# Patient Record
Sex: FEMALE | Race: WHITE | Employment: FULL TIME | URBAN - METROPOLITAN AREA
[De-identification: names, ages, dates, MRNs, and addresses within clinical notes are randomized per-mention and may not be internally consistent; named-entity substitution may affect disease eponyms.]

---

## 2017-01-31 ENCOUNTER — TELEPHONE (OUTPATIENT)
Dept: INTERNAL MEDICINE | Facility: CLINIC | Age: 62
End: 2017-01-31

## 2017-01-31 NOTE — TELEPHONE ENCOUNTER
"FYI only:  Patient calling to report a severe case of vertigo [facility] about [facility].m[facility]  States she was tilted back having some grinding done on her teeth when the room started spinning and it looked like the windows were swimming/moving. Staff sat her up, gave her some juice and candy as she hadn't eaten anything.  Symptoms lasted 1-2 minutes and were accompanied by a \"tension headache\" around her eyes, better now.  This afternoon she still feels a little off balance, slight HA.  Denies recent illness/URI, fever or experiencing any numbness, tingling, weakness, nausea, vomiting.  BP at [facility] office was 128/75.  Patient would like to see Dr. Hawthorne, no openings this afternoon, offered appt tomorrow with Dr. Hawthorne (spouse to drive her) with the understanding that she will be seen in ER if she develops new or worsening symptoms.  Patient is in agreement with this plan. DEO Arndt R.N.    "

## 2017-02-01 ENCOUNTER — OFFICE VISIT (OUTPATIENT)
Dept: INTERNAL MEDICINE | Facility: CLINIC | Age: 62
End: 2017-02-01
Payer: COMMERCIAL

## 2017-02-01 VITALS
HEART RATE: 86 BPM | WEIGHT: 160.6 LBS | DIASTOLIC BLOOD PRESSURE: 60 MMHG | HEIGHT: 69 IN | OXYGEN SATURATION: 98 % | BODY MASS INDEX: 23.79 KG/M2 | SYSTOLIC BLOOD PRESSURE: 100 MMHG | TEMPERATURE: 98.5 F

## 2017-02-01 DIAGNOSIS — R42 VERTIGO: Primary | ICD-10-CM

## 2017-02-01 PROCEDURE — 99213 OFFICE O/P EST LOW 20 MIN: CPT | Performed by: INTERNAL MEDICINE

## 2017-02-01 NOTE — PROGRESS NOTES
"  SUBJECTIVE:                                                      HPI: Melva Saleh is a pleasant 62 year old female who presents with vertigo:    - developed vertigo yesterday while getting dental work performed  - lasted 1-2 minutes   - felt a bit unstable, shaky, and uneasy afterwards    - this improved throughout the day    - was washing hair this morning when she redeveloped vertigo  - lasted <1 minute  - felt a bit uneasy afterwards - slowly improving    - in retrospect she has been having very mild episodes of vertigo all along when she is...   - turning in bed   - looking up into her cabinets   - being laid down in the dentist's chair    - no associated nausea or vomiting  - no associated headache or vision changes  - no hearing loss or tinnitus  - no focal numbness, tingling, or weakness  - no slurred speech or facial droop    - no recent URIs or active allergies  - no fevers or chills    The medication, allergy, and problem lists have been reviewed and updated as appropriate.     OBJECTIVE:                                                      /60 mmHg  Pulse 86  Temp(Src) 98.5  F (36.9  C) (Oral)  Ht 5' 9\" (1.753 m)  Wt 160 lb 9.6 oz (72.848 kg)  BMI 23.71 kg/m2  SpO2 98%  LMP  (LMP Unknown)  Constitutional: well-appearing  Respiratory: normal respiratory effort; clear to auscultation bilaterally  Cardiovascular: regular rate and rhythm; no edema  Musculoskeletal: normal gait, station, and transfers  Neuro: cranial nerves intact; speech fluent; moving all extremities normallys  Psych: normal judgment and insight; normal mood and affect; recent and remote memory intact    Angela-Hallpike maneuver: mildly symptomatic on right side, but no nystagmus; normal on left    ASSESSMENT/PLAN:                                                      (R42) Vertigo  (primary encounter diagnosis)  Comment: consistent with BPPV - discussed cause of this with patient via diagrams and pictures.   Plan:    - discussed " repositioning maneuver with patient - she will try with .   - referral to PT balance/vestibular deferred for now.    - if symptoms worsen, change, or do not improve, patient to contact MD.    The instructions on the AVS were discussed and explained to the patient. Patient expressed understanding of instructions.    Makayla Hawthorne MD   [address] [address]  T: [phone], F: [phone]

## 2017-02-01 NOTE — PATIENT INSTRUCTIONS
Suspect BPPV.    Recommend Epley maneuver with  - watch some youtube videos if it helps.    ---    If more comfortable with a therapist, we can always place a referral.

## 2017-02-01 NOTE — MR AVS SNAPSHOT
After Visit Summary   2/1/2017    Melva Saleh    MRN: [id number]           Patient Information     Date Of Birth          1955        Visit Information        Provider Department      2/1/2017 1:30 PM Makayla Hawthorne MD [facility]        Care Instructions    Suspect BPPV.    Recommend Epley maneuver with  - watch some youtube videos if it helps.    ---    If more comfortable with a therapist, we can always place a referral.         Follow-ups after your visit        Who to contact     If you have questions or need follow up information about today's clinic visit or your schedule please contact [facility] directly at [phone].  Normal or non-critical lab and imaging results will be communicated to you by MyChart, letter or phone within 4 business days after the clinic has received the results. If you do not hear from us within 7 days, please contact the clinic through Kalidohart or phone. If you have a critical or abnormal lab result, we will notify you by phone as soon as possible.  Submit refill requests through InvitedHome or call your pharmacy and they will forward the refill request to us. Please allow 3 business days for your refill to be completed.          Additional Information About Your Visit        MyChart Information     InvitedHome gives you secure access to your electronic health record. If you see a primary care provider, you can also send messages to your care team and make appointments. If you have questions, please call your primary care clinic.  If you do not have a primary care provider, please call [phone] and they will assist you.        Care EveryWhere ID     This is your Care EveryWhere ID. This could be used by other organizations to access your [place] medical records  [id number]        Your Vitals Were     Pulse Temperature Height BMI (Body Mass Index) Pulse Oximetry Last Period    86 98.5  F (36.9  C) (Oral)  "5' 9\" (1.753 m) 23.71 kg/m2 98% (LMP Unknown)       Blood Pressure from Last 3 Encounters:   02/01/17 100/60   10/27/16 110/66   04/08/16 120/70    Weight from Last 3 Encounters:   02/01/17 160 lb 9.6 oz (72.848 kg)   10/27/16 161 lb 14.4 oz (73.437 kg)   04/08/16 161 lb (73.029 kg)              Today, you had the following     No orders found for display       Primary Care Provider Office Phone # Fax #    Luis Angel Espinoza [phone]       [facility] [facility] [address]  [facility] [address]        Thank you!     Thank you for choosing [facility]  for your care. Our goal is always to provide you with excellent care. Hearing back from our patients is one way we can continue to improve our services. Please take a few minutes to complete the written survey that you may receive in the mail after your visit with us. Thank you!             Your Updated Medication List - Protect others around you: Learn how to safely use, store and throw away your medicines at www.disposemymeds.org.          This list is accurate as of: 2/1/17  2:22 PM.  Always use your most recent med list.                   Brand Name Dispense Instructions for use    pravastatin 20 MG tablet    PRAVACHOL    90 tablet    Take 1 tablet (20 mg) by mouth daily         "

## 2017-02-01 NOTE — NURSING NOTE
"Chief Complaint   Patient presents with     Dizziness     x 2 days.       Initial /60 mmHg  Pulse 86  Temp(Src) 98.5  F (36.9  C) (Oral)  Ht 5' 9\" (1.753 m)  Wt 160 lb 9.6 oz (72.848 kg)  BMI 23.71 kg/m2  SpO2 98%  LMP  (LMP Unknown) Estimated body mass index is 23.71 kg/(m^2) as calculated from the following:    Height as of this encounter: 5' 9\" (1.753 m).    Weight as of this encounter: 160 lb 9.6 oz (72.848 kg).  BP completed using cuff size: regular    Kaminibose MA      "

## 2017-04-04 ENCOUNTER — HOSPITAL ENCOUNTER (OUTPATIENT)
Dept: BONE DENSITY | Facility: CLINIC | Age: 62
End: 2017-04-04
Attending: INTERNAL MEDICINE
Payer: COMMERCIAL

## 2017-04-04 ENCOUNTER — HOSPITAL ENCOUNTER (OUTPATIENT)
Dept: MAMMOGRAPHY | Facility: CLINIC | Age: 62
Discharge: HOME | End: 2017-04-04
Attending: INTERNAL MEDICINE | Admitting: INTERNAL MEDICINE
Payer: COMMERCIAL

## 2017-04-04 DIAGNOSIS — Z12.31 VISIT FOR SCREENING MAMMOGRAM: ICD-10-CM

## 2017-04-04 DIAGNOSIS — Z78.0 ASYMPTOMATIC MENOPAUSE: ICD-10-CM

## 2017-04-04 PROCEDURE — G0202 SCR MAMMO BI INCL CAD: HCPCS

## 2017-04-04 PROCEDURE — 77080 DXA BONE DENSITY AXIAL: CPT

## 2017-09-28 ENCOUNTER — TELEPHONE (OUTPATIENT)
Dept: FAMILY MEDICINE | Facility: CLINIC | Age: 62
End: 2017-09-28

## 2017-09-28 NOTE — TELEPHONE ENCOUNTER
Reason for call:  Patient reporting a symptom    Symptom or request: body aches, cough, feeling unwell    Duration (how long have symptoms been present): 3 days    Have you been treated for this before? No    Additional comments:  called.  Declined to make an appt at this time.      Phone Number patient can be reached at:  Other phone number:  [phone]    Best Time:  any    Can we leave a detailed message on this number:  YES    Call taken on 9/28/2017 at 11:10 AM by MURALI CAMPO

## 2017-10-02 NOTE — TELEPHONE ENCOUNTER
"Pt is calling with questions about her symptoms. She says that her \"cold\" seems to be getting better. But she continues to have an achy feeling in her back. She says her back feels \"tender, tight, and almost spasm-like.\" But she is just getting over flu-like symptoms. Muscle aches and pains. Advised that she use a heating pad for her back when she sits. Light exercises or stretches. Drink lots of water. Can take IB, or tyl for discomfort. When she starts to feel better, if her back muscles still feel tight or tense, going for a massage may help. Or chiropractic work. Otherwise if symptoms worsen, schedule appt in clinic.  "

## 2017-10-06 ENCOUNTER — OFFICE VISIT (OUTPATIENT)
Dept: INTERNAL MEDICINE | Facility: CLINIC | Age: 62
End: 2017-10-06
Payer: COMMERCIAL

## 2017-10-06 ENCOUNTER — TELEPHONE (OUTPATIENT)
Dept: INTERNAL MEDICINE | Facility: CLINIC | Age: 62
End: 2017-10-06

## 2017-10-06 VITALS
BODY MASS INDEX: 24.72 KG/M2 | WEIGHT: 166.9 LBS | HEIGHT: 69 IN | OXYGEN SATURATION: 98 % | TEMPERATURE: 98.2 F | DIASTOLIC BLOOD PRESSURE: 80 MMHG | HEART RATE: 72 BPM | SYSTOLIC BLOOD PRESSURE: 112 MMHG

## 2017-10-06 DIAGNOSIS — L24.9 IRRITANT CONTACT DERMATITIS, UNSPECIFIED TRIGGER: Primary | ICD-10-CM

## 2017-10-06 PROCEDURE — 99213 OFFICE O/P EST LOW 20 MIN: CPT | Performed by: PHYSICIAN ASSISTANT

## 2017-10-06 RX ORDER — TRIAMCINOLONE ACETONIDE 1 MG/G
CREAM TOPICAL
Qty: 15 G | Refills: 1 | Status: SHIPPED | OUTPATIENT
Start: 2017-10-06 | End: 2018-10-08

## 2017-10-06 NOTE — PROGRESS NOTES
"  SUBJECTIVE:   Melva Saleh is a 62 year old female who presents to clinic today for the following health issues:      Rash  Onset: x1 wk     Description:   Location: on face   Character: round, burning, red  Itching (Pruritis): YES    Progression of Symptoms:  worsening    Accompanying Signs & Symptoms:  Fever: no   Body aches or joint pain: YES  Sore throat symptoms: no   Recent cold symptoms: YES    History:   Previous similar rash: no   Notes skin is getting drier with age.    Precipitating factors:   Exposure to similar rash: no   New exposures: None   Recent travel: no     Alleviating factors:  Na     Therapies Tried and outcome:vanocream cleanser, hydrocortisone cream -a little relief       -------------------------------------    Problem list and histories reviewed & adjusted, as indicated.  Additional history: as documented    Labs reviewed in EPIC    Reviewed and updated as needed this visit by clinical staffTobacco       Reviewed and updated as needed this visit by Provider  Allergies  Meds         ROS:  Constitutional, HEENT, cardiovascular, pulmonary, gi and gu systems are negative, except as otherwise noted.      OBJECTIVE:   /80 (BP Location: Left arm, Patient Position: Chair, Cuff Size: Adult Regular)  Pulse 72  Temp 98.2  F (36.8  C) (Oral)  Ht 5' 9\" (1.753 m)  Wt 166 lb 14.4 oz (75.7 kg)  LMP  (LMP Unknown)  SpO2 98%  BMI 24.65 kg/m2  Body mass index is 24.65 kg/(m^2).  GENERAL: healthy, alert and no distress  NECK: no adenopathy, no asymmetry, masses, or scars and thyroid normal to palpation  RESP: lungs clear to auscultation - no rales, rhonchi or wheezes  CV: regular rates and rhythm and normal S1 S2, no S3 or S4  SKIN: red macular rash on face, chin cheeks around eyes and eyebrows. Dry flakey.  No crusting or lesions noted .     Diagnostic Test Results:  none     ASSESSMENT/PLAN:             1. Irritant contact dermatitis, unspecified trigger  Appears irritant   Reviewed skin " cares   Use skin moisturizers such as Cetaphil, Eucerin and Lubriderm or bath additives such as Aveeno or AlphaKeri. Avoid excessive soap and water which may dry the skin. Reviewed sensitive skin cares, avoiding perfumed skin products, detergents, and soaps.     - triamcinolone (KENALOG) 0.1 % cream; Apply sparingly to affected area three times daily for 14 days.  Dispense: 15 g; Refill: 1        Ana Palua Love PA-C  [facility]

## 2017-10-06 NOTE — MR AVS SNAPSHOT
"              After Visit Summary   10/6/2017    Melva Saleh    MRN: [id number]           Patient Information     Date Of Birth          1955        Visit Information        Provider Department      10/6/2017 2:20 PM Ana Paula Love PA-C [facility]        Today's Diagnoses     Irritant contact dermatitis, unspecified trigger    -  1       Follow-ups after your visit        Who to contact     If you have questions or need follow up information about today's clinic visit or your schedule please contact [facility] directly at [phone].  Normal or non-critical lab and imaging results will be communicated to you by Celframehart, letter or phone within 4 business days after the clinic has received the results. If you do not hear from us within 7 days, please contact the clinic through Celframehart or phone. If you have a critical or abnormal lab result, we will notify you by phone as soon as possible.  Submit refill requests through [employer] or call your pharmacy and they will forward the refill request to us. Please allow 3 business days for your refill to be completed.          Additional Information About Your Visit        MyChart Information     [employer] gives you secure access to your electronic health record. If you see a primary care provider, you can also send messages to your care team and make appointments. If you have questions, please call your primary care clinic.  If you do not have a primary care provider, please call [phone] and they will assist you.        Care EveryWhere ID     This is your Care EveryWhere ID. This could be used by other organizations to access your [place] medical records  [id number]        Your Vitals Were     Pulse Temperature Height Last Period Pulse Oximetry BMI (Body Mass Index)    72 98.2  F (36.8  C) (Oral) 5' 9\" (1.753 m) (LMP Unknown) 98% 24.65 kg/m2       Blood Pressure from Last 3 Encounters:   10/06/17 112/80 "   02/01/17 100/60   10/27/16 110/66    Weight from Last 3 Encounters:   10/06/17 166 lb 14.4 oz (75.7 kg)   02/01/17 160 lb 9.6 oz (72.8 kg)   10/27/16 161 lb 14.4 oz (73.4 kg)              Today, you had the following     No orders found for display         Today's Medication Changes          These changes are accurate as of: 10/6/17  2:53 PM.  If you have any questions, ask your nurse or doctor.               Start taking these medicines.        Dose/Directions    triamcinolone 0.1 % cream   Commonly known as:  KENALOG   Used for:  Irritant contact dermatitis, unspecified trigger   Started by:  Ana Paula Love PA-C        Apply sparingly to affected area three times daily for 14 days.   Quantity:  15 g   Refills:  1            Where to get your medicines      These medications were sent to [facility] [address] [place] [facility] [address]  [address] [facility] [address]     Phone:  [phone]     triamcinolone 0.1 % cream                Primary Care Provider Office Phone # Fax #    Makayla Hawthorne [phone]       [address]  [facility] [address]        Equal Access to Services     ALVIN MERAZ AH: Hadii aad ku hadasho Soomaali, waaxda luqadaha, qaybta kaalmada adeegyada, waxay idiin hayannalisen alfreda benites. So [facility] [phone].    ATENCIÓN: Si habla español, tiene a roach disposición servicios gratuitos de asistencia lingüística. Llame al [phone].    We comply with applicable federal civil rights laws and [place] laws. We do not discriminate on the basis of race, color, national origin, age, disability, sex, sexual orientation, or gender identity.            Thank you!     Thank you for choosing [facility]  for your care. Our goal is always to provide you with excellent care. Hearing back from our patients is one way we can continue to improve our services. Please take a few minutes to complete the written survey that you may receive in  the mail after your visit with us. Thank you!             Your Updated Medication List - Protect others around you: Learn how to safely use, store and throw away your medicines at www.disposemymeds.org.          This list is accurate as of: 10/6/17  2:53 PM.  Always use your most recent med list.                   Brand Name Dispense Instructions for use Diagnosis    pravastatin 20 MG tablet    PRAVACHOL    90 tablet    Take 1 tablet (20 mg) by mouth daily    Hyperlipidemia with target LDL less than 130       triamcinolone 0.1 % cream    KENALOG    15 g    Apply sparingly to affected area three times daily for 14 days.    Irritant contact dermatitis, unspecified trigger

## 2017-10-06 NOTE — NURSING NOTE
"Chief Complaint   Patient presents with     Derm Problem     on face-x1 wk -R hand       Initial /80 (BP Location: Left arm, Patient Position: Chair, Cuff Size: Adult Regular)  Pulse 72  Temp 98.2  F (36.8  C) (Oral)  Ht 5' 9\" (1.753 m)  Wt 166 lb 14.4 oz (75.7 kg)  LMP  (LMP Unknown)  SpO2 98%  BMI 24.65 kg/m2 Estimated body mass index is 24.65 kg/(m^2) as calculated from the following:    Height as of this encounter: 5' 9\" (1.753 m).    Weight as of this encounter: 166 lb 14.4 oz (75.7 kg).  Medication Reconciliation: complete    "

## 2017-10-06 NOTE — TELEPHONE ENCOUNTER
Reason for call:  Patient reporting a symptom    Symptom or request: Dry skin patches    Duration (how long have symptoms been present):     Have you been treated for this before? No    Additional comments: Pt would like to talk to the nurse to discuss random dry skin patches that are not normal for her.     Phone Number patient can be reached at:  Cell number on file:    Telephone Information:   Mobile [phone]       Best Time:  asap    Can we leave a detailed message on this number:  YES    Call taken on 10/6/2017 at 11:08 AM by Susanne Mason

## 2017-10-06 NOTE — TELEPHONE ENCOUNTER
"Melva Saleh is a 62 year old female who calls with skin issues.    NURSING ASSESSMENT:  Description:  Dry, itchy face. Blotchy. Dry patches below eyes. Eyelids are dry. Patches are a little pink. On side of nose and chin as well. Has been dry, burning, and itchy.  Onset/duration:  Since the weekend  Precip. factors:  Is getting over a \"real bad cold\" that lasted about 10 days  Associated symptoms:  See above. No trouble breathing although sometimes needs inhaler with colds. Denies fever, headache, purple spots. Denies swelling.  Last exam/Treatment:  2/1/17  Allergies:   Allergies   Allergen Reactions     Augmentin Difficulty breathing     Erythromycin Nausea     Lactose      Penicillins Other (See Comments) and Hives     angioedema       MEDICATIONS:   Has tried cetaphil and cream. Put hydrocortisone on face. Used moisturizer. Did advise usually do not use hydrocortisone on face.    NURSING PLAN: Nursing advice to patient see for appointment    RECOMMENDED DISPOSITION:  See in 2-4 hours - appointment made  Will comply with recommendation: Yes  If further questions/concerns or if symptoms do not improve, worsen or new symptoms develop, call your PCP or [facility] as soon as possible.      Guideline used:  Telephone Triage Protocols for Nurses, Fifth Edition, [place]ireles RN    "

## 2017-10-18 ENCOUNTER — OFFICE VISIT (OUTPATIENT)
Dept: INTERNAL MEDICINE | Facility: CLINIC | Age: 62
End: 2017-10-18
Payer: COMMERCIAL

## 2017-10-18 VITALS
TEMPERATURE: 98.3 F | DIASTOLIC BLOOD PRESSURE: 80 MMHG | HEIGHT: 69 IN | SYSTOLIC BLOOD PRESSURE: 112 MMHG | BODY MASS INDEX: 24.65 KG/M2 | OXYGEN SATURATION: 98 % | WEIGHT: 166.4 LBS | HEART RATE: 65 BPM

## 2017-10-18 DIAGNOSIS — J01.90 ACUTE SINUSITIS WITH SYMPTOMS > 10 DAYS: Primary | ICD-10-CM

## 2017-10-18 PROCEDURE — 99213 OFFICE O/P EST LOW 20 MIN: CPT | Performed by: PHYSICIAN ASSISTANT

## 2017-10-18 RX ORDER — DOXYCYCLINE 100 MG/1
100 CAPSULE ORAL 2 TIMES DAILY
Qty: 20 CAPSULE | Refills: 0 | Status: SHIPPED | OUTPATIENT
Start: 2017-10-18 | End: 2017-11-16

## 2017-10-18 NOTE — NURSING NOTE
"Chief Complaint   Patient presents with     Sinus Problem     x2 wk      Fatigue     x2 days        Initial /80 (BP Location: Left arm, Patient Position: Chair, Cuff Size: Adult Regular)  Pulse 65  Temp 98.3  F (36.8  C) (Oral)  Ht 5' 9\" (1.753 m)  Wt 166 lb 6.4 oz (75.5 kg)  LMP  (LMP Unknown)  SpO2 98%  BMI 24.57 kg/m2 Estimated body mass index is 24.57 kg/(m^2) as calculated from the following:    Height as of this encounter: 5' 9\" (1.753 m).    Weight as of this encounter: 166 lb 6.4 oz (75.5 kg).  Medication Reconciliation: complete    "

## 2017-10-18 NOTE — MR AVS SNAPSHOT
"              After Visit Summary   10/18/2017    Melva Saleh    MRN: [id number]           Patient Information     Date Of Birth          1955        Visit Information        Provider Department      10/18/2017 10:00 AM Ana Paula Love PA-C [facility]        Today's Diagnoses     Acute sinusitis with symptoms > 10 days    -  1       Follow-ups after your visit        Who to contact     If you have questions or need follow up information about today's clinic visit or your schedule please contact [facility] directly at [phone].  Normal or non-critical lab and imaging results will be communicated to you by MyChart, letter or phone within 4 business days after the clinic has received the results. If you do not hear from us within 7 days, please contact the clinic through Dipityhart or phone. If you have a critical or abnormal lab result, we will notify you by phone as soon as possible.  Submit refill requests through [employer] or call your pharmacy and they will forward the refill request to us. Please allow 3 business days for your refill to be completed.          Additional Information About Your Visit        MyChart Information     [employer] Mobile gives you secure access to your electronic health record. If you see a primary care provider, you can also send messages to your care team and make appointments. If you have questions, please call your primary care clinic.  If you do not have a primary care provider, please call [phone] and they will assist you.        Care EveryWhere ID     This is your Care EveryWhere ID. This could be used by other organizations to access your [place] medical records  [id number]        Your Vitals Were     Pulse Temperature Height Last Period Pulse Oximetry BMI (Body Mass Index)    65 98.3  F (36.8  C) (Oral) 5' 9\" (1.753 m) (LMP Unknown) 98% 24.57 kg/m2       Blood Pressure from Last 3 Encounters:   10/18/17 112/80 "   10/06/17 112/80   02/01/17 100/60    Weight from Last 3 Encounters:   10/18/17 166 lb 6.4 oz (75.5 kg)   10/06/17 166 lb 14.4 oz (75.7 kg)   02/01/17 160 lb 9.6 oz (72.8 kg)              Today, you had the following     No orders found for display         Today's Medication Changes          These changes are accurate as of: 10/18/17 10:28 AM.  If you have any questions, ask your nurse or doctor.               Start taking these medicines.        Dose/Directions    doxycycline 100 MG capsule   Commonly known as:  VIBRAMYCIN   Used for:  Acute sinusitis with symptoms > 10 days   Started by:  Ana Paula Love PA-C        Dose:  100 mg   Take 1 capsule (100 mg) by mouth 2 times daily   Quantity:  20 capsule   Refills:  0            Where to get your medicines      These medications were sent to [facility] [address] [place] [facility] [address]  [address] [facility] [address]     Phone:  [phone]     doxycycline 100 MG capsule                Primary Care Provider Office Phone # Fax #    Makayla Hawthorne [phone]       [address]  [facility] [address]        Equal Access to Services     ALVIN MERAZ AH: Hadii david tariq hadasho Soomaali, waaxda luqadaha, qaybta kaalmada adeegyada, keysha small hayannalisen alfreda benites. So [facility] [phone].    ATENCIÓN: Si habla español, tiene a roach disposición servicios gratuitos de asistencia lingüística. Llame al [phone].    We comply with applicable federal civil rights laws and [place] laws. We do not discriminate on the basis of race, color, national origin, age, disability, sex, sexual orientation, or gender identity.            Thank you!     Thank you for choosing [facility]  for your care. Our goal is always to provide you with excellent care. Hearing back from our patients is one way we can continue to improve our services. Please take a few minutes to complete the written survey that you may receive  in the mail after your visit with us. Thank you!             Your Updated Medication List - Protect others around you: Learn how to safely use, store and throw away your medicines at www.disposemymeds.org.          This list is accurate as of: 10/18/17 10:28 AM.  Always use your most recent med list.                   Brand Name Dispense Instructions for use Diagnosis    doxycycline 100 MG capsule    VIBRAMYCIN    20 capsule    Take 1 capsule (100 mg) by mouth 2 times daily    Acute sinusitis with symptoms > 10 days       pravastatin 20 MG tablet    PRAVACHOL    90 tablet    Take 1 tablet (20 mg) by mouth daily    Hyperlipidemia with target LDL less than 130       triamcinolone 0.1 % cream    KENALOG    15 g    Apply sparingly to affected area three times daily for 14 days.    Irritant contact dermatitis, unspecified trigger

## 2017-11-16 ENCOUNTER — OFFICE VISIT (OUTPATIENT)
Dept: INTERNAL MEDICINE | Facility: CLINIC | Age: 62
End: 2017-11-16
Payer: COMMERCIAL

## 2017-11-16 VITALS
TEMPERATURE: 97.8 F | DIASTOLIC BLOOD PRESSURE: 74 MMHG | SYSTOLIC BLOOD PRESSURE: 122 MMHG | WEIGHT: 165.5 LBS | HEIGHT: 69 IN | HEART RATE: 76 BPM | BODY MASS INDEX: 24.51 KG/M2

## 2017-11-16 DIAGNOSIS — L30.1 DYSHIDROTIC HAND DERMATITIS: ICD-10-CM

## 2017-11-16 DIAGNOSIS — Z00.00 ENCOUNTER FOR ROUTINE ADULT HEALTH EXAMINATION WITHOUT ABNORMAL FINDINGS: ICD-10-CM

## 2017-11-16 DIAGNOSIS — Z23 NEED FOR PROPHYLACTIC VACCINATION AND INOCULATION AGAINST INFLUENZA: Primary | ICD-10-CM

## 2017-11-16 LAB — GLUCOSE SERPL-MCNC: 95 MG/DL (ref 70–99)

## 2017-11-16 PROCEDURE — 36415 COLL VENOUS BLD VENIPUNCTURE: CPT | Performed by: PHYSICIAN ASSISTANT

## 2017-11-16 PROCEDURE — 80061 LIPID PANEL: CPT | Performed by: PHYSICIAN ASSISTANT

## 2017-11-16 PROCEDURE — 90471 IMMUNIZATION ADMIN: CPT | Performed by: PHYSICIAN ASSISTANT

## 2017-11-16 PROCEDURE — 90686 IIV4 VACC NO PRSV 0.5 ML IM: CPT | Performed by: PHYSICIAN ASSISTANT

## 2017-11-16 PROCEDURE — 99213 OFFICE O/P EST LOW 20 MIN: CPT | Mod: 25 | Performed by: PHYSICIAN ASSISTANT

## 2017-11-16 PROCEDURE — 82947 ASSAY GLUCOSE BLOOD QUANT: CPT | Performed by: PHYSICIAN ASSISTANT

## 2017-11-16 RX ORDER — CLOBETASOL PROPIONATE 0.5 MG/G
OINTMENT TOPICAL
Qty: 45 G | Refills: 1 | Status: SHIPPED | OUTPATIENT
Start: 2017-11-16 | End: 2019-07-22

## 2017-11-16 NOTE — NURSING NOTE
"Chief Complaint   Patient presents with     Physical     fasting       Initial /74  Pulse 76  Temp 97.8  F (36.6  C) (Oral)  Ht 5' 9\" (1.753 m)  Wt 165 lb 8 oz (75.1 kg)  LMP  (LMP Unknown)  BMI 24.44 kg/m2 Estimated body mass index is 24.44 kg/(m^2) as calculated from the following:    Height as of this encounter: 5' 9\" (1.753 m).    Weight as of this encounter: 165 lb 8 oz (75.1 kg).  Medication Reconciliation: complete   Reva Tran MA   "

## 2017-11-16 NOTE — MR AVS SNAPSHOT
After Visit Summary   11/16/2017    Melva Saleh    MRN: [id number]           Patient Information     Date Of Birth          1955        Visit Information        Provider Department      11/16/2017 8:40 AM Ryanne Joseph PA-C [facility]        Today's Diagnoses     Need for prophylactic vaccination and inoculation against influenza    -  1    Encounter for routine adult health examination without abnormal findings        Dyshidrotic hand dermatitis           Follow-ups after your visit        Who to contact     If you have questions or need follow up information about today's clinic visit or your schedule please contact [facility] directly at [phone].  Normal or non-critical lab and imaging results will be communicated to you by MyChart, letter or phone within 4 business days after the clinic has received the results. If you do not hear from us within 7 days, please contact the clinic through LiveSafehart or phone. If you have a critical or abnormal lab result, we will notify you by phone as soon as possible.  Submit refill requests through Nuvosun or call your pharmacy and they will forward the refill request to us. Please allow 3 business days for your refill to be completed.          Additional Information About Your Visit        MyChart Information     Nuvosun gives you secure access to your electronic health record. If you see a primary care provider, you can also send messages to your care team and make appointments. If you have questions, please call your primary care clinic.  If you do not have a primary care provider, please call [phone] and they will assist you.        Care EveryWhere ID     This is your Care EveryWhere ID. This could be used by other organizations to access your [facility] medical records  [id number]        Your Vitals Were     Pulse Temperature Height Last Period BMI (Body Mass Index)       76 97.8  F (36.6  C)  "(Oral) 5' 9\" (1.753 m) (LMP Unknown) 24.44 kg/m2        Blood Pressure from Last 3 Encounters:   11/16/17 122/74   10/18/17 112/80   10/06/17 112/80    Weight from Last 3 Encounters:   11/16/17 165 lb 8 oz (75.1 kg)   10/18/17 166 lb 6.4 oz (75.5 kg)   10/06/17 166 lb 14.4 oz (75.7 kg)              We Performed the Following     FLU VAC, SPLIT VIRUS IM > 3 YO (QUADRIVALENT) [70373]     Glucose     Lipid panel reflex to direct LDL Fasting     Vaccine Administration, Initial [16106]          Today's Medication Changes          These changes are accurate as of: 11/16/17  1:00 PM.  If you have any questions, ask your nurse or doctor.               Start taking these medicines.        Dose/Directions    clobetasol 0.05 % ointment   Commonly known as:  TEMOVATE   Used for:  Dyshidrotic hand dermatitis   Started by:  Ryanne Joseph PA-C        Apply sparingly to affected area twice daily prn. After two weeks take 1 wk break, then repeat prn.  Do not apply to face.   Quantity:  45 g   Refills:  1            Where to get your medicines      These medications were sent to [facility] [id number] [place] [address] [address]     Phone:  [phone]     clobetasol 0.05 % ointment                Primary Care Provider Office Phone # Fax #    Makayla Hawthorne [phone]       [address] [facility] [address]        Equal Access to Services     [facility] HospitalSK[facility] AH: Hadii aad ku hadasho Soomaali, waaxda luqadaha, qaybta kaalmada adeyue, keysha benites. So [facility] [phone].    ATENCIÓN: Si habla español, tiene a roach disposición servicios gratuitos de asistencia lingüística. Llame al [phone].    We comply with applicable federal civil rights laws and [place] laws. We do not discriminate on the basis of race, color, national origin, age, disability, sex, sexual orientation, or gender identity.            Thank you!     Thank you for " choosing [facility]  for your care. Our goal is always to provide you with excellent care. Hearing back from our patients is one way we can continue to improve our services. Please take a few minutes to complete the written survey that you may receive in the mail after your visit with us. Thank you!             Your Updated Medication List - Protect others around you: Learn how to safely use, store and throw away your medicines at www.disposemymeds.org.          This list is accurate as of: 11/16/17  1:00 PM.  Always use your most recent med list.                   Brand Name Dispense Instructions for use Diagnosis    clobetasol 0.05 % ointment    TEMOVATE    45 g    Apply sparingly to affected area twice daily prn. After two weeks take 1 wk break, then repeat prn.  Do not apply to face.    Dyshidrotic hand dermatitis       pravastatin 20 MG tablet    PRAVACHOL    90 tablet    Take 1 tablet (20 mg) by mouth daily    Hyperlipidemia with target LDL less than 130       triamcinolone 0.1 % cream    KENALOG    15 g    Apply sparingly to affected area three times daily for 14 days.    Irritant contact dermatitis, unspecified trigger

## 2017-11-16 NOTE — PROGRESS NOTES
"   SUBJECTIVE:   HPI  CC: Melva Saleh is an 62 year old woman who presents for a form to be completed for her 's work. She does not wish to have her annual preventative exam today.   She needs her fasting cholesterol and glucose checked for form completion.   She has a history of high cholesterol on pravastatin. She notes she has not been taking it much in the past month due to  Illness.    Pt also has concerns for a rash on her hands. Location is bilateral hands on the palmar surface.   She notes she had a rash on her face recently treated with triamcinolone cream which relieved it. She notes she has tried this on her hand rash with no minimal relief. It is itchy. She has no history of eczema. She has had no recent new exposures.     Reviewed and updated as needed this visit by clinical staff  Tobacco  Allergies  Meds  Problems         Reviewed and updated as needed this visit by Provider  Meds  Problems            Review of Systems:  As above     OBJECTIVE:   /74  Pulse 76  Temp 97.8  F (36.6  C) (Oral)  Ht 5' 9\" (1.753 m)  Wt 165 lb 8 oz (75.1 kg)  LMP  (LMP Unknown)  BMI 24.44 kg/m2  Physical Exam  GENERAL: healthy, alert and no distress  RESP: lungs clear to auscultation - no rales, rhonchi or wheezes  CV: regular rates and rhythm, normal S1 S2, no S3 or S4 and no murmur, click or rub  SKIN:   On palmar surfaces there is small yellow vesicles  Surrounding skin is calloused with salmon coloring and cracking of the skin.     ASSESSMENT/PLAN:   1. Need for prophylactic vaccination and inoculation against influenza  - FLU VAC, SPLIT VIRUS IM > 3 YO (QUADRIVALENT) [51123]  - Vaccine Administration, Initial [14444]    2. Encounter for routine adult health examination without abnormal findings  -Pt only wanted form completed and does not want her annual wellness exam today.   - Lipid panel reflex to direct LDL Fasting  - Glucose  -will notify pt when lab work is completed so that she can pick " up form    3. Dyshidrotic hand dermatitis  - clobetasol (TEMOVATE) 0.05 % ointment; Apply sparingly to affected area twice daily prn. After two weeks take 1 wk break, then repeat prn.  Do not apply to face.  Dispense: 45 g; Refill: 1  -reviewed side effects.  -follow up if no improvement    Pt agreed to the above plan and all questions were answered.     Ryanne Joseph PA-C  [facility]      Injectable Influenza Immunization Documentation    1.  Is the person to be vaccinated sick today?   No    2. Does the person to be vaccinated have an allergy to a component   of the vaccine?   No  Egg Allergy Algorithm Link    3. Has the person to be vaccinated ever had a serious reaction   to influenza vaccine in the past?   No    4. Has the person to be vaccinated ever had Guillain-Barré syndrome?   No    Form completed by Reva Tran MA

## 2017-11-17 ENCOUNTER — TELEPHONE (OUTPATIENT)
Dept: INTERNAL MEDICINE | Facility: CLINIC | Age: 62
End: 2017-11-17

## 2017-11-17 LAB
CHOLEST SERPL-MCNC: 198 MG/DL
HDLC SERPL-MCNC: 56 MG/DL
LDLC SERPL CALC-MCNC: 128 MG/DL
NONHDLC SERPL-MCNC: 142 MG/DL
TRIGL SERPL-MCNC: 68 MG/DL

## 2018-01-01 NOTE — PROGRESS NOTES
"  SUBJECTIVE:   Melva Saleh is a 62 year old female who presents to clinic today for the following health issues:      Acute Illness   Acute illness concerns: Sinus Pressure  Onset: x2 wks     Fever: no     Chills/Sweats: no     Headache (location?): YES    Sinus Pressure:YES    Conjunctivitis:  no    Ear Pain: no    Rhinorrhea: no     Congestion: YES- felt better for a few days and then over the past several days symptoms worsening.     Sore Throat: no      Cough: YES - wet-green flem     Wheeze: no     Decreased Appetite: no     Nausea: no     Vomiting: no     Diarrhea:  no     Dysuria/Freq.: no     Fatigue/Achiness: YES- both     Sick/Strep Exposure: no      Therapies Tried and outcome: ibuprofen, mucinex and flonase- with good relief - but fatigue, and green sputum worsening again.   Sinus pressure pain.           -------------------------------------    Problem list and histories reviewed & adjusted, as indicated.  Additional history: as documented    Labs reviewed in EPIC    Reviewed and updated as needed this visit by clinical staffTobacco  Allergies       Reviewed and updated as needed this visit by Provider  Allergies  Meds         ROS:  Constitutional, HEENT, cardiovascular, pulmonary, gisystems are negative, except as otherwise noted.      OBJECTIVE:   /80 (BP Location: Left arm, Patient Position: Chair, Cuff Size: Adult Regular)  Pulse 65  Temp 98.3  F (36.8  C) (Oral)  Ht 5' 9\" (1.753 m)  Wt 166 lb 6.4 oz (75.5 kg)  LMP  (LMP Unknown)  SpO2 98%  BMI 24.57 kg/m2  Body mass index is 24.57 kg/(m^2).  GENERAL: healthy, alert and no distress  HENT: normal cephalic/atraumatic, ear canals and TM's normal, nasal mucosa edematous , rhinorrhea green and sinuses: maxillary tenderness on bilateral  NECK: no adenopathy, no asymmetry, masses, or scars and thyroid normal to palpation  RESP: lungs clear to auscultation - no rales, rhonchi or wheezes  CV: regular rates and rhythm and normal S1 S2, no S3 " or S4  MS: no gross musculoskeletal defects noted, no edema    Diagnostic Test Results:  none     ASSESSMENT/PLAN:             1. Acute sinusitis with symptoms > 10 days    - doxycycline (VIBRAMYCIN) 100 MG capsule; Take 1 capsule (100 mg) by mouth 2 times daily  Dispense: 20 capsule; Refill: 0    Continue with flonase, mucinex and fluids  Recheck prn not improving      Ana Paula Love PA-C  [facility]     Statement Selected

## 2018-01-02 ENCOUNTER — OFFICE VISIT (OUTPATIENT)
Dept: URGENT CARE | Facility: URGENT CARE | Age: 63
End: 2018-01-02
Payer: COMMERCIAL

## 2018-01-02 VITALS
HEART RATE: 74 BPM | BODY MASS INDEX: 23.92 KG/M2 | TEMPERATURE: 97.7 F | OXYGEN SATURATION: 97 % | RESPIRATION RATE: 16 BRPM | DIASTOLIC BLOOD PRESSURE: 79 MMHG | WEIGHT: 162 LBS | SYSTOLIC BLOOD PRESSURE: 116 MMHG

## 2018-01-02 DIAGNOSIS — I88.9 LYMPHADENITIS: ICD-10-CM

## 2018-01-02 DIAGNOSIS — J03.90 INFECTIVE TONSILLITIS: Primary | ICD-10-CM

## 2018-01-02 PROCEDURE — 99213 OFFICE O/P EST LOW 20 MIN: CPT | Performed by: PHYSICIAN ASSISTANT

## 2018-01-02 RX ORDER — AZITHROMYCIN 250 MG/1
TABLET, FILM COATED ORAL
Qty: 6 TABLET | Refills: 0 | Status: SHIPPED | OUTPATIENT
Start: 2018-01-02 | End: 2018-05-18

## 2018-01-02 NOTE — PROGRESS NOTES
SUBJECTIVE:  Melva Saleh is a 62 year old female with a chief complaint of sore throat.  Onset of symptoms was 3 day(s) ago.    Course of illness: still present.  Severity mild and moderate  Current and Associated symptoms: sore throat  Treatment measures tried include otc medications.  Predisposing factors include recent illness.    Past Medical History:   Diagnosis Date     Hyperlipidemia      Allergies   Allergen Reactions     Augmentin Difficulty breathing     Erythromycin Nausea     Lactose      Penicillins Other (See Comments) and Hives     angioedema     Social History   Substance Use Topics     Smoking status: Never Smoker     Smokeless tobacco: Never Used     Alcohol use 0.0 oz/week     0 Standard drinks or equivalent per week      Comment: very rare       ROS:  CONSTITUTIONAL:NEGATIVE for fever, chills, change in weight  INTEGUMENTARY/SKIN: NEGATIVE for worrisome rashes, moles or lesions  ENT/MOUTH: POSITIVE for throat pain with swollen glands  RESP:NEGATIVE for significant cough or SOB  CV: NEGATIVE for chest pain, palpitations or peripheral edema  GI: NEGATIVE for nausea, abdominal pain, heartburn, or change in bowel habits  MUSCULOSKELETAL: NEGATIVE for significant arthralgias or myalgia  NEURO: NEGATIVE for weakness, dizziness or paresthesias    OBJECTIVE:   /79  Pulse 74  Temp 97.7  F (36.5  C) (Oral)  Resp 16  Wt 162 lb (73.5 kg)  LMP  (LMP Unknown)  SpO2 97%  BMI 23.92 kg/m2  GENERAL APPEARANCE: healthy, alert and no distress  HENT: ear canals and TM's normal.  Nose normal.  Pharynx erythematous with some exudate noted.  NECK: supple, non-tender to palpation, no adenopathy noted  RESP: lungs clear to auscultation - no rales, rhonchi or wheezes  CV: regular rates and rhythm, normal S1 S2, no murmur noted  SKIN: no suspicious lesions or rashes        ASSESSMENT:      Infective tonsillitis  Lymphadenitis    PLAN:   See orders in epic.   Symptomatic treat with gargles, lozenges, and OTC  analgesic as needed. Follow-up with primary clinic if not improving.  Orders Placed This Encounter     azithromycin (ZITHROMAX) 250 MG tablet     MAGIC MOUTHWASH, ENTER INGREDIENTS IN COMMENTS,       Advisement given that patient will be contagious for the next 24-48 hours after antibiotics initiated

## 2018-01-02 NOTE — MR AVS SNAPSHOT
After Visit Summary   1/2/2018    Melva Saleh    MRN: [id number]           Patient Information     Date Of Birth          1955        Visit Information        Provider Department      1/2/2018 9:10 AM Faustino Machuca PA-C [facility]        Today's Diagnoses     Infective tonsillitis    -  1    Lymphadenitis           Follow-ups after your visit        Who to contact     If you have questions or need follow up information about today's clinic visit or your schedule please contact [facility] directly at [phone].  Normal or non-critical lab and imaging results will be communicated to you by Sweet Credhart, letter or phone within 4 business days after the clinic has received the results. If you do not hear from us within 7 days, please contact the clinic through Sweet Credhart or phone. If you have a critical or abnormal lab result, we will notify you by phone as soon as possible.  Submit refill requests through [email or web address] or call your pharmacy and they will forward the refill request to us. Please allow 3 business days for your refill to be completed.          Additional Information About Your Visit        MyChart Information     [email or web address] gives you secure access to your electronic health record. If you see a primary care provider, you can also send messages to your care team and make appointments. If you have questions, please call your primary care clinic.  If you do not have a primary care provider, please call [phone] and they will assist you.        Care EveryWhere ID     This is your Care EveryWhere ID. This could be used by other organizations to access your [facility] medical records  [id number]        Your Vitals Were     Pulse Temperature Respirations Last Period Pulse Oximetry BMI (Body Mass Index)    74 97.7  F (36.5  C) (Oral) 16 (LMP Unknown) 97% 23.92 kg/m2       Blood Pressure from Last 3 Encounters:   01/02/18 116/79   11/16/17 122/74    10/18/17 112/80    Weight from Last 3 Encounters:   01/02/18 162 lb (73.5 kg)   11/16/17 165 lb 8 oz (75.1 kg)   10/18/17 166 lb 6.4 oz (75.5 kg)              Today, you had the following     No orders found for display         Today's Medication Changes          These changes are accurate as of: 1/2/18 10:37 AM.  If you have any questions, ask your nurse or doctor.               Start taking these medicines.        Dose/Directions    azithromycin 250 MG tablet   Commonly known as:  ZITHROMAX   Used for:  Infective tonsillitis, Lymphadenitis   Started by:  Faustino Machuca PA-C        2 tabs po qd day 1, then 1 tab po qd days 2-5   Quantity:  6 tablet   Refills:  0       MAGIC MOUTHWASH (ENTER INGREDIENTS IN COMMENTS)   Used for:  Infective tonsillitis   Started by:  Faustino Machuca PA-C        Dose:  5-10 mL   Swish and spit 5-10 mLs in mouth every 6 hours as needed Pharmacy please compound  30 ml of Benadryl (12.5 mg/5 ml), 60 ml Maalox and 30 ml Viscous Lidocaine   Quantity:  120 mL   Refills:  0            Where to get your medicines      These medications were sent to [place] [address] [place] [id number]     Phone:  [phone]     azithromycin 250 MG tablet         Some of these will need a paper prescription and others can be bought over the counter.  Ask your nurse if you have questions.     Bring a paper prescription for each of these medications     MAGIC MOUTHWASH (ENTER INGREDIENTS IN COMMENTS)                Primary Care Provider Office Phone # Fax #    Makayla Hawthorne [phone]       [address] [facility] [address]        Equal Access to Services     MAGALI MERAZ AH: Arnie Samano, fadumo castro, roya chua, keysha benites. So [facility] [phone].    ATENCIÓN: Si habla español, tiene a roach disposición servicios gratuitos de asistencia lingüística. Llame al  [phone].    We comply with applicable federal civil rights laws and [place] laws. We do not discriminate on the basis of race, color, national origin, age, disability, sex, sexual orientation, or gender identity.            Thank you!     Thank you for choosing [facility]  for your care. Our goal is always to provide you with excellent care. Hearing back from our patients is one way we can continue to improve our services. Please take a few minutes to complete the written survey that you may receive in the mail after your visit with us. Thank you!             Your Updated Medication List - Protect others around you: Learn how to safely use, store and throw away your medicines at www.disposemymeds.org.          This list is accurate as of: 1/2/18 10:37 AM.  Always use your most recent med list.                   Brand Name Dispense Instructions for use Diagnosis    azithromycin 250 MG tablet    ZITHROMAX    6 tablet    2 tabs po qd day 1, then 1 tab po qd days 2-5    Infective tonsillitis, Lymphadenitis       clobetasol 0.05 % ointment    TEMOVATE    45 g    Apply sparingly to affected area twice daily prn. After two weeks take 1 wk break, then repeat prn.  Do not apply to face.    Dyshidrotic hand dermatitis       MAGIC MOUTHWASH (ENTER INGREDIENTS IN COMMENTS)     120 mL    Swish and spit 5-10 mLs in mouth every 6 hours as needed Pharmacy please compound  30 ml of Benadryl (12.5 mg/5 ml), 60 ml Maalox and 30 ml Viscous Lidocaine    Infective tonsillitis       pravastatin 20 MG tablet    PRAVACHOL    90 tablet    Take 1 tablet (20 mg) by mouth daily    Hyperlipidemia with target LDL less than 130       triamcinolone 0.1 % cream    KENALOG    15 g    Apply sparingly to affected area three times daily for 14 days.    Irritant contact dermatitis, unspecified trigger

## 2018-01-02 NOTE — NURSING NOTE
"Chief Complaint   Patient presents with     Urgent Care     Pharyngitis     Pt sates dry throat,sore throat, couple lession on left side 3x days        Initial /79  Pulse 74  Temp 97.7  F (36.5  C) (Oral)  Resp 16  Wt 162 lb (73.5 kg)  LMP  (LMP Unknown)  SpO2 97%  BMI 23.92 kg/m2 Estimated body mass index is 23.92 kg/(m^2) as calculated from the following:    Height as of 11/16/17: 5' 9\" (1.753 m).    Weight as of this encounter: 162 lb (73.5 kg).  Medication Reconciliation: complete      "

## 2018-04-25 ENCOUNTER — OFFICE VISIT (OUTPATIENT)
Dept: DERMATOLOGY | Facility: CLINIC | Age: 63
End: 2018-04-25
Payer: COMMERCIAL

## 2018-04-25 VITALS — SYSTOLIC BLOOD PRESSURE: 115 MMHG | DIASTOLIC BLOOD PRESSURE: 79 MMHG | OXYGEN SATURATION: 94 % | HEART RATE: 73 BPM

## 2018-04-25 DIAGNOSIS — L30.1 DYSHIDROTIC ECZEMA: ICD-10-CM

## 2018-04-25 DIAGNOSIS — L29.9 LOCALIZED PRURITUS: ICD-10-CM

## 2018-04-25 DIAGNOSIS — L85.3 XEROSIS OF SKIN: Primary | ICD-10-CM

## 2018-04-25 PROCEDURE — 99203 OFFICE O/P NEW LOW 30 MIN: CPT | Performed by: PHYSICIAN ASSISTANT

## 2018-04-25 NOTE — PROGRESS NOTES
HPI:  Melva Saleh is a 63 year old year old female patient here today for rash on hands   Duration: 8 months on and off  Symptoms:  Itch, burning, blisters that don't pop, and thickened skin     Previous treatments: Kenalog 1% cream BID.  Clobetasol 1-2x a day for 10 days with great improvement     Alleviating/aggravating factors: no additional    Associated symptoms: none  Additional findings:none  Patient has no other skin complaints today.  Remainder of the HPI, Meds, PMH, Allergies, FH, and SH was reviewed in chart.      Past Medical History:   Diagnosis Date     Hyperlipidemia        Past Surgical History:   Procedure Laterality Date     TONSILLECTOMY & ADENOIDECTOMY  as a child        Family History   Problem Relation Age of Onset     Coronary Artery Disease Father      s/p PCI (stent)     Myocardial Infarction Paternal Grandfather 66     Breast Cancer Maternal Aunt      Breast Cancer Paternal Grandmother      DIABETES No family hx of      CEREBROVASCULAR DISEASE No family hx of      Ovarian Cancer No family hx of      Colon Cancer No family hx of        Social History     Social History     Marital status:      Spouse name: N/A     Number of children: N/A     Years of education: N/A     Occupational History     Ministry staff at [employer]      Social History Main Topics     Smoking status: Never Smoker     Smokeless tobacco: Never Used     Alcohol use 0.0 oz/week     0 Standard drinks or equivalent per week      Comment: very rare     Drug use: No     Sexual activity: Yes     Partners: Male     Birth control/ protection: Surgical, Post-menopausal      Comment:  vasectomy     Other Topics Concern     Parent/Sibling W/ Cabg, Mi Or Angioplasty Before 65f 55m? No     Bike Helmet Yes     Seat Belt Yes     Social History Narrative    .    3 adult children - live in area.    2 grandsons & one on the way (2016).    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    No formal  exercise currently.        Outpatient Encounter Prescriptions as of 4/25/2018   Medication Sig Dispense Refill     azithromycin (ZITHROMAX) 250 MG tablet 2 tabs po qd day 1, then 1 tab po qd days 2-5 6 tablet 0     clobetasol (TEMOVATE) 0.05 % ointment Apply sparingly to affected area twice daily prn. After two weeks take 1 wk break, then repeat prn.  Do not apply to face. 45 g 1     MAGIC MOUTHWASH, ENTER INGREDIENTS IN COMMENTS, Swish and spit 5-10 mLs in mouth every 6 hours as needed Pharmacy please compound  30 ml of Benadryl (12.5 mg/5 ml), 60 ml Maalox and 30 ml Viscous Lidocaine 120 mL 0     pravastatin (PRAVACHOL) 20 MG tablet Take 1 tablet (20 mg) by mouth daily 90 tablet 3     triamcinolone (KENALOG) 0.1 % cream Apply sparingly to affected area three times daily for 14 days. 15 g 1     No facility-administered encounter medications on file as of 4/25/2018.        Review Of Systems:  Skin: As above  Eyes: negative  Ears/Nose/Throat: negative  Respiratory: No shortness of breath, dyspnea on exertion, cough, or hemoptysis  Cardiovascular: negative  Gastrointestinal: negative  Genitourinary: negative  Musculoskeletal: negative  Neurologic: negative  Psychiatric: negative  Hematologic/Lymphatic/Immunologic: negative  Endocrine: negative      Objective:     /79  Pulse 73  LMP  (LMP Unknown)  SpO2 94%  Breastfeeding? No  Eyes: Conjunctivae/lids: Normal   ENT: Lips:  Normal  MSK: Normal  Pulm: Breathing Normal  Neuro/Psych: Orientation: Normal; Mood/Affect: Normal, NAD, WDWN  Following areas examined: face, neck, hands and forearms  Findings:    1) pink smooth patches on ventral hands. Small 1 cm x 2 cm plaque of pink papules on left dorsal 4th digit  2) Generalized flaking of skin of hands  Assessment and Plan:  1) Dyshidrotic eczema  Clobetasol 3-4 times a day during flare. Can use kenalog 1% 3-4 times when flaring and if not resolving can do Clobetasol.    Side effects of topical steroids including  but not limited to atrophy (skin thinning), striae (stretch marks) telangiectasias, steroid acne, and others.     At night Clobetasol, Vaseline, wet glove then dry glove when hands flare.    2) xerosis    Proper skin care from [facility]:    -Eliminate harsh soaps as they strip the natural oils from the skin, often resulting in dry itchy skin ( i.e. Dial, Zest, [place] Spring)  -Use mild soaps such as Cetaphil or Dove Sensitive Skin in the shower. You do not need to use soap on arms, legs, and trunk every time you shower unless visibly soiled.   -Avoid hot or cold showers.  -After showering, lightly dry off and apply moisturizing within 2-3 minutes. This will help trap moisture in the skin.   -Aggressive use of a moisturizer at least 1-2 times a day to the entire body (including -Vanicream, Cetaphil, Aquaphor or Cerave) and moisturize hands after every washing.  -We recommend using moisturizers that come in a tub that needs to be scooped out, not a pump. This has more of an oil base. It will hold moisture in your skin much better than a water base moisturizer. The above recommended are non-pore clogging.             Follow up in prn

## 2018-04-25 NOTE — MR AVS SNAPSHOT
After Visit Summary   4/25/2018    Melva Saleh    MRN: [id number]           Patient Information     Date Of Birth          1955        Visit Information        Provider Department      4/25/2018 3:00 PM Toma Magana PA-C [facility]        Care Instructions    Dyshidrotic eczema    Clobetasol 3-4 times a day during flare. Can use kenalog 1% 3-4 times when flaring and if not resolving can do Clobetasol.    Side effects of topical steroids including but not limited to atrophy (skin thinning), striae (stretch marks) telangiectasias, steroid acne, and others.       At night Clobetasol, Vaseline, wet glove then dry glove when hands flare.    Proper skin care from [facility]:    -Eliminate harsh soaps as they strip the natural oils from the skin, often resulting in dry itchy skin ( i.e. Dial, Zest, [place] Spring)  -Use mild soaps such as Cetaphil or Dove Sensitive Skin in the shower. You do not need to use soap on arms, legs, and trunk every time you shower unless visibly soiled.   -Avoid hot or cold showers.  -After showering, lightly dry off and apply moisturizing within 2-3 minutes. This will help trap moisture in the skin.   -Aggressive use of a moisturizer at least 1-2 times a day to the entire body (including -Vanicream, Cetaphil, Aquaphor or Cerave) and moisturize hands after every washing.  -We recommend using moisturizers that come in a tub that needs to be scooped out, not a pump. This has more of an oil base. It will hold moisture in your skin much better than a water base moisturizer. The above recommended are non-pore clogging.                     Follow-ups after your visit        Who to contact     If you have questions or need follow up information about today's clinic visit or your schedule please contact [facility] directly at [phone].  Normal or non-critical lab and imaging results will be communicated to you by  MyChart, letter or phone within 4 business days after the clinic has received the results. If you do not hear from us within 7 days, please contact the clinic through [employer] or phone. If you have a critical or abnormal lab result, we will notify you by phone as soon as possible.  Submit refill requests through [employer] or call your pharmacy and they will forward the refill request to us. Please allow 3 business days for your refill to be completed.          Additional Information About Your Visit        [employer] Information     [employer] gives you secure access to your electronic health record. If you see a primary care provider, you can also send messages to your care team and make appointments. If you have questions, please call your primary care clinic.  If you do not have a primary care provider, please call [phone] and they will assist you.        Care EveryWhere ID     This is your Care EveryWhere ID. This could be used by other organizations to access your [place] medical records  [id number]        Your Vitals Were     Pulse Last Period Pulse Oximetry Breastfeeding?          73 (LMP Unknown) 94% No         Blood Pressure from Last 3 Encounters:   04/25/18 115/79   01/02/18 116/79   11/16/17 122/74    Weight from Last 3 Encounters:   01/02/18 73.5 kg (162 lb)   11/16/17 75.1 kg (165 lb 8 oz)   10/18/17 75.5 kg (166 lb 6.4 oz)              Today, you had the following     No orders found for display       Primary Care Provider Office Phone # Fax #    Makayla Hawthorne [phone]       [address] [place] [address]        Equal Access to Services     [facility]: Hadii aad ku hadasho Soomaali, waaxda luqadaha, qaybta kaalmada keysha chua . So [facility] [phone].    ATENCIÓN: Si habla español, tiene a roach disposición servicios gratuitos de asistencia lingüística. Llame al [phone].    We comply with applicable federal civil rights laws and  [place] laws. We do not discriminate on the basis of race, color, national origin, age, disability, sex, sexual orientation, or gender identity.            Thank you!     Thank you for choosing [facility]  for your care. Our goal is always to provide you with excellent care. Hearing back from our patients is one way we can continue to improve our services. Please take a few minutes to complete the written survey that you may receive in the mail after your visit with us. Thank you!             Your Updated Medication List - Protect others around you: Learn how to safely use, store and throw away your medicines at www.disposemymeds.org.          This list is accurate as of 4/25/18  3:28 PM.  Always use your most recent med list.                   Brand Name Dispense Instructions for use Diagnosis    azithromycin 250 MG tablet    ZITHROMAX    6 tablet    2 tabs po qd day 1, then 1 tab po qd days 2-5    Infective tonsillitis, Lymphadenitis       clobetasol 0.05 % ointment    TEMOVATE    45 g    Apply sparingly to affected area twice daily prn. After two weeks take 1 wk break, then repeat prn.  Do not apply to face.    Dyshidrotic hand dermatitis       magic mouthwash suspension    ENTER INGREDIENTS IN COMMENTS    120 mL    Swish and spit 5-10 mLs in mouth every 6 hours as needed Pharmacy please compound  30 ml of Benadryl (12.5 mg/5 ml), 60 ml Maalox and 30 ml Viscous Lidocaine    Infective tonsillitis       pravastatin 20 MG tablet    PRAVACHOL    90 tablet    Take 1 tablet (20 mg) by mouth daily    Hyperlipidemia with target LDL less than 130       triamcinolone 0.1 % cream    KENALOG    15 g    Apply sparingly to affected area three times daily for 14 days.    Irritant contact dermatitis, unspecified trigger

## 2018-04-25 NOTE — PATIENT INSTRUCTIONS
Dyshidrotic eczema    Clobetasol 3-4 times a day during flare. Can use kenalog 1% 3-4 times when flaring and if not resolving can do Clobetasol.    Side effects of topical steroids including but not limited to atrophy (skin thinning), striae (stretch marks) telangiectasias, steroid acne, and others.       At night Clobetasol, Vaseline, wet glove then dry glove when hands flare.    Proper skin care from [facility]:    -Eliminate harsh soaps as they strip the natural oils from the skin, often resulting in dry itchy skin ( i.e. Dial, Zest, [place] Spring)  -Use mild soaps such as Cetaphil or Dove Sensitive Skin in the shower. You do not need to use soap on arms, legs, and trunk every time you shower unless visibly soiled.   -Avoid hot or cold showers.  -After showering, lightly dry off and apply moisturizing within 2-3 minutes. This will help trap moisture in the skin.   -Aggressive use of a moisturizer at least 1-2 times a day to the entire body (including -Vanicream, Cetaphil, Aquaphor or Cerave) and moisturize hands after every washing.  -We recommend using moisturizers that come in a tub that needs to be scooped out, not a pump. This has more of an oil base. It will hold moisture in your skin much better than a water base moisturizer. The above recommended are non-pore clogging.

## 2018-04-25 NOTE — NURSING NOTE
"Chief Complaint   Patient presents with     Derm Problem     eczema       Initial /79  Pulse 73  LMP  (LMP Unknown)  SpO2 94%  Breastfeeding? No Estimated body mass index is 23.92 kg/(m^2) as calculated from the following:    Height as of 11/16/17: 1.753 m (5' 9\").    Weight as of 1/2/18: 73.5 kg (162 lb).  Medication Reconciliation: complete  "

## 2018-04-25 NOTE — LETTER
4/25/2018         RE: Melva Saleh  [id number]  [place] [place] [id number]        Dear Colleague,    Thank you for referring your patient, Melva Saleh, to the [facility]. Please see a copy of my visit note below.    HPI:  Melva Saleh is a 63 year old year old female patient here today for rash on hands   Duration: 8 months on and off  Symptoms:  Itch, burning, blisters that don't pop, and thickened skin     Previous treatments: Clobetasol 1-2x a day for 10 days with great improvement     Alleviating/aggravating factors: no additional    Associated symptoms: none  Additional findings:none  Patient has no other skin complaints today.  Remainder of the HPI, Meds, PMH, Allergies, FH, and SH was reviewed in chart.      Past Medical History:   Diagnosis Date     Hyperlipidemia        Past Surgical History:   Procedure Laterality Date     TONSILLECTOMY & ADENOIDECTOMY  as a child        Family History   Problem Relation Age of Onset     Coronary Artery Disease Father      s/p PCI (stent)     Myocardial Infarction Paternal Grandfather 66     Breast Cancer Maternal Aunt      Breast Cancer Paternal Grandmother      DIABETES No family hx of      CEREBROVASCULAR DISEASE No family hx of      Ovarian Cancer No family hx of      Colon Cancer No family hx of        Social History     Social History     Marital status:      Spouse name: N/A     Number of children: N/A     Years of education: N/A     Occupational History     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 No      Bike Helmet Yes     Seat Belt Yes     Social History Narrative    .    3 adult children - live in area.    2 grandsons & one on the way (2016).    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.    No formal exercise currently.        Outpatient Encounter Prescriptions as of 4/25/2018   Medication Sig Dispense Refill     azithromycin (ZITHROMAX) 250 MG tablet 2 tabs po qd day 1, then 1 tab po qd days 2-5 6 tablet 0     clobetasol (TEMOVATE) 0.05 % ointment Apply sparingly to affected area twice daily prn. After two weeks take 1 wk break, then repeat prn.  Do not apply to face. 45 g 1     MAGIC MOUTHWASH, ENTER INGREDIENTS IN COMMENTS, Swish and spit 5-10 mLs in mouth every 6 hours as needed Pharmacy please compound  30 ml of Benadryl (12.5 mg/5 ml), 60 ml Maalox and 30 ml Viscous Lidocaine 120 mL 0     pravastatin (PRAVACHOL) 20 MG tablet Take 1 tablet (20 mg) by mouth daily 90 tablet 3     triamcinolone (KENALOG) 0.1 % cream Apply sparingly to affected area three times daily for 14 days. 15 g 1     No facility-administered encounter medications on file as of 4/25/2018.        Review Of Systems:  Skin: As above  Eyes: negative  Ears/Nose/Throat: negative  Respiratory: No shortness of breath, dyspnea on exertion, cough, or hemoptysis  Cardiovascular: negative  Gastrointestinal: negative  Genitourinary: negative  Musculoskeletal: negative  Neurologic: negative  Psychiatric: negative  Hematologic/Lymphatic/Immunologic: negative  Endocrine: negative      Objective:     /79  Pulse 73  LMP  (LMP Unknown)  SpO2 94%  Breastfeeding? No  Eyes: Conjunctivae/lids: Normal   ENT: Lips:  Normal  MSK: Normal  Pulm: Breathing Normal  Neuro/Psych: Orientation: Normal; Mood/Affect: Normal, NAD, WDWN  Following areas examined: face, neck, hands and forearms  Findings:    1) pink smooth patches on ventral hands. Small 1 cm x 2 cm plaque of pink papules on left dorsal 4th digit  2) Generalized flaking of skin of  hands  Assessment and Plan:  1) Dyshidrotic eczema  Clobetasol 3-4 times a day during flare. Can use kenalog 1% 3-4 times when flaring and if not resolving can do Clobetasol.    Side effects of topical steroids including but not limited to atrophy (skin thinning), striae (stretch marks) telangiectasias, steroid acne, and others.     At night Clobetasol, Vaseline, wet glove then dry glove when hands flare.    2) xerosis    Proper skin care from [facility]:    -Eliminate harsh soaps as they strip the natural oils from the skin, often resulting in dry itchy skin ( i.e. Dial, Zest, [place] Spring)  -Use mild soaps such as Cetaphil or Dove Sensitive Skin in the shower. You do not need to use soap on arms, legs, and trunk every time you shower unless visibly soiled.   -Avoid hot or cold showers.  -After showering, lightly dry off and apply moisturizing within 2-3 minutes. This will help trap moisture in the skin.   -Aggressive use of a moisturizer at least 1-2 times a day to the entire body (including -Vanicream, Cetaphil, Aquaphor or Cerave) and moisturize hands after every washing.  -We recommend using moisturizers that come in a tub that needs to be scooped out, not a pump. This has more of an oil base. It will hold moisture in your skin much better than a water base moisturizer. The above recommended are non-pore clogging.             Follow up in prn      Again, thank you for allowing me to participate in the care of your patient.        Sincerely,        Toma Magana PA-C

## 2018-05-13 ENCOUNTER — APPOINTMENT (OUTPATIENT)
Dept: CT IMAGING | Facility: CLINIC | Age: 63
End: 2018-05-13
Attending: EMERGENCY MEDICINE
Payer: COMMERCIAL

## 2018-05-13 ENCOUNTER — HOSPITAL ENCOUNTER (EMERGENCY)
Facility: CLINIC | Age: 63
Discharge: HOME | End: 2018-05-13
Attending: EMERGENCY MEDICINE | Admitting: EMERGENCY MEDICINE
Payer: COMMERCIAL

## 2018-05-13 VITALS
DIASTOLIC BLOOD PRESSURE: 70 MMHG | TEMPERATURE: 98.6 F | WEIGHT: 156 LBS | SYSTOLIC BLOOD PRESSURE: 121 MMHG | BODY MASS INDEX: 23.11 KG/M2 | OXYGEN SATURATION: 98 % | HEIGHT: 69 IN

## 2018-05-13 DIAGNOSIS — K57.32 DIVERTICULITIS OF COLON: ICD-10-CM

## 2018-05-13 LAB
ALBUMIN SERPL-MCNC: 4 G/DL (ref 3.4–5)
ALBUMIN UR-MCNC: 30 MG/DL
ALP SERPL-CCNC: 85 U/L (ref 40–150)
ALT SERPL W P-5'-P-CCNC: 56 U/L (ref 0–50)
AMORPH CRY #/AREA URNS HPF: ABNORMAL /HPF
ANION GAP SERPL CALCULATED.3IONS-SCNC: 9 MMOL/L (ref 3–14)
APPEARANCE UR: CLEAR
AST SERPL W P-5'-P-CCNC: 45 U/L (ref 0–45)
BASOPHILS # BLD AUTO: 0 10E9/L (ref 0–0.2)
BASOPHILS NFR BLD AUTO: 0.1 %
BILIRUB SERPL-MCNC: 0.8 MG/DL (ref 0.2–1.3)
BILIRUB UR QL STRIP: NEGATIVE
BUN SERPL-MCNC: 12 MG/DL (ref 7–30)
CALCIUM SERPL-MCNC: 9.2 MG/DL (ref 8.5–10.1)
CHLORIDE SERPL-SCNC: 103 MMOL/L (ref 94–109)
CO2 SERPL-SCNC: 26 MMOL/L (ref 20–32)
COLOR UR AUTO: YELLOW
CREAT SERPL-MCNC: 0.67 MG/DL (ref 0.52–1.04)
DIFFERENTIAL METHOD BLD: ABNORMAL
EOSINOPHIL # BLD AUTO: 0.1 10E9/L (ref 0–0.7)
EOSINOPHIL NFR BLD AUTO: 0.5 %
ERYTHROCYTE [DISTWIDTH] IN BLOOD BY AUTOMATED COUNT: 13 % (ref 10–15)
GFR SERPL CREATININE-BSD FRML MDRD: 88 ML/MIN/1.7M2
GLUCOSE SERPL-MCNC: 133 MG/DL (ref 70–99)
GLUCOSE UR STRIP-MCNC: NEGATIVE MG/DL
HCT VFR BLD AUTO: 45.1 % (ref 35–47)
HGB BLD-MCNC: 15.1 G/DL (ref 11.7–15.7)
HGB UR QL STRIP: ABNORMAL
IMM GRANULOCYTES # BLD: 0.1 10E9/L (ref 0–0.4)
IMM GRANULOCYTES NFR BLD: 0.4 %
KETONES UR STRIP-MCNC: 40 MG/DL
LEUKOCYTE ESTERASE UR QL STRIP: ABNORMAL
LIPASE SERPL-CCNC: 108 U/L (ref 73–393)
LYMPHOCYTES # BLD AUTO: 1.6 10E9/L (ref 0.8–5.3)
LYMPHOCYTES NFR BLD AUTO: 9.8 %
MCH RBC QN AUTO: 28.9 PG (ref 26.5–33)
MCHC RBC AUTO-ENTMCNC: 33.5 G/DL (ref 31.5–36.5)
MCV RBC AUTO: 86 FL (ref 78–100)
MONOCYTES # BLD AUTO: 1.2 10E9/L (ref 0–1.3)
MONOCYTES NFR BLD AUTO: 7.1 %
MUCOUS THREADS #/AREA URNS LPF: PRESENT /LPF
NEUTROPHILS # BLD AUTO: 13.6 10E9/L (ref 1.6–8.3)
NEUTROPHILS NFR BLD AUTO: 82.1 %
NITRATE UR QL: NEGATIVE
NRBC # BLD AUTO: 0 10*3/UL
NRBC BLD AUTO-RTO: 0 /100
PH UR STRIP: 8 PH (ref 5–7)
PLATELET # BLD AUTO: 211 10E9/L (ref 150–450)
POTASSIUM SERPL-SCNC: 3.8 MMOL/L (ref 3.4–5.3)
PROT SERPL-MCNC: 7.8 G/DL (ref 6.8–8.8)
RBC # BLD AUTO: 5.22 10E12/L (ref 3.8–5.2)
RBC #/AREA URNS AUTO: 18 /HPF (ref 0–2)
SODIUM SERPL-SCNC: 138 MMOL/L (ref 133–144)
SOURCE: ABNORMAL
SP GR UR STRIP: 1.02 (ref 1–1.03)
UROBILINOGEN UR STRIP-MCNC: NORMAL MG/DL (ref 0–2)
WBC # BLD AUTO: 16.6 10E9/L (ref 4–11)
WBC #/AREA URNS AUTO: 6 /HPF (ref 0–5)

## 2018-05-13 PROCEDURE — 74177 CT ABD & PELVIS W/CONTRAST: CPT

## 2018-05-13 PROCEDURE — 81001 URINALYSIS AUTO W/SCOPE: CPT | Performed by: EMERGENCY MEDICINE

## 2018-05-13 PROCEDURE — 83690 ASSAY OF LIPASE: CPT | Performed by: EMERGENCY MEDICINE

## 2018-05-13 PROCEDURE — 25000128 H RX IP 250 OP 636: Performed by: EMERGENCY MEDICINE

## 2018-05-13 PROCEDURE — 25000132 ZZH RX MED GY IP 250 OP 250 PS 637: Performed by: EMERGENCY MEDICINE

## 2018-05-13 PROCEDURE — 25000125 ZZHC RX 250: Performed by: EMERGENCY MEDICINE

## 2018-05-13 PROCEDURE — 80053 COMPREHEN METABOLIC PANEL: CPT | Performed by: EMERGENCY MEDICINE

## 2018-05-13 PROCEDURE — 85025 COMPLETE CBC W/AUTO DIFF WBC: CPT | Performed by: EMERGENCY MEDICINE

## 2018-05-13 PROCEDURE — 99285 EMERGENCY DEPT VISIT HI MDM: CPT | Mod: 25

## 2018-05-13 RX ORDER — CIPROFLOXACIN 500 MG/1
500 TABLET, FILM COATED ORAL 2 TIMES DAILY
Qty: 20 TABLET | Refills: 0 | Status: SHIPPED | OUTPATIENT
Start: 2018-05-13 | End: 2018-05-23

## 2018-05-13 RX ORDER — IOPAMIDOL 755 MG/ML
79 INJECTION, SOLUTION INTRAVASCULAR ONCE
Status: COMPLETED | OUTPATIENT
Start: 2018-05-13 | End: 2018-05-13

## 2018-05-13 RX ORDER — METRONIDAZOLE 500 MG/1
500 TABLET ORAL 3 TIMES DAILY
Qty: 30 TABLET | Refills: 0 | Status: SHIPPED | OUTPATIENT
Start: 2018-05-13 | End: 2018-05-23

## 2018-05-13 RX ORDER — CIPROFLOXACIN 250 MG/1
250 TABLET, FILM COATED ORAL ONCE
Status: COMPLETED | OUTPATIENT
Start: 2018-05-13 | End: 2018-05-13

## 2018-05-13 RX ORDER — METRONIDAZOLE 500 MG/1
500 TABLET ORAL ONCE
Status: COMPLETED | OUTPATIENT
Start: 2018-05-13 | End: 2018-05-13

## 2018-05-13 RX ADMIN — IOPAMIDOL 79 ML: 755 INJECTION, SOLUTION INTRAVENOUS at 10:24

## 2018-05-13 RX ADMIN — CIPROFLOXACIN HYDROCHLORIDE 250 MG: 250 TABLET, FILM COATED ORAL at 11:37

## 2018-05-13 RX ADMIN — METRONIDAZOLE 500 MG: 500 TABLET ORAL at 11:37

## 2018-05-13 RX ADMIN — SODIUM CHLORIDE 64 ML: 9 INJECTION, SOLUTION INTRAVENOUS at 10:25

## 2018-05-13 ASSESSMENT — ENCOUNTER SYMPTOMS
ABDOMINAL PAIN: 1
COUGH: 0
DIARRHEA: 0
DYSURIA: 0
SHORTNESS OF BREATH: 0
NAUSEA: 1

## 2018-05-13 NOTE — DISCHARGE INSTRUCTIONS
Diverticulitis    Some people get pouches along the wall of the colon as they get older. The pouches, called diverticuli, usually cause no symptoms. If the pouches become blocked, you can get an infection. This infection is called diverticulitis. It causes pain in your lower abdomen and fever. If not treated, it can become a serious condition, causing an abscess to form inside the pouch. The abscess may block the intestinal tract even or rupture, spreading infection throughout the abdomen.  When treatment is started early, oral antibiotics alone may be enough to cure diverticulitis. This method is tried first. But, if you don't improve or if your condition gets worse while using oral antibiotics, you may need to be admitted to the hospital for IV antibiotics. Severe cases may require surgery.  Home care  The following guidelines will help you care for yourself at home:    During the acute illness, rest and follow your healthcare provider's instructions about diet. Sometimes you will need to follow a clear liquid diet to rest your bowel. Once your symptoms are better, you may be told to follow a low-fiber diet for some time. Include foods like:  ? Flake cereal, mashed potatoes, pancakes, waffles, pasta, white bread, rice, applesauce, bananas, eggs, fish, poultry, tofu, and cooked soft vegetables    Take antibiotics exactly as instructed. Don't miss any doses or stop taking the medication, even if you feel better.    Monitor your temperature and tell your healthcare provider if you have rising temperatures.  Preventing future attacks  Once you have an episode of diverticulitis, you are at risk for having it again. After you have recovered from this episode, you may be able to lower your risk by eating a high-fiber diet (20 gm/day to 35 gm/day of fiber). This cleans out the colon pouches that already exist and may prevent new ones from forming. Foods high in fiber include fresh fruits and edible peelings, raw or  lightly cooked vegetables, whole grain cereals and breads, dried beans and peas, and bran.  Other steps that can help prevent future attacks include:    Take your medicines, such as antibiotics, as your healthcare provider says.    Drink 6 to 8 glasses of water every day, unless told otherwise.    Use a heating pad or hot water bottle to help abdominal cramping or pain.    Begin an exercise program. Ask your healthcare provider how to get started. You can benefit from simple activities such as walking or gardening.    Treat diarrhea with a bland diet. Start with liquids only; then slowly add fiber over time.    Watch for changes in your bowel movements (constipation to diarrhea). Avoid constipation by eating a high fiber diet and taking a stool softener if needed.    Get plenty of rest and sleep.  Follow-up care  Follow up with your healthcare provider as advised or sooner if you are not getting better in the next 2 days.  When to seek medical advice  Call your healthcare provider right away if any of these occur:    Fever of 100.4 F (38 C) or higher, or as directed by your healthcare provider    Repeated vomiting or swelling of the abdomen    Weakness, dizziness, light-headedness    Pain in your abdomen that gets worse, severe, or spreads to your back    Pain that moves to the right lower abdomen    Rectal bleeding (stools that are red, black or maroon color)    Unexpected vaginal bleeding  Date Last Reviewed: 9/1/2016 2000-2017 [employer]. [address] [place] [address]. All rights reserved. This information is not intended as a substitute for professional medical care. Always follow your healthcare professional's instructions.

## 2018-05-13 NOTE — ED AVS SNAPSHOT
Emergency Department    [address] [place] [address]    Phone:  [phone]    Fax:  [phone]                                       Melva Saleh   MRN: [id number]    Department:   Emergency Department   Date of Visit:  5/13/2018           After Visit Summary Signature Page     I have received my discharge instructions, and my questions have been answered. I have discussed any challenges I see with this plan with the nurse or doctor.    ..........................................................................................................................................  Patient/Patient Representative Signature      ..........................................................................................................................................  Patient Representative Print Name and Relationship to Patient    ..................................................               ................................................  Date                                            Time    ..........................................................................................................................................  Reviewed by Signature/Title    ...................................................              ..............................................  Date                                                            Time

## 2018-05-13 NOTE — ED PROVIDER NOTES
"  History     Chief Complaint:  Abdominal pain     HPI   Melva Saleh is a 63 year old female, with a history of hyperlipidemia, who presents with abdominal pain. The patient states that she had developed right lower quadrant abdominal pain on 5/9 evening describing it as abdominal cramping in nature. She had resolution of symptoms, but they had returned the following night. The patient states that after the onset of abdominal cramping, she had a softer bowel movement with more urgency, but no diarrhea. Since then, the patient states that she has continued to have right lower quadrant abdominal pain, cramp like in nature, prompting her ED visit. Here, the patient endorsed slight nausea and subjective fever prior to arrival, but denies any chest pain, shortness of breath, blood in stool, cough, dysuria or other urinary symptoms as well as past history of abdominal surgery. She denies any history of kidney stones.       Allergies:  Augmentin  Erythromycin  Lactose  Penicillins      Medications:    Clobetasol  Pravastatin    Past Medical History:    Hyperlipidemia    Past Surgical History:    Tonsillectomy and adenoidectomy    Family History:    CAD    Social History:  Presents with her .   Positive for alcohol use.    Negative for tobacco use.   Marital Status:   [2]     Review of Systems   Respiratory: Negative for cough and shortness of breath.    Gastrointestinal: Positive for abdominal pain and nausea. Negative for diarrhea.   Genitourinary: Negative for dysuria.   All other systems reviewed and are negative.      Physical Exam     Patient Vitals for the past 24 hrs:   BP Temp Temp src Heart Rate SpO2 Height Weight   05/13/18 0926 121/70 98.6  F (37  C) Oral 108 98 % 1.753 m (5' 9\") 70.8 kg (156 lb)        Physical Exam    Physical Exam   Constitutional:  Patient is oriented to person, place, and time. They appear well-developed and well-nourished. Mild distress secondary to abdominal pain.    HENT: "   Mouth/Throat:   Oropharynx is clear and moist.   Eyes:    Conjunctivae normal and EOM are normal. Pupils are equal, round, and reactive to light.   Neck:    Normal range of motion.   Cardiovascular: Normal rate, regular rhythm and normal heart sounds.  Exam reveals no gallop and no friction rub.  No murmur heard.  Pulmonary/Chest:  Effort normal and breath sounds normal. Patient has no wheezes. Patient has no rales.   Abdominal:   Soft. Bowel sounds are normal. Patient exhibits no mass. Right sided abdominal pain. No rebound or guarding. CVA tenderness.  Musculoskeletal:  Normal range of motion. Patient exhibits no edema.   Neurological:   Patient is alert and oriented to person, place, and time. Patient has normal strength. No cranial nerve deficit or sensory deficit. GCS 15  Skin:   Skin is warm and dry. No rash noted. No erythema.   Psychiatric:   Patient has a normal mood and affect. Patient's behavior is normal. Judgment and thought content normal.        Emergency Department Course     Imaging:  Radiographic findings were communicated with the patient who voiced understanding of the findings.  CT Abdomen/Pelvis w/o IV contrast-stone protocol:   1. Acute diverticulitis mid to distal sigmoid colon. No associated  abscess or free intraperitoneal air.  2. Nonobstructing stones right renal collecting system measuring up to  1.2 cm in size. No hydronephrosis or ureteral calculi. No left urinary  tract calculi or hydronephrosis.  3. Low-attenuation subcentimeter liver lesion(s) compatible with  benign cysts or other benign lesions. No specific evaluation or  follow-up is recommended in a low risk patient.   4. Low-attenuation subcentimeter renal lesion(s). These are compatible  with small benign cysts and no specific imaging evaluation or  follow-up is recommended. As per radiology.     Laboratory:  CBC: WBC: 16.6 (H), HGB: 15.1, PLT: 211  CMP: Glucose 133 (H), ALT: 56 (H), o/w WNL (Creatinine: 0.67)    Lipase:  108  UA with micro: mucous present, trace leukocyte esterase, few amorphous crystals, moderate blood, Protein albumin: 30, pH: 8.0 (H), urine ketone: 40, WBC: 6 (H), RBC: 18 (H) o/w negative     Interventions:  1120: Cipro 500 mg PO  1120: Flagyl 500 mg PO    Emergency Department Course:  Nursing notes and vitals reviewed.     0949  I performed an exam of the patient as documented above.     Blood drawn. This was sent to the lab for further testing, results above. The patient provided a urine sample here in the emergency department. This was sent for laboratory testing, findings above.      The patient was sent for an abdominal CT while in the emergency department, findings above. IV inserted. Medicine administered as documented above.     1115 I rechecked the patient and discussed the results of her workup thus far.     Findings and plan explained to the Patient. Patient discharged home with instructions regarding supportive care, medications, and reasons to return. The importance of close follow-up was reviewed. The patient was prescribed Cipro and Flagyl.     I personally reviewed the laboratory results with the Patient and answered all related questions prior to discharge.       Impression & Plan      Medical Decision Making:  Melva Saleh is a 63 year old female who presents for evaluation of abdominal pain.  A broad differential diagnosis was considered and CT confirms diverticulitis without abscess or perforation; this appears consistent with the history and physical exam so I doubt another underlying etiology is also present. She does state she has had routine colonoscopies in the past that have noted diverticula. The patient's pain has been controlled by interventions in the emergency department.  This represents uncomplicated diverticulitis at this time.  There are no signs of sepsis, shock or bacteremia.  The natural history of this problem was discussed, and I educated the patient regarding the symptoms  and signs that should prompt return to the Emergency Department.  This would include worsening fevers, chills, vomiting, and more intense pain. The patient was also made aware of the incidental finding of renal stones. The patient is to take antibiotics and pain medications as directed.    Follow-up with primary care physician is indicated in 1-2 days.     Diagnosis:    ICD-10-CM   1. Diverticulitis of colon K57.32       Disposition:  discharged to home    Discharge Medications:  New Prescriptions    CIPROFLOXACIN (CIPRO) 500 MG TABLET    Take 1 tablet (500 mg) by mouth 2 times daily for 10 days    METRONIDAZOLE (FLAGYL) 500 MG TABLET    Take 1 tablet (500 mg) by mouth 3 times daily for 10 days     IMora, naila serving as a scribe on 5/13/2018 at 9:49 AM to personally document services performed by Ana Paula Jones MD based on my observations and the provider's statements to me.      Mora Marrero  5/13/2018    EMERGENCY DEPARTMENT       Ana Paula Jones MD  05/13/18 9664

## 2018-05-13 NOTE — ED AVS SNAPSHOT
Emergency Department    [address] [facility] [address]    Phone:  [phone]    Fax:  [phone]                                       Melva Saleh   MRN: [id number]    Department:   Emergency Department   Date of Visit:  5/13/2018           Patient Information     Date Of Birth          1955        Your diagnoses for this visit were:     Diverticulitis of colon        You were seen by Ana Paula Jones MD.      Follow-up Information     Follow up with Makayla Hawthorne MD In 3 days.    Specialty:  Internal Medicine    Contact information:    [address] [facility] [address]  [phone]          Discharge Instructions         Diverticulitis    Some people get pouches along the wall of the colon as they get older. The pouches, called diverticuli, usually cause no symptoms. If the pouches become blocked, you can get an infection. This infection is called diverticulitis. It causes pain in your lower abdomen and fever. If not treated, it can become a serious condition, causing an abscess to form inside the pouch. The abscess may block the intestinal tract even or rupture, spreading infection throughout the abdomen.  When treatment is started early, oral antibiotics alone may be enough to cure diverticulitis. This method is tried first. But, if you don't improve or if your condition gets worse while using oral antibiotics, you may need to be admitted to the hospital for IV antibiotics. Severe cases may require surgery.  Home care  The following guidelines will help you care for yourself at home:    During the acute illness, rest and follow your healthcare provider's instructions about diet. Sometimes you will need to follow a clear liquid diet to rest your bowel. Once your symptoms are better, you may be told to follow a low-fiber diet for some time. Include foods like:  ? Flake cereal, mashed potatoes, pancakes, waffles, pasta, white bread, rice, applesauce, bananas, eggs, fish,  poultry, tofu, and cooked soft vegetables    Take antibiotics exactly as instructed. Don't miss any doses or stop taking the medication, even if you feel better.    Monitor your temperature and tell your healthcare provider if you have rising temperatures.  Preventing future attacks  Once you have an episode of diverticulitis, you are at risk for having it again. After you have recovered from this episode, you may be able to lower your risk by eating a high-fiber diet (20 gm/day to 35 gm/day of fiber). This cleans out the colon pouches that already exist and may prevent new ones from forming. Foods high in fiber include fresh fruits and edible peelings, raw or lightly cooked vegetables, whole grain cereals and breads, dried beans and peas, and bran.  Other steps that can help prevent future attacks include:    Take your medicines, such as antibiotics, as your healthcare provider says.    Drink 6 to 8 glasses of water every day, unless told otherwise.    Use a heating pad or hot water bottle to help abdominal cramping or pain.    Begin an exercise program. Ask your healthcare provider how to get started. You can benefit from simple activities such as walking or gardening.    Treat diarrhea with a bland diet. Start with liquids only; then slowly add fiber over time.    Watch for changes in your bowel movements (constipation to diarrhea). Avoid constipation by eating a high fiber diet and taking a stool softener if needed.    Get plenty of rest and sleep.  Follow-up care  Follow up with your healthcare provider as advised or sooner if you are not getting better in the next 2 days.  When to seek medical advice  Call your healthcare provider right away if any of these occur:    Fever of 100.4 F (38 C) or higher, or as directed by your healthcare provider    Repeated vomiting or swelling of the abdomen    Weakness, dizziness, light-headedness    Pain in your abdomen that gets worse, severe, or spreads to your back    Pain  that moves to the right lower abdomen    Rectal bleeding (stools that are red, black or maroon color)    Unexpected vaginal bleeding  Date Last Reviewed: 9/1/2016 2000-2017 [employer] [address] [place] [address], [place] [address]. All rights reserved. This information is not intended as a substitute for professional medical care. Always follow your healthcare professional's instructions.          24 Hour Appointment Hotline       To make an appointment at any [facility], call [phone] ([phone]). If you don't have a family doctor or clinic, we will help you find one. [facility] are conveniently located to serve the needs of you and your family.             Review of your medicines      START taking        Dose / Directions Last dose taken    ciprofloxacin 500 MG tablet   Commonly known as:  CIPRO   Dose:  500 mg   Quantity:  20 tablet        Take 1 tablet (500 mg) by mouth 2 times daily for 10 days   Refills:  0        metroNIDAZOLE 500 MG tablet   Commonly known as:  FLAGYL   Dose:  500 mg   Quantity:  30 tablet        Take 1 tablet (500 mg) by mouth 3 times daily for 10 days   Refills:  0          Our records show that you are taking the medicines listed below. If these are incorrect, please call your family doctor or clinic.        Dose / Directions Last dose taken    azithromycin 250 MG tablet   Commonly known as:  ZITHROMAX   Quantity:  6 tablet        2 tabs po qd day 1, then 1 tab po qd days 2-5   Refills:  0        clobetasol 0.05 % ointment   Commonly known as:  TEMOVATE   Quantity:  45 g        Apply sparingly to affected area twice daily prn. After two weeks take 1 wk break, then repeat prn.  Do not apply to face.   Refills:  1        magic mouthwash suspension   Commonly known as:  ENTER INGREDIENTS IN COMMENTS   Dose:  5-10 mL   Quantity:  120 mL        Swish and spit 5-10 mLs in mouth every 6 hours as needed Pharmacy please compound  30 ml of Benadryl (12.5 mg/5  ml), 60 ml Maalox and 30 ml Viscous Lidocaine   Refills:  0        pravastatin 20 MG tablet   Commonly known as:  PRAVACHOL   Dose:  20 mg   Quantity:  90 tablet        Take 1 tablet (20 mg) by mouth daily   Refills:  3        triamcinolone 0.1 % cream   Commonly known as:  KENALOG   Quantity:  15 g        Apply sparingly to affected area three times daily for 14 days.   Refills:  1                Prescriptions were sent or printed at these locations (2 Prescriptions)                   Other Prescriptions                Printed at Department/Unit printer (2 of 2)         ciprofloxacin (CIPRO) 500 MG tablet               metroNIDAZOLE (FLAGYL) 500 MG tablet                Procedures and tests performed during your visit     CBC with platelets + differential    CT Abdomen Pelvis w Contrast    Comprehensive metabolic panel    Give 20 ounces of water 15 minutes before CT of abdomen    Lipase    UA with Microscopic      Orders Needing Specimen Collection     None      Pending Results     No orders found from 5/11/2018 to 5/14/2018.            Pending Culture Results     No orders found from 5/11/2018 to 5/14/2018.            Pending Results Instructions     If you had any lab results that were not finalized at the time of your Discharge, you can call the ED Lab Result RN at [phone]. You will be contacted by this team for any positive Lab results or changes in treatment. The nurses are available 7 days a week from 10A to 6:30P.  You can leave a message 24 hours per day and they will return your call.        Test Results From Your Hospital Stay        5/13/2018  9:56 AM      Component Results     Component Value Ref Range & Units Status    WBC 16.6 (H) 4.0 - 11.0 10e9/L Final    RBC Count 5.22 (H) 3.8 - 5.2 10e12/L Final    Hemoglobin 15.1 11.7 - 15.7 g/dL Final    Hematocrit 45.1 35.0 - 47.0 % Final    MCV 86 78 - 100 fl Final    MCH 28.9 26.5 - 33.0 pg Final    MCHC 33.5 31.5 - 36.5 g/dL Final    RDW 13.0 10.0 -  15.0 % Final    Platelet Count 211 150 - 450 10e9/L Final    Diff Method Automated Method  Final    % Neutrophils 82.1 % Final    % Lymphocytes 9.8 % Final    % Monocytes 7.1 % Final    % Eosinophils 0.5 % Final    % Basophils 0.1 % Final    % Immature Granulocytes 0.4 % Final    Nucleated RBCs 0 0 /100 Final    Absolute Neutrophil 13.6 (H) 1.6 - 8.3 10e9/L Final    Absolute Lymphocytes 1.6 0.8 - 5.3 10e9/L Final    Absolute Monocytes 1.2 0.0 - 1.3 10e9/L Final    Absolute Eosinophils 0.1 0.0 - 0.7 10e9/L Final    Absolute Basophils 0.0 0.0 - 0.2 10e9/L Final    Abs Immature Granulocytes 0.1 0 - 0.4 10e9/L Final    Absolute Nucleated RBC 0.0  Final         5/13/2018 10:13 AM      Component Results     Component Value Ref Range & Units Status    Sodium 138 133 - 144 mmol/L Final    Potassium 3.8 3.4 - 5.3 mmol/L Final    Chloride 103 94 - 109 mmol/L Final    Carbon Dioxide 26 20 - 32 mmol/L Final    Anion Gap 9 3 - 14 mmol/L Final    Glucose 133 (H) 70 - 99 mg/dL Final    Urea Nitrogen 12 7 - 30 mg/dL Final    Creatinine 0.67 0.52 - 1.04 mg/dL Final    GFR Estimate 88 >60 mL/min/1.7m2 Final    Non  GFR Calc    GFR Estimate If Black >90 >60 mL/min/1.7m2 Final    African American GFR Calc    Calcium 9.2 8.5 - 10.1 mg/dL Final    Bilirubin Total 0.8 0.2 - 1.3 mg/dL Final    Albumin 4.0 3.4 - 5.0 g/dL Final    Protein Total 7.8 6.8 - 8.8 g/dL Final    Alkaline Phosphatase 85 40 - 150 U/L Final    ALT 56 (H) 0 - 50 U/L Final    AST 45 0 - 45 U/L Final         5/13/2018 10:12 AM      Component Results     Component Value Ref Range & Units Status    Lipase 108 73 - 393 U/L Final         5/13/2018 10:27 AM      Component Results     Component Value Ref Range & Units Status    Color Urine Yellow  Final    Appearance Urine Clear  Final    Glucose Urine Negative NEG^Negative mg/dL Final    Bilirubin Urine Negative NEG^Negative Final    Ketones Urine 40 (A) NEG^Negative mg/dL Final    Specific Gravity Urine 1.017  1.003 - 1.035 Final    Blood Urine Moderate (A) NEG^Negative Final    pH Urine 8.0 (H) 5.0 - 7.0 pH Final    Protein Albumin Urine 30 (A) NEG^Negative mg/dL Final    Urobilinogen mg/dL Normal 0.0 - 2.0 mg/dL Final    Nitrite Urine Negative NEG^Negative Final    Leukocyte Esterase Urine Trace (A) NEG^Negative Final    Source Midstream Urine  Final    WBC Urine 6 (H) 0 - 5 /HPF Final    RBC Urine 18 (H) 0 - 2 /HPF Final    Mucous Urine Present (A) NEG^Negative /LPF Final    Amorphous Crystals Few (A) NEG^Negative /HPF Final         5/13/2018 11:21 AM      Narrative     CT ABDOMEN/PELVIS WITH CONTRAST May 13, 2018 10:39 AM     HISTORY: Right-sided pain.    TECHNIQUE: Axial images from the lung bases to the symphysis are  performed with additional coronal reformatted images. 79 mL of Isovue  370 are given intravenously. Radiation dose for this scan was reduced  using automated exposure control, adjustment of the mA and/or kV  according to patient size, or iterative reconstruction technique.    FINDINGS: The lung bases are clear.    Abdomen: A few subcentimeter low-attenuation liver lesions are present  most likely benign cysts. The gallbladder, pancreas, adrenal glands  and spleen are within normal limits. Small cysts are noted within the  left kidney. These appear to be simple cysts. No left urinary tract  calculi or hydronephrosis. Nonobstructing stones are present in the  right renal collecting system with a dominant stone measuring 1.2 x  0.7 cm on image 27. No hydronephrosis or ureteral calculi. The bowel  is normal in caliber without obstruction. Appendix is normal. Focal  inflammation and colonic wall thickening is noted in the mid to distal  sigmoid colon on image 61 consistent with acute diverticulitis. No  associated abscess or free intraperitoneal air is appreciated.    Pelvis: The bladder is decompressed. The rectum is unremarkable. The  uterus and adnexal regions are within normal limits. Trace amount  of  free pelvic fluid is present likely related to the diverticular  inflammation. No significant lymph node enlargement. Bone window  examination demonstrates mild degenerative spine changes.        Impression     IMPRESSION:  1. Acute diverticulitis mid to distal sigmoid colon. No associated  abscess or free intraperitoneal air.  2. Nonobstructing stones right renal collecting system measuring up to  1.2 cm in size. No hydronephrosis or ureteral calculi. No left urinary  tract calculi or hydronephrosis.  3. Low-attenuation subcentimeter liver lesion(s) compatible with  benign cysts or other benign lesions. No specific evaluation or  follow-up is recommended in a low risk patient.   4. Low-attenuation subcentimeter renal lesion(s). These are compatible  with small benign cysts and no specific imaging evaluation or  follow-up is recommended.    TRACEY CASTRO MD                Clinical Quality Measure: Blood Pressure Screening     Your blood pressure was checked while you were in the emergency department today. The last reading we obtained was  BP: 121/70 . Please read the guidelines below about what these numbers mean and what you should do about them.  If your systolic blood pressure (the top number) is less than 120 and your diastolic blood pressure (the bottom number) is less than 80, then your blood pressure is normal. There is nothing more that you need to do about it.  If your systolic blood pressure (the top number) is 120-139 or your diastolic blood pressure (the bottom number) is 80-89, your blood pressure may be higher than it should be. You should have your blood pressure rechecked within a year by a primary care provider.  If your systolic blood pressure (the top number) is 140 or greater or your diastolic blood pressure (the bottom number) is 90 or greater, you may have high blood pressure. High blood pressure is treatable, but if left untreated over time it can put you at risk for heart attack, stroke,  or kidney failure. You should have your blood pressure rechecked by a primary care provider within the next 4 weeks.  If your provider in the emergency department today gave you specific instructions to follow-up with your doctor or provider even sooner than that, you should follow that instruction and not wait for up to 4 weeks for your follow-up visit.        Thank you for choosing [facility]       Thank you for choosing [facility] for your care. Our goal is always to provide you with excellent care. Hearing back from our patients is one way we can continue to improve our services. Please take a few minutes to complete the written survey that you may receive in the mail after you visit with us. Thank you!        [employer] Information     [email or web address] gives you secure access to your electronic health record. If you see a primary care provider, you can also send messages to your care team and make appointments. If you have questions, please call your primary care clinic.  If you do not have a primary care provider, please call [phone] and they will assist you.        Care EveryWhere ID     This is your Care EveryWhere ID. This could be used by other organizations to access your [facility] medical records  [id number]        Equal Access to Services     ALVIN MERAZ : Haddelores Samano, fadumo castro, keysha smith. So [facility] [phone].    ATENCIÓN: Si habla español, tiene a roach disposición servicios gratuitos de asistencia lingüística. Jing al [phone].    We comply with applicable federal civil rights laws and [place] laws. We do not discriminate on the basis of race, color, national origin, age, disability, sex, sexual orientation, or gender identity.            After Visit Summary       This is your record. Keep this with you and show to your community pharmacist(s) and doctor(s) at your next visit.

## 2018-05-15 ENCOUNTER — TELEPHONE (OUTPATIENT)
Dept: INTERNAL MEDICINE | Facility: CLINIC | Age: 63
End: 2018-05-15

## 2018-05-15 NOTE — TELEPHONE ENCOUNTER
"On Sunday (5/13) 2-3 days ago, pt was seen at [facility] ED for Diverticulitis of colon. She was put on 2 new meds Cipro and Flagyl. And her abd pain has decreased, she continues to feel fatigued. But her BM's have been pretty hard. She did have a BM this morning, which she says was \"hard\" and she needed to strain, but she did not have any pain when pass the BM.  Pt was seen is calling with questions about what she can eat after her diagnosis of \"Diverticulitis of colon,\" and Pt is wondering what she can eat / or do, to help soften the stool? Informed her of info below, as well as taking OTC Miralax daily, drinking lots of water, prune juice, apple juice, or pear juice, BRAT diet. Pt has hospital f/u appt on Friday 5/18 with Dr. Hawthorne.      Diet for Diverticular Disease  What is diverticular disease?   When the inner wall of your colon weakens and forms small pouches, you have diverticulosis. For most people, this causes no symptoms.   If the pouches become inflamed or infected, you have diverticulitis. Warning signs may include pain in the lower abdomen, chills and vomiting (throwing up).  These conditions are called diverticular disease.  What causes it?  The cause has been linked to many years of eating too little fiber. People who eat plenty of whole grains, fresh fruits and vegetables are less likely to get this disease.   Once the pouches have formed, there is no way to reverse them.   How much fiber should I get?  If you're healing from diverticulitis or surgery to remove the inflamed area, you will at first follow a low-fiber diet (less than 10 grams each day). Then, as your doctor advises, you may slowly add fiber. Increase your intake by 1 to 2 grams a day. Your goal will be 25 to 30 grams a day.   To avoid future problems, continue to get 25 to 30 grams of fiber each day.   How can fiber help?   A high-fiber diet will help you have regular bowel movements. Fiber adds bulk to the stool and helps it pass more " easily. Fiber also helps prevent the pouches from growing larger or getting infected.  In the past, doctors have warned patients to avoid eating nuts, seeds and popcorn. They believed these foods could get caught in the pouches and inflame them. But recent studies do not support this idea.

## 2018-05-18 ENCOUNTER — OFFICE VISIT (OUTPATIENT)
Dept: INTERNAL MEDICINE | Facility: CLINIC | Age: 63
End: 2018-05-18
Payer: COMMERCIAL

## 2018-05-18 VITALS
HEIGHT: 69 IN | BODY MASS INDEX: 23.61 KG/M2 | SYSTOLIC BLOOD PRESSURE: 110 MMHG | RESPIRATION RATE: 18 BRPM | TEMPERATURE: 98.2 F | WEIGHT: 159.4 LBS | DIASTOLIC BLOOD PRESSURE: 60 MMHG | HEART RATE: 66 BPM

## 2018-05-18 DIAGNOSIS — K57.92 ACUTE DIVERTICULITIS: Primary | ICD-10-CM

## 2018-05-18 PROCEDURE — 99213 OFFICE O/P EST LOW 20 MIN: CPT | Performed by: INTERNAL MEDICINE

## 2018-05-18 ASSESSMENT — PAIN SCALES - GENERAL: PAINLEVEL: NO PAIN (0)

## 2018-05-18 NOTE — PATIENT INSTRUCTIONS
Recommend follow-up colonoscopy in ~6-8 weeks.    Continue and complete the antibiotics.    Continue Miralax until completion of antibiotics. Can always extend if straining for bowel movements causes discomfort.    Can resume normal diet (which is already high fiber!) once feeling better.

## 2018-05-18 NOTE — MR AVS SNAPSHOT
After Visit Summary   5/18/2018    Melva Saleh    MRN: [id number]           Patient Information     Date Of Birth          1955        Visit Information        Provider Department      5/18/2018 10:30 AM Makayla Hawthorne MD [facility]        Today's Diagnoses     Acute diverticulitis    -  1      Care Instructions    Recommend follow-up colonoscopy in ~6-8 weeks.    Continue and complete the antibiotics.    Continue Miralax until completion of antibiotics. Can always extend if straining for bowel movements causes discomfort.    Can resume normal diet (which is already high fiber!) once feeling better.           Follow-ups after your visit        Additional Services     GASTROENTEROLOGY ADULT REF PROCEDURE ONLY Flory Shayy [phone] (please schedule for 6-8 weeks out (patient with acute diverticulitis as of now  (5/18/18)); (please schedule for 6-8 weeks out (patient with acute diverticulitis as of n...       Last Lab Result: Creatinine (mg/dL)       Date                     Value                 05/13/2018               0.67             ----------  Body mass index is 23.54 kg/(m^2).      Patient will be contacted to schedule procedure.     Please be aware that coverage of these services is subject to the terms and limitations of your health insurance plan.  Call member services at your health plan with any benefit or coverage questions.  Any procedures must be performed at a [facility] facility OR coordinated by your clinic's referral office.    Please bring the following with you to your appointment:    (1) Any X-Rays, CTs or MRIs which have been performed.  Contact the facility where they were done to arrange for  prior to your scheduled appointment.    (2) List of current medications   (3) This referral request   (4) Any documents/labs given to you for this referral                  Who to contact     If you have questions or need follow up  "information about today's clinic visit or your schedule please contact [facility] directly at [phone].  Normal or non-critical lab and imaging results will be communicated to you by [employer], letter or phone within 4 business days after the clinic has received the results. If you do not hear from us within 7 days, please contact the clinic through [employer] or phone. If you have a critical or abnormal lab result, we will notify you by phone as soon as possible.  Submit refill requests through [employer] or call your pharmacy and they will forward the refill request to us. Please allow 3 business days for your refill to be completed.          Additional Information About Your Visit        [employer] Information     [employer] gives you secure access to your electronic health record. If you see a primary care provider, you can also send messages to your care team and make appointments. If you have questions, please call your primary care clinic.  If you do not have a primary care provider, please call [phone] and they will assist you.        Care EveryWhere ID     This is your Care EveryWhere ID. This could be used by other organizations to access your [facility] medical records  [id number]        Your Vitals Were     Pulse Temperature Respirations Height Last Period BMI (Body Mass Index)    66 98.2  F (36.8  C) (Oral) 18 5' 9\" (1.753 m) (LMP Unknown) 23.54 kg/m2       Blood Pressure from Last 3 Encounters:   05/18/18 110/60   05/13/18 121/70   04/25/18 115/79    Weight from Last 3 Encounters:   05/18/18 159 lb 6.4 oz (72.3 kg)   05/13/18 156 lb (70.8 kg)   01/02/18 162 lb (73.5 kg)              We Performed the Following     GASTROENTEROLOGY ADULT REF PROCEDURE ONLY Flory Lucas [phone] (please schedule for 6-8 weeks out (patient with acute diverticulitis as of now  (5/18/18)); (please schedule for 6-8 weeks out (patient with acute diverticulitis as of n...        Primary Care " Provider Office Phone # Fax #    Makayla Hawthorne [phone]       [address] [facility] [address]        Equal Access to Services     ALVIN MERAZ : Haddelores david tariq roger Samano, waaxda luqadaha, qaybta kaalmada toma, keysha bender laCarmencitakaylene benites. So [facility] [phone].    ATENCIÓN: Si habla español, tiene a roach disposición servicios gratuitos de asistencia lingüística. Llame al [phone].    We comply with applicable federal civil rights laws and [place] laws. We do not discriminate on the basis of race, color, national origin, age, disability, sex, sexual orientation, or gender identity.            Thank you!     Thank you for choosing [facility]  for your care. Our goal is always to provide you with excellent care. Hearing back from our patients is one way we can continue to improve our services. Please take a few minutes to complete the written survey that you may receive in the mail after your visit with us. Thank you!             Your Updated Medication List - Protect others around you: Learn how to safely use, store and throw away your medicines at www.disposemymeds.org.          This list is accurate as of 5/18/18 11:04 AM.  Always use your most recent med list.                   Brand Name Dispense Instructions for use Diagnosis    ciprofloxacin 500 MG tablet    CIPRO    20 tablet    Take 1 tablet (500 mg) by mouth 2 times daily for 10 days        clobetasol 0.05 % ointment    TEMOVATE    45 g    Apply sparingly to affected area twice daily prn. After two weeks take 1 wk break, then repeat prn.  Do not apply to face.    Dyshidrotic hand dermatitis       metroNIDAZOLE 500 MG tablet    FLAGYL    30 tablet    Take 1 tablet (500 mg) by mouth 3 times daily for 10 days        pravastatin 20 MG tablet    PRAVACHOL    90 tablet    Take 1 tablet (20 mg) by mouth daily    Hyperlipidemia with target LDL less than 130       triamcinolone 0.1 % cream     KENALOG    15 g    Apply sparingly to affected area three times daily for 14 days.    Irritant contact dermatitis, unspecified trigger

## 2018-05-18 NOTE — PROGRESS NOTES
"  SUBJECTIVE:                                                      HPI: Melva Saleh is a pleasant 63 year old female who presents for ER follow-up:    Presented to the ER earlier this week with right lower quadrant abdominal pain.  CT abdomen and pelvis demonstrated acute diverticulitis mid to distal sigmoid colon, with no associated abscess or free intraperitoneal air. Patient had a leukocytosis of 16.6, with no left shift. Labs otherwise within normal limits.  Patient was discharged on courses of Cipro, Flagyl, and Miralax.  Patient instructed to adhere to a low residue diet.    Overall, patient is feeling better. She continues to have mild right lower quadrant abdominal pain with bowel movements, but is otherwise pain-free.  Her bowel movements are daily and soft. No nausea or vomiting. No fevers or chills. She is fatigued, but a little less so each day.    Last colonoscopy in 2009 demonstrated sigmoid diverticulosis.  Repeat colonoscopy recommended in 10 years.    The medication, allergy, and problem lists have been reviewed and updated as appropriate.       OBJECTIVE:                                                      /60 (BP Location: Left arm, Patient Position: Chair, Cuff Size: Adult Regular)  Pulse 66  Temp 98.2  F (36.8  C) (Oral)  Resp 18  Ht 5' 9\" (1.753 m)  Wt 159 lb 6.4 oz (72.3 kg)  LMP  (LMP Unknown)  BMI 23.54 kg/m2  Constitutional: well-appearing  Respiratory: normal respiratory effort; clear to auscultation bilaterally  Cardiovascular: regular rate and rhythm; no edema  Gastrointestinal: soft, non-distended, and bowel sounds present; mild right lower quadrant tenderness to palpation, no rebound;no organomegaly or masses   Musculoskeletal: normal gait and station  Psych: normal judgment and insight; normal mood and affect; recent and remote memory intact      ASSESSMENT/PLAN:                                                      (K57.92) Acute diverticulitis  (primary encounter " diagnosis)  Plan:    - continue and complete course of antibiotics.   - continue MiraLAX until pain-free when having bowel movements.   - may resume normal high-fiber diet once pain-free; continue low residue diet until then.   - follow-up colonoscopy in 6-8 weeks (no sooner).    The instructions on the AVS were discussed and explained to the patient. Patient expressed understanding of instructions.    (Chart documentation was completed, in part, with Disrupt6 voice-recognition software. Even though reviewed, some grammatical, spelling, and word errors may remain.)    Makayla Hawthorne MD   [address] [address]  T: [phone], F: [phone]

## 2018-07-26 ENCOUNTER — HOSPITAL ENCOUNTER (OUTPATIENT)
Facility: CLINIC | Age: 63
End: 2018-07-26
Attending: INTERNAL MEDICINE | Admitting: INTERNAL MEDICINE
Payer: COMMERCIAL

## 2018-10-01 ENCOUNTER — HOSPITAL ENCOUNTER (OUTPATIENT)
Facility: CLINIC | Age: 63
Discharge: HOME | End: 2018-10-01
Attending: COLON & RECTAL SURGERY | Admitting: COLON & RECTAL SURGERY
Payer: COMMERCIAL

## 2018-10-01 ENCOUNTER — SURGERY (OUTPATIENT)
Age: 63
End: 2018-10-01

## 2018-10-01 VITALS
RESPIRATION RATE: 32 BRPM | SYSTOLIC BLOOD PRESSURE: 116 MMHG | HEIGHT: 69 IN | WEIGHT: 150 LBS | DIASTOLIC BLOOD PRESSURE: 80 MMHG | OXYGEN SATURATION: 97 % | BODY MASS INDEX: 22.22 KG/M2

## 2018-10-01 LAB — COLONOSCOPY: NORMAL

## 2018-10-01 PROCEDURE — G0500 MOD SEDAT ENDO SERVICE >5YRS: HCPCS | Performed by: COLON & RECTAL SURGERY

## 2018-10-01 PROCEDURE — G0121 COLON CA SCRN NOT HI RSK IND: HCPCS | Performed by: COLON & RECTAL SURGERY

## 2018-10-01 PROCEDURE — 25000128 H RX IP 250 OP 636: Performed by: COLON & RECTAL SURGERY

## 2018-10-01 PROCEDURE — 45378 DIAGNOSTIC COLONOSCOPY: CPT | Performed by: COLON & RECTAL SURGERY

## 2018-10-01 RX ORDER — LIDOCAINE 40 MG/G
CREAM TOPICAL
Status: DISCONTINUED | OUTPATIENT
Start: 2018-10-01 | End: 2018-10-01 | Stop reason: HOSPADM

## 2018-10-01 RX ORDER — FENTANYL CITRATE 50 UG/ML
INJECTION, SOLUTION INTRAMUSCULAR; INTRAVENOUS PRN
Status: DISCONTINUED | OUTPATIENT
Start: 2018-10-01 | End: 2018-10-01 | Stop reason: HOSPADM

## 2018-10-01 RX ORDER — ONDANSETRON 2 MG/ML
4 INJECTION INTRAMUSCULAR; INTRAVENOUS
Status: DISCONTINUED | OUTPATIENT
Start: 2018-10-01 | End: 2018-10-01 | Stop reason: HOSPADM

## 2018-10-01 RX ADMIN — FENTANYL CITRATE 100 MCG: 50 INJECTION, SOLUTION INTRAMUSCULAR; INTRAVENOUS at 11:52

## 2018-10-01 RX ADMIN — MIDAZOLAM 2 MG: 1 INJECTION INTRAMUSCULAR; INTRAVENOUS at 11:50

## 2018-10-01 RX ADMIN — MIDAZOLAM 1 MG: 1 INJECTION INTRAMUSCULAR; INTRAVENOUS at 11:57

## 2018-10-01 NOTE — H&P
Colon & Rectal Surgery History and Physical  Pre-Endoscopy Procedure Note    History of Present Illness   I have been asked by Dr. Hawthorne to evaluate this 63 year old female for colorectal cancer screening. She had a normal screening colonoscopy in January 2009. She currently denies any abdominal pain, weight loss, bleeding per rectum, or recent change in bowel habits.    Past Medical History  Diagnosis Date     Diverticulitis      Hyperlipidemia        Past Surgical History  Procedure Laterality Date     COLONOSCOPY       TONSILLECTOMY & ADENOIDECTOMY  as a child        Medications  Medication Sig     clobetasol (TEMOVATE) 0.05 % ointment Apply sparingly to affected area twice daily prn. After two weeks take 1 wk break, then repeat prn.  Do not apply to face.     triamcinolone (KENALOG) 0.1 % cream Apply sparingly to affected area three times daily for 14 days.     pravastatin (PRAVACHOL) 20 MG tablet Take 1 tablet (20 mg) by mouth daily       Allergies  Allergen Reactions     Augmentin Difficulty breathing     Erythromycin Nausea     Lactose      Penicillins Other (See Comments) and Hives     angioedema        Family History   Family history includes Breast Cancer in her maternal aunt and paternal grandmother; Coronary Artery Disease in her father; Myocardial Infarction (age of onset: 66) in her paternal grandfather.     Social History   .  She reports that she has never smoked. She has never used smokeless tobacco. She reports that she drinks alcohol. She reports that she does not use illicit drugs.    Review of Systems   Constitutional:  No fever, weight change or fatigue.    Eyes:     No dry eyes or vision changes.   Ears/Nose/Throat/Neck:  No oral ulcers, sore throat or voice change.    Cardiovascular:   No palpitations, syncope, angina or edema.   Respiratory:    No chest pain, excessive sleepiness, shortness of breath or hemoptysis.    Gastrointestinal:   No abdominal pain, nausea, vomiting, diarrhea or  "heartburn.    Genitourinary:   No dysuria, hematuria, urinary retention or urinary frequency.   Musculoskeletal:  No joint swelling or arthralgias.    Dermatologic:  No skin rash or other skin changes.   Neurologic:    No focal weakness or numbness. No neuropathy.   Psychiatric:    No depression, anxiety, suicidal ideation, or paranoid ideation.   Endocrine:   No cold or heat intolerance, polydipsia, hirsutism, change in libido, or flushing.   Hematology/Lymphatic:  No bleeding or lymphadenopathy.    Allergy/Immunology:  No rhinitis or hives.     Physical Exam   Vitals:  /79, RR 16, HR 64, height 1.753 m (5' 9\"), weight 68 kg (150 lb), SpO2 100 %, not currently breastfeeding.    General:  Alert and oriented to person, place and time   Airway: Normal oropharyngeal airway and neck mobility   Lungs:  Clear bilaterally   Heart:  Regular rate and rhythm   Abdomen: Soft, NT, ND, no masses   Rectal:  Perianal skin without excoriation, hemorrhoidal disease or anal fissure        Digital rectal examination reveals normal sphincter tone without masses    ASA Grade: II (mild systemic disease)    Impression: Cleared for use of conscious sedation for colorectal cancer screening    Plan: Proceed with colonoscopy     Kendra Olson MD  [facility]  [phone]  "

## 2018-10-08 ENCOUNTER — RADIANT APPOINTMENT (OUTPATIENT)
Dept: MAMMOGRAPHY | Facility: CLINIC | Age: 63
End: 2018-10-08
Attending: INTERNAL MEDICINE
Payer: COMMERCIAL

## 2018-10-08 ENCOUNTER — OFFICE VISIT (OUTPATIENT)
Dept: INTERNAL MEDICINE | Facility: CLINIC | Age: 63
End: 2018-10-08
Payer: COMMERCIAL

## 2018-10-08 VITALS
HEART RATE: 59 BPM | WEIGHT: 151.2 LBS | OXYGEN SATURATION: 99 % | TEMPERATURE: 98 F | BODY MASS INDEX: 22.33 KG/M2 | SYSTOLIC BLOOD PRESSURE: 114 MMHG | DIASTOLIC BLOOD PRESSURE: 80 MMHG | RESPIRATION RATE: 14 BRPM

## 2018-10-08 DIAGNOSIS — Z13.1 SCREENING FOR DIABETES MELLITUS: ICD-10-CM

## 2018-10-08 DIAGNOSIS — Z00.00 ROUTINE HISTORY AND PHYSICAL EXAMINATION OF ADULT: Primary | ICD-10-CM

## 2018-10-08 DIAGNOSIS — Z12.31 VISIT FOR SCREENING MAMMOGRAM: ICD-10-CM

## 2018-10-08 DIAGNOSIS — L29.9 EAR ITCHING: ICD-10-CM

## 2018-10-08 DIAGNOSIS — Z13.220 SCREENING FOR CHOLESTEROL LEVEL: ICD-10-CM

## 2018-10-08 DIAGNOSIS — Z23 NEED FOR VACCINATION: ICD-10-CM

## 2018-10-08 DIAGNOSIS — Z11.4 SCREENING FOR HUMAN IMMUNODEFICIENCY VIRUS WITHOUT PRESENCE OF RISK FACTORS: ICD-10-CM

## 2018-10-08 DIAGNOSIS — Z23 NEED FOR PROPHYLACTIC VACCINATION AND INOCULATION AGAINST INFLUENZA: ICD-10-CM

## 2018-10-08 LAB
ALBUMIN SERPL-MCNC: 4.1 G/DL (ref 3.4–5)
ALP SERPL-CCNC: 82 U/L (ref 40–150)
ALT SERPL W P-5'-P-CCNC: 31 U/L (ref 0–50)
ANION GAP SERPL CALCULATED.3IONS-SCNC: 3 MMOL/L (ref 3–14)
AST SERPL W P-5'-P-CCNC: 26 U/L (ref 0–45)
BILIRUB SERPL-MCNC: 0.6 MG/DL (ref 0.2–1.3)
BUN SERPL-MCNC: 15 MG/DL (ref 7–30)
CALCIUM SERPL-MCNC: 9.2 MG/DL (ref 8.5–10.1)
CHLORIDE SERPL-SCNC: 106 MMOL/L (ref 94–109)
CHOLEST SERPL-MCNC: 212 MG/DL
CO2 SERPL-SCNC: 32 MMOL/L (ref 20–32)
CREAT SERPL-MCNC: 0.65 MG/DL (ref 0.52–1.04)
GFR SERPL CREATININE-BSD FRML MDRD: >90 ML/MIN/1.7M2
GLUCOSE SERPL-MCNC: 94 MG/DL (ref 70–99)
HDLC SERPL-MCNC: 53 MG/DL
LDLC SERPL CALC-MCNC: 141 MG/DL
NONHDLC SERPL-MCNC: 159 MG/DL
POTASSIUM SERPL-SCNC: 4.1 MMOL/L (ref 3.4–5.3)
PROT SERPL-MCNC: 7.4 G/DL (ref 6.8–8.8)
SODIUM SERPL-SCNC: 141 MMOL/L (ref 133–144)
TRIGL SERPL-MCNC: 92 MG/DL

## 2018-10-08 PROCEDURE — 90471 IMMUNIZATION ADMIN: CPT | Performed by: INTERNAL MEDICINE

## 2018-10-08 PROCEDURE — 90715 TDAP VACCINE 7 YRS/> IM: CPT | Performed by: INTERNAL MEDICINE

## 2018-10-08 PROCEDURE — 80053 COMPREHEN METABOLIC PANEL: CPT | Performed by: INTERNAL MEDICINE

## 2018-10-08 PROCEDURE — 90472 IMMUNIZATION ADMIN EACH ADD: CPT | Performed by: INTERNAL MEDICINE

## 2018-10-08 PROCEDURE — 99396 PREV VISIT EST AGE 40-64: CPT | Mod: 25 | Performed by: INTERNAL MEDICINE

## 2018-10-08 PROCEDURE — 77067 SCR MAMMO BI INCL CAD: CPT | Mod: TC

## 2018-10-08 PROCEDURE — 36415 COLL VENOUS BLD VENIPUNCTURE: CPT | Performed by: INTERNAL MEDICINE

## 2018-10-08 PROCEDURE — 80061 LIPID PANEL: CPT | Performed by: INTERNAL MEDICINE

## 2018-10-08 PROCEDURE — 87389 HIV-1 AG W/HIV-1&-2 AB AG IA: CPT | Performed by: INTERNAL MEDICINE

## 2018-10-08 PROCEDURE — 90682 RIV4 VACC RECOMBINANT DNA IM: CPT | Performed by: INTERNAL MEDICINE

## 2018-10-08 RX ORDER — NEOMYCIN SULFATE, POLYMYXIN B SULFATE AND HYDROCORTISONE 10; 3.5; 1 MG/ML; MG/ML; [USP'U]/ML
4 SUSPENSION/ DROPS AURICULAR (OTIC) 4 TIMES DAILY
Qty: 10 ML | Refills: 3 | Status: SHIPPED | OUTPATIENT
Start: 2018-10-08 | End: 2019-07-22

## 2018-10-08 NOTE — MR AVS SNAPSHOT
After Visit Summary   10/8/2018    Melva Saleh    MRN: [id number]           Patient Information     Date Of Birth          1955        Visit Information        Provider Department      10/8/2018 8:15 AM Makayla Hawthorne MD [facility]        Today's Diagnoses     Screening for cholesterol level    -  1    Need for vaccination        Need for prophylactic vaccination and inoculation against influenza        Screening for diabetes mellitus        Screening for human immunodeficiency virus without presence of risk factors          Care Instructions    Flu shot and tetanus booster today.    Shingrix series at your convenience at any pharmacy.    Fasting labs today.           Follow-ups after your visit        Who to contact     If you have questions or need follow up information about today's clinic visit or your schedule please contact [facility] directly at [phone].  Normal or non-critical lab and imaging results will be communicated to you by MyChart, letter or phone within 4 business days after the clinic has received the results. If you do not hear from us within 7 days, please contact the clinic through MyChart or phone. If you have a critical or abnormal lab result, we will notify you by phone as soon as possible.  Submit refill requests through [employer] or call your pharmacy and they will forward the refill request to us. Please allow 3 business days for your refill to be completed.          Additional Information About Your Visit        MyChart Information     [employer] gives you secure access to your electronic health record. If you see a primary care provider, you can also send messages to your care team and make appointments. If you have questions, please call your primary care clinic.  If you do not have a primary care provider, please call [phone] and they will assist you.        Care EveryWhere ID     This is your Care EveryWhere  ID. This could be used by other organizations to access your [facility] [place] medical records  [id number]        Your Vitals Were     Pulse Temperature Respirations Last Period Pulse Oximetry BMI (Body Mass Index)    59 98  F (36.7  C) (Oral) 14 (LMP Unknown) 99% 22.33 kg/m2       Blood Pressure from Last 3 Encounters:   10/08/18 114/80   10/01/18 116/80   05/18/18 110/60    Weight from Last 3 Encounters:   10/08/18 151 lb 3.2 oz (68.6 kg)   10/01/18 150 lb (68 kg)   05/18/18 159 lb 6.4 oz (72.3 kg)              We Performed the Following     1st  Administration  [17875]     Comprehensive metabolic panel     Each additional admin.  (Right click and add QUANTITY)  [63063]     FLU VACCINE, (RIV4) RECOMBINANT HA  , IM (FluBlok, egg free) [04310]- >18 YRS (FMG recommended  50-64 YRS)     HIV Antigen Antibody Combo     Lipid Profile     TDAP VACCINE (ADACEL) [43811.002]        Primary Care Provider Office Phone # Fax #    Makayla Hawthorne [phone]       [address] [facility] [address]        Equal Access to Services     ALVIN MERAZ : Hadii david ku hadasho Soomaali, waaxda luqadaha, qaybta kaalmada adeegyada, keysha benites. So [facility] [phone].    ATENCIÓN: Si habla español, tiene a roach disposición servicios gratuitos de asistencia lingüística. Llame al [phone].    We comply with applicable federal civil rights laws and [place] laws. We do not discriminate on the basis of race, color, national origin, age, disability, sex, sexual orientation, or gender identity.            Thank you!     Thank you for choosing [facility]  for your care. Our goal is always to provide you with excellent care. Hearing back from our patients is one way we can continue to improve our services. Please take a few minutes to complete the written survey that you may receive in the mail after your visit with us. Thank you!             Your Updated Medication List -  Protect others around you: Learn how to safely use, store and throw away your medicines at www.disposemymeds.org.          This list is accurate as of 10/8/18  8:46 AM.  Always use your most recent med list.                   Brand Name Dispense Instructions for use Diagnosis    clobetasol 0.05 % ointment    TEMOVATE    45 g    Apply sparingly to affected area twice daily prn. After two weeks take 1 wk break, then repeat prn.  Do not apply to face.    Dyshidrotic hand dermatitis

## 2018-10-08 NOTE — PATIENT INSTRUCTIONS
Flu shot and tetanus booster today.    Shingrix series at your convenience at any pharmacy.    Fasting labs today.

## 2018-10-08 NOTE — PROGRESS NOTES
SUBJECTIVE:                                                      HPI: Melva Saleh is a pleasant 63 year old female who presents for a physical.    Complains of intermittent ear itching - deep in the canals.     ROS:  Constitutional: denies unintentional weight loss or gain; denies fevers, chills, or sweats     Cardiovascular: denies chest pain, palpitations, or edema  Respiratory: denies cough, wheezing, shortness of breath, or dyspnea on exertion  Gastrointestinal: denies nausea, vomiting, constipation, diarrhea, or abdominal pain  Genitourinary: denies urinary frequency, urgency, dysuria, or hematuria  Integumentary: denies rash or pruritus  Musculoskeletal: denies back pain, muscle pain, joint pain, or joint swelling  Neurologic: denies focal weakness, numbness, or tingling  Hematologic/Immunologic: denies history of anemia or blood transfusions  Endocrine: denies heat or cold intolerance; denies polyuria, polydipsia  Psychiatric: denies anxiety; see preventative health below    Past Medical History:   Diagnosis Date     NO ACTIVE PROBLEMS      Past Surgical History:   Procedure Laterality Date     COLONOSCOPY N/A 10/1/2018    Procedure: COLONOSCOPY;  colonoscopy;  Surgeon: Kendra Olson MD;  Location:  GI     TONSILLECTOMY & ADENOIDECTOMY  as a child     Family History   Problem Relation Age of Onset     Coronary Artery Disease Father      s/p PCI (stent) later in life     Myocardial Infarction Paternal Grandfather      later in life     Breast Cancer Maternal Aunt      Breast Cancer Paternal Grandmother      Type 2 Diabetes Paternal Aunt      Cerebrovascular Disease No family hx of      Ovarian Cancer No family hx of      Colon Cancer No family hx of      Occupational History     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    3 adult children - live in area.    4 grandchildren (as of 2018).    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.    No formal exercise currently.      Allergies   Allergen Reactions     Augmentin Difficulty breathing     Erythromycin Nausea     Lactose      Penicillins Hives     Current Outpatient Prescriptions   Medication Sig     clobetasol (TEMOVATE) 0.05 % ointment Apply sparingly to affected area twice daily prn. After two weeks take 1 wk break, then repeat prn.  Do not apply to face.     Immunization History   Administered Date(s) Administered     HEPA 12/30/2009, 05/06/2010, 11/09/2012     HepB 10/30/2009, 12/04/2009, 05/06/2010     Influenza (IIV3) PF 12/04/2009, 10/27/2010, 10/10/2012     Influenza Vaccine IM 3yrs+ 4 Valent IIV4 10/26/2013, 10/16/2014, 10/01/2015, 11/16/2017     Influenza Vaccine, 3 YRS +, IM (QUADRIVALENT W/PRESERVATIVES) 10/27/2016     Meningococcal (Menactra ) 12/22/2015     Meningococcal (Menomune ) 10/30/2009     Poliovirus, inactivated (IPV) 11/09/2012     TD (ADULT, 7+) 06/22/1998     TDAP Vaccine (Adacel) 08/19/2008     Tdap (Adacel,Boostrix) 08/19/2008     Typhoid Oral 12/22/2015     Zoster vaccine, live 10/27/2016     OBJECTIVE:                                                      /80  Pulse 59  Temp 98  F (36.7  C) (Oral)  Resp 14  Wt 151 lb 3.2 oz (68.6 kg)  LMP  (LMP Unknown)  SpO2 99%  BMI 22.33 kg/m2  Constitutional: well-appearing  Eyes: normal conjunctivae and lids; pupils equal, round, and reactive to light  Ears, Nose, Mouth, and Throat: normal ears and nose; tympanic membranes visualized and normal; normal lips, teeth, and gums; no oropharyngeal lesions or ulcers  Neck: supple and symmetric; no lymphadenopathy; no thyromegaly or masses  Respiratory: normal respiratory effort; clear to auscultation bilaterally  Cardiovascular: regular rate and rhythm; pedal pulses palpable;  no edema  Breasts: normal appearance; no masses or skin retraction; no nipple discharge or bleeding; no axillary lymphadenopathy  Gastrointestinal: soft, non-tender, non-distended, and bowel sounds present; no organomegaly or masses  Musculoskeletal: normal gait and station  Psych: normal judgment and insight; normal mood and affect; recent and remote memory intact; oriented to time, place, and person    PREVENTATIVE HEALTH                                                      BMI: within normal limits   Blood pressure: within normal limits   Breast CA screening: up to date   Cervical CA screening: up to date   Colon CA screening: up to date   Lung CA screening: n/a   Dexa: up to date   Screening HCV: completed  Screening HIV: DUE  Screening cholesterol: DUE  Screening diabetes: DUE  STD testing: no risk factors present  Depression screening: PHQ-2 assessment completed and reviewed - no intervention indicated at this time  Alcohol misuse screening: alcohol use reviewed - no intervention indicated at this time  Immunizations: reviewed; flu shot, tetanus booster, and Shingrix series DUE - will get Shingrix series at a later date    ASSESSMENT/PLAN:                                                       (Z00.00) Routine history and physical examination of adult  (primary encounter diagnosis)  Comment: PMH, PSH, FH, SH, medications, allergies, immunizations, and preventative health measures reviewed.   Plan: see below for plans.     (Z23) Need for vaccination  Plan: TDAP given today.    (Z23) Need for prophylactic vaccination and inoculation against influenza  Plan: flu shot given today.     (Z13.220) Screening for cholesterol level  (Z13.1) Screening for diabetes mellitus  (Z11.4) Screening for human immunodeficiency virus without presence of risk factors  Plan: fasting labs today.     (L29.9) Ear itching  Comment: normal exam.   Plan: cortisporin ear drops as needed.     The instructions on the AVS were discussed and  explained to the patient. Patient expressed understanding of instructions.    (Chart documentation was completed, in part, with [employer] voice-recognition software. Even though reviewed, some grammatical, spelling, and word errors may remain.)    Makayla Hawthorne MD   [address] [address]  T: [phone], F: [phone]    c

## 2018-10-09 LAB — HIV 1+2 AB+HIV1 P24 AG SERPL QL IA: NONREACTIVE

## 2019-07-22 ENCOUNTER — TELEPHONE (OUTPATIENT)
Dept: INTERNAL MEDICINE | Facility: CLINIC | Age: 64
End: 2019-07-22

## 2019-07-22 ENCOUNTER — OFFICE VISIT (OUTPATIENT)
Dept: INTERNAL MEDICINE | Facility: CLINIC | Age: 64
End: 2019-07-22
Payer: COMMERCIAL

## 2019-07-22 VITALS
OXYGEN SATURATION: 95 % | SYSTOLIC BLOOD PRESSURE: 124 MMHG | DIASTOLIC BLOOD PRESSURE: 80 MMHG | HEART RATE: 79 BPM | WEIGHT: 151.9 LBS | RESPIRATION RATE: 16 BRPM | BODY MASS INDEX: 22.43 KG/M2

## 2019-07-22 DIAGNOSIS — F41.1 GAD (GENERALIZED ANXIETY DISORDER): Primary | ICD-10-CM

## 2019-07-22 PROCEDURE — 99213 OFFICE O/P EST LOW 20 MIN: CPT | Performed by: INTERNAL MEDICINE

## 2019-07-22 NOTE — TELEPHONE ENCOUNTER
Dr. Hawthorne,     OV notes from today 7/22:      START Zoloft - half tab daily x 6-8 days then a full tab daily.    RX PENDING.

## 2019-07-22 NOTE — TELEPHONE ENCOUNTER
Reason for Call:  Medication or medication refill:    Do you use a [facility]?  Name of the pharmacy and phone number for the current request:  [facility] [place], [place] tele# [phone]    Name of the medication requested: zoloft    Other request: pt saw MD today, was at pharmacy and no prescription      Can we leave a detailed message on this number? YES    Phone number patient can be reached at: Home number on file [phone] (home)    Best Time: asap    Call taken on 7/22/2019 at 4:26 PM by Kriss Arita

## 2019-07-22 NOTE — PROGRESS NOTES
"  SUBJECTIVE:                                                      HPI: Melva Saleh is a pleasant 64 year old female who presents with anxiety:    Patient suspects she has had anxiety her whole life, but only realized that it was present/not normal several years ago. Anxiety worsened after her episode of diverticulitis last year. Patient reports that her anxiety is generalized/always present and describes it as \"feeling on edge.\" Anxiety is worst first thing in the morning when she wakes up - feels it in her gut.    No difficulty sleeping.  No changes in appetite.  No significant impact on daily activities or interactions.    Patient has never been diagnosed or treated for anxiety.    Patient's mother and sister have anxiety. Mother is being treated with an unknown medication. Patient is unsure if her sister is being treated.    The medication, allergy, and problem lists have been reviewed and updated as appropriate.     OBJECTIVE:                                                      /80   Pulse 79   Resp 16   Wt 68.9 kg (151 lb 14.4 oz)   LMP  (LMP Unknown)   SpO2 95%   BMI 22.43 kg/m    Constitutional: well-appearing  Psych: normal judgment and insight; normal mood and affect; recent and remote memory intact    ASSESSMENT/PLAN:                                                      (F41.1) BALBIR (generalized anxiety disorder)  (primary encounter diagnosis)  Comment: new diagnosis.  Plan:    - referred for counseling - patient will be contacted to schedule.   - START Zoloft 50 mg daily.   - follow-up in 6-8 weeks (earlier as needed).     The instructions on the AVS were discussed and explained to the patient. Patient expressed understanding of instructions.    (Chart documentation was completed, in part, with [employer] voice-recognition software. Even though reviewed, some grammatical, spelling, and word errors may remain.)    Makayla Hawthorne MD   [facility]  [address]   [place] [address]  T: [phone], F: [phone]

## 2019-07-22 NOTE — PATIENT INSTRUCTIONS
You will be contacted to schedule counseling.    START Zoloft - half tab daily x 6-8 days then a full tab daily.    Follow-up in ~6-8 weeks.

## 2019-07-24 ENCOUNTER — TELEPHONE (OUTPATIENT)
Dept: INTERNAL MEDICINE | Facility: CLINIC | Age: 64
End: 2019-07-24

## 2019-07-24 NOTE — TELEPHONE ENCOUNTER
Pt wondering if it is ok to take tylenol for her headaches and melatonin for sleep.   Pt advised ok to take tylenol and melatonin, will speak with pharmacy if she has questions or will send a Mychart to PCP  Audrey Price RN

## 2019-08-16 NOTE — TELEPHONE ENCOUNTER
Patient does not know if she has the flu or not but she is experiencing body aches.Advised on home treatment of sxs and if no improvement or if sxs increase she should be seen. Patient will comply.   [Normal] : supple, no neck mass and thyroid not enlarged [Normal Neck Lymph Nodes] : normal neck lymph nodes  [Normal Supraclavicular Lymph Nodes] : normal supraclavicular lymph nodes [Normal Groin Lymph Nodes] : normal groin lymph nodes [Normal Axillary Lymph Nodes] : normal axillary lymph nodes [Normal] : oriented to person, place and time, with appropriate affect [de-identified] : 2-2.5cm smooth well circumscribed mobile soft tissue mass left breast 1:00. US shows either fibroadenoma or lipoma in area of palpable concern. No other masses or axillary adenopathy bilaterally.

## 2019-09-29 ENCOUNTER — HEALTH MAINTENANCE LETTER (OUTPATIENT)
Age: 64
End: 2019-09-29

## 2019-10-04 ENCOUNTER — IMMUNIZATION (OUTPATIENT)
Dept: NURSING | Facility: CLINIC | Age: 64
End: 2019-10-04
Payer: COMMERCIAL

## 2019-10-04 VITALS
SYSTOLIC BLOOD PRESSURE: 124 MMHG | HEART RATE: 59 BPM | RESPIRATION RATE: 14 BRPM | DIASTOLIC BLOOD PRESSURE: 82 MMHG | WEIGHT: 151 LBS | OXYGEN SATURATION: 95 % | BODY MASS INDEX: 22.3 KG/M2

## 2019-10-04 DIAGNOSIS — Z23 NEED FOR PROPHYLACTIC VACCINATION AND INOCULATION AGAINST INFLUENZA: Primary | ICD-10-CM

## 2019-12-09 ENCOUNTER — HEALTH MAINTENANCE LETTER (OUTPATIENT)
Age: 64
End: 2019-12-09

## 2019-12-20 ENCOUNTER — OFFICE VISIT (OUTPATIENT)
Dept: PSYCHOLOGY | Facility: CLINIC | Age: 64
End: 2019-12-20
Payer: COMMERCIAL

## 2019-12-20 DIAGNOSIS — F41.1 GAD (GENERALIZED ANXIETY DISORDER): Primary | ICD-10-CM

## 2019-12-20 PROCEDURE — 90791 PSYCH DIAGNOSTIC EVALUATION: CPT | Performed by: SOCIAL WORKER
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
## 2019-12-23 ASSESSMENT — PATIENT HEALTH QUESTIONNAIRE - PHQ9
5. POOR APPETITE OR OVEREATING: MORE THAN HALF THE DAYS
SUM OF ALL RESPONSES TO PHQ QUESTIONS 1-9: 5

## 2019-12-23 ASSESSMENT — ANXIETY QUESTIONNAIRES
6. BECOMING EASILY ANNOYED OR IRRITABLE: SEVERAL DAYS
1. FEELING NERVOUS, ANXIOUS, OR ON EDGE: MORE THAN HALF THE DAYS
3. WORRYING TOO MUCH ABOUT DIFFERENT THINGS: SEVERAL DAYS
5. BEING SO RESTLESS THAT IT IS HARD TO SIT STILL: NOT AT ALL
GAD7 TOTAL SCORE: 7
IF YOU CHECKED OFF ANY PROBLEMS ON THIS QUESTIONNAIRE, HOW DIFFICULT HAVE THESE PROBLEMS MADE IT FOR YOU TO DO YOUR WORK, TAKE CARE OF THINGS AT HOME, OR GET ALONG WITH OTHER PEOPLE: SOMEWHAT DIFFICULT
7. FEELING AFRAID AS IF SOMETHING AWFUL MIGHT HAPPEN: NOT AT ALL
2. NOT BEING ABLE TO STOP OR CONTROL WORRYING: SEVERAL DAYS

## 2019-12-24 ASSESSMENT — ANXIETY QUESTIONNAIRES: GAD7 TOTAL SCORE: 7

## 2020-01-17 DIAGNOSIS — F41.1 GAD (GENERALIZED ANXIETY DISORDER): ICD-10-CM

## 2020-01-17 NOTE — TELEPHONE ENCOUNTER
"Requested Prescriptions   Pending Prescriptions Disp Refills     sertraline (ZOLOFT) 50 MG tablet [Pharmacy Med Name: SERTRALINE HCL 50 MG TABLET] 90 tablet 1     Sig: TAKE 1 TABLET BY MOUTH EVERY DAY       SSRIs Protocol Passed - 1/17/2020  9:06 AM        Passed - Recent (12 mo) or future (30 days) visit within the authorizing provider's specialty     Patient has had an office visit with the authorizing provider or a provider within the authorizing providers department within the previous 12 mos or has a future within next 30 days. See \"Patient Info\" tab in inbasket, or \"Choose Columns\" in Meds & Orders section of the refill encounter.              Passed - Medication is active on med list        Passed - Patient is age 18 or older        Passed - No active pregnancy on record        Passed - No positive pregnancy test in last 12 months        Last Written Prescription Date:  7/22/19  Last Fill Quantity: 90,  # refills: 1   Last office visit: 7/22/2019 with prescribing provider:  7/22/19   Future Office Visit:   Next 5 appointments (look out 90 days)    Jan 23, 2020  3:30 PM CST  Return Visit with [facility] ([facility]) [address]  [facility] [id number] [phone]   Feb 06, 2020  3:30 PM CST  Return Visit with [facility] ([facility]) [address]  [facility] [address]  [phone]           "

## 2020-01-17 NOTE — LETTER
[facility]  [address] [id number] [phone]            Melva Saleh  [address]  [facility] [address]        January 17, 2020    Dear Melva,    While refilling your prescription today, we noticed that you are due for an appointment with your provider.  We will refill your prescription for 30 days, but a follow-up appointment must be made before any additional refills can be approved.     Taking care of your health is important to us and we look forward to seeing you in the near future.  Please call us at [phone] or [phone] (or use [employer]) to schedule an appointment.     Please disregard this notice if you have already made an appointment.    Sincerely,        [facility]

## 2020-01-17 NOTE — TELEPHONE ENCOUNTER
Prescription approved per [facility] Refill Protocol.    Keli KUMARN, RN, PHN

## 2020-01-23 ENCOUNTER — OFFICE VISIT (OUTPATIENT)
Dept: PSYCHOLOGY | Facility: CLINIC | Age: 65
End: 2020-01-23
Payer: COMMERCIAL

## 2020-01-23 DIAGNOSIS — F41.1 GAD (GENERALIZED ANXIETY DISORDER): Primary | ICD-10-CM

## 2020-01-23 PROCEDURE — 90834 PSYTX W PT 45 MINUTES: CPT | Performed by: SOCIAL WORKER

## 2020-01-23 ASSESSMENT — ANXIETY QUESTIONNAIRES
5. BEING SO RESTLESS THAT IT IS HARD TO SIT STILL: NOT AT ALL
1. FEELING NERVOUS, ANXIOUS, OR ON EDGE: MORE THAN HALF THE DAYS
2. NOT BEING ABLE TO STOP OR CONTROL WORRYING: SEVERAL DAYS
GAD7 TOTAL SCORE: 5
7. FEELING AFRAID AS IF SOMETHING AWFUL MIGHT HAPPEN: NOT AT ALL
3. WORRYING TOO MUCH ABOUT DIFFERENT THINGS: SEVERAL DAYS
6. BECOMING EASILY ANNOYED OR IRRITABLE: SEVERAL DAYS

## 2020-01-23 ASSESSMENT — PATIENT HEALTH QUESTIONNAIRE - PHQ9: 5. POOR APPETITE OR OVEREATING: NOT AT ALL

## 2020-01-23 NOTE — PATIENT INSTRUCTIONS
Treatment Plan    Patient's Name: Melva Saleh  YOB: 1955    Date: 1/23/2020    DSM5 Diagnoses: 300.02 (F41.1) Generalized Anxiety Disorder  Psychosocial / Contextual Factors: Client reports experiencing anxiety and urges to pick at her hands  WHODAS: see intake    Referral / Collaboration:  Referral to another professional/service is not indicated at this time..    Anticipated number of session or this episode of care: 5-8      MeasurableTreatment Goal(s) related to diagnosis / functional impairment(s)  Goal 1: Patient will reduce symptoms of anxiety as evidenced by BALBIR-7 remaining under 5 for the next three months and client reported reduced hand picking.     I will know I've met my goal when I'm not picking at my hands.      Objective #A (Patient Action)    Patient will Improve concentration, focus, and mindfulness in daily activities .  Status: New - Date: 1/23/2020     Intervention(s)  Therapist will teach mindfulness skills to be present and self sooth.    Objective #B  Patient will identify a replacement behavior that is soothing.  Status: New - Date: 1/23/2020     Intervention(s)  Therapist will teach possible  behavioral replacements and how to tell if they are working.      Patient has reviewed and agreed to the above plan.      Mana DUTTA, LGSW January 23, 2020

## 2020-01-23 NOTE — PROGRESS NOTES
Progress Note    Patient Name: Melva Saleh  Date: 1/23/2020         Service Type: Individual  Video Visit: No     Session Start Time: 3:30  Session End Time: 4:15     Session Length: 45 min    Session #: 2    Attendees: Client attended alone     Treatment Plan Last Reviewed: 1/23/2020  PHQ-9 / BALBIR-7 : 1/23/2020    DATA  Interactive Complexity: No  Crisis: No       Progress Since Last Session (Related to Symptoms / Goals / Homework):   Symptoms: Improving client reported reduced anxiety    Homework: Completed in session identified goals for therapy      Episode of Care Goals: Minimal progress - PREPARATION (Decided to change - considering how); Intervened by negotiating a change plan and determining options / strategies for behavior change, identifying triggers, exploring social supports, and working towards setting a date to begin behavior change     Current / Ongoing Stressors and Concerns:   Ongoing: Client identified ongoing symptoms of anxiety including worry and skin picking.   Current:  Client reported that she had not been experiencing much anxiety lately but that she has continued picking at skin when she is anxious, tired or absentminded. Reviewed goals client has for therapy and she identified wanting to address the skin picking habit.     Treatment Objective(s) Addressed in This Session:   identified goals for therapy       Intervention:   Motivational Interviewing: identified goals for therapy        ASSESSMENT: Current Emotional / Mental Status (status of significant symptoms):   Risk status (Self / Other harm or suicidal ideation)   Patient denies current fears or concerns for personal safety.   Patient denies current or recent suicidal ideation or behaviors.   Patientdenies current or recent homicidal ideation or behaviors.   Patient denies current or recent self injurious behavior or ideation.   Patient denies other safety concerns.   Patient reports there  has been no change in risk factors since their last session.     Patientreports there has been no change in protective factors since their last session.     Recommended that patient call 911 or go to the local ED should there be a change in any of these risk factors.     Appearance:   Appropriate    Eye Contact:   Good    Psychomotor Behavior: Normal    Attitude:   Cooperative    Orientation:   All   Speech    Rate / Production: Hyperverbal  Client was talkative    Volume:  Normal    Mood:    Anxious  Client appeared anxious discussing herself   Affect:    Appropriate    Thought Content:  Clear    Thought Form:  Coherent  Logical    Insight:    Good      Medication Review:   No changes to current psychiatric medication(s)     Medication Compliance:   Yes     Changes in Health Issues:   None reported     Chemical Use Review:   Substance Use: Chemical use reviewed, no active concerns identified      Tobacco Use: No current tobacco use.      Diagnosis:  1. BALBIR (generalized anxiety disorder)        Collateral Reports Completed:   Not Applicable    PLAN: (Patient Tasks / Therapist Tasks / Other)  Client will practice tracking her symptoms and return for therapy in two weeks.        Mana DUTTA, [facility] 1/23/2020  Note reviewed and clinical supervision by TRA [facility] 1/30/2020                                              ______________________________________________________________________    Treatment Plan    Patient's Name: Melva Saleh  YOB: 1955    Date: 1/23/2020    DSM5 Diagnoses: 300.02 (F41.1) Generalized Anxiety Disorder  Psychosocial / Contextual Factors: Client reports experiencing anxiety and urges to pick at her hands  WHODAS: see intake    Referral / Collaboration:  Referral to another professional/service is not indicated at this time..    Anticipated number of session or this episode of care: 5-8      MeasurableTreatment Goal(s) related to diagnosis / functional  impairment(s)  Goal 1: Patient will reduce symptoms of anxiety as evidenced by BALBIR-7 remaining under 5 for the next three months and client reported reduced hand picking.     I will know I've met my goal when I'm not picking at my hands.      Objective #A (Patient Action)    Patient will Improve concentration, focus, and mindfulness in daily activities .  Status: New - Date: 1/23/2020     Intervention(s)  Therapist will teach mindfulness skills to be present and self sooth.    Objective #B  Patient will identify a replacement behavior that is soothing.  Status: New - Date: 1/23/2020     Intervention(s)  Therapist will teach possible  behavioral replacements and how to tell if they are working.      Patient has reviewed and agreed to the above plan.      Mana DUTTA, LGSW January 23, 2020  Note reviewed and clinical supervision by TRA [facility] 1[facility]    good balance

## 2020-01-24 ASSESSMENT — ANXIETY QUESTIONNAIRES: GAD7 TOTAL SCORE: 5

## 2020-02-06 ENCOUNTER — OFFICE VISIT (OUTPATIENT)
Dept: PSYCHOLOGY | Facility: CLINIC | Age: 65
End: 2020-02-06
Payer: COMMERCIAL

## 2020-02-06 DIAGNOSIS — F41.1 GAD (GENERALIZED ANXIETY DISORDER): Primary | ICD-10-CM

## 2020-02-06 PROCEDURE — 90834 PSYTX W PT 45 MINUTES: CPT | Performed by: SOCIAL WORKER

## 2020-02-06 ASSESSMENT — ANXIETY QUESTIONNAIRES
2. NOT BEING ABLE TO STOP OR CONTROL WORRYING: MORE THAN HALF THE DAYS
7. FEELING AFRAID AS IF SOMETHING AWFUL MIGHT HAPPEN: NOT AT ALL
1. FEELING NERVOUS, ANXIOUS, OR ON EDGE: NEARLY EVERY DAY
5. BEING SO RESTLESS THAT IT IS HARD TO SIT STILL: MORE THAN HALF THE DAYS
GAD7 TOTAL SCORE: 9
3. WORRYING TOO MUCH ABOUT DIFFERENT THINGS: NOT AT ALL
6. BECOMING EASILY ANNOYED OR IRRITABLE: NOT AT ALL

## 2020-02-06 ASSESSMENT — PATIENT HEALTH QUESTIONNAIRE - PHQ9: 5. POOR APPETITE OR OVEREATING: MORE THAN HALF THE DAYS

## 2020-02-06 NOTE — PROGRESS NOTES
Progress Note    Patient Name: Melva Saleh  Date: 2/6/2020         Service Type: Individual  Video Visit: No     Session Start Time: 3:30  Session End Time: 4:15     Session Length: 45 min    Session #: 4    Attendees: Client attended alone     Treatment Plan Last Reviewed: 1/23/2020  PHQ-9 / BALBIR-7 : 2/6/2020    DATA  Interactive Complexity: No  Crisis: No       Progress Since Last Session (Related to Symptoms / Goals / Homework):   Symptoms: No change client reported mood had been steadyclient reported reduced anxiety    Homework: Achieved / completed to satisfaction client reported tracking her symptoms      Episode of Care Goals: Satisfactory progress - ACTION (Actively working towards change); Intervened by reinforcing change plan / affirming steps taken     Current / Ongoing Stressors and Concerns:   Ongoing: Client identified ongoing symptoms of anxiety including worry and skin picking.   Current:  Client reported that in tracking her symptoms she identified engaging in skin picking when she was sitting at her chair at work or in front of her computer at home. She stated that two others times she had been stressed about an event with work and the other she was mindlessly watching TV. Client said that the pattern was interesting to her as she hadn't realized that work or stress contributed to her picking. Explored replacement behaviors client could practice while resisting the urge to follow through with skin picking.        Treatment Objective(s) Addressed in This Session:   use soothing sensory skills to reduce anxiety  without follow through with urges       Intervention:   Discussed elimination process in reducing skin picking and how client can begin extinguishing her urge. Client identified that she does find tactile sensory skills to be soothing and that she would find an object to use in her hands when resisint skil picking    Motivational Interviewing    MI  Intervention: Expressed Empathy/Understanding, Supported Autonomy, Collaboration, Evocation, Open-ended questions, Change talk (evoked) and Reframe     Change Talk Expressed by the Patient: Desire to change Ability to change Committment to change Activation    Provider Response to Change Talk: E - Evoked more info from patient about behavior change, A - Affirmed patient's thoughts, decisions, or attempts at behavior change, R - Reflected patient's change talk and S - Summarized patient's change talk statements          ASSESSMENT: Current Emotional / Mental Status (status of significant symptoms):   Risk status (Self / Other harm or suicidal ideation)   Patient denies current fears or concerns for personal safety.   Patient denies current or recent suicidal ideation or behaviors.   Patientdenies current or recent homicidal ideation or behaviors.   Patient denies current or recent self injurious behavior or ideation.   Patient denies other safety concerns.   Patient reports there has been no change in risk factors since their last session.     Patientreports there has been no change in protective factors since their last session.     Recommended that patient call 911 or go to the local ED should there be a change in any of these risk factors.     Appearance:   Appropriate    Eye Contact:   Good    Psychomotor Behavior: Normal    Attitude:   Cooperative    Orientation:   All   Speech    Rate / Production: Hyperverbal  Client was talkative    Volume:  Normal    Mood:    Anxious  Client presented with anxiety considering resisting urge to skin pick   Affect:    Appropriate    Thought Content:  Clear    Thought Form:  Coherent  Logical    Insight:    Good      Medication Review:   No changes to current psychiatric medication(s)     Medication Compliance:   Yes     Changes in Health Issues:   None reported     Chemical Use Review:   Substance Use: Chemical use reviewed, no active concerns identified      Tobacco Use: No  current tobacco use.      Diagnosis:  1. BALBIR (generalized anxiety disorder)        Collateral Reports Completed:   Not Applicable    PLAN: (Patient Tasks / Therapist Tasks / Other)  Client will practice resisting skin picking, use soothing object to help with resistance and return for therapy in two weeks.        Mana DUTTA, SW 2/6/2020                                              Note reviewed and clinical supervision by TRA Gipson [facility] 2/21/2020 ______________________________________________________________________    Treatment Plan    Patient's Name: Melva Saleh  YOB: 1955    Date: 1/23/2020    DSM5 Diagnoses: 300.02 (F41.1) Generalized Anxiety Disorder  Psychosocial / Contextual Factors: Client reports experiencing anxiety and urges to pick at her hands  WHODAS: see intake    Referral / Collaboration:  Referral to another professional/service is not indicated at this time..    Anticipated number of session or this episode of care: 5-8      MeasurableTreatment Goal(s) related to diagnosis / functional impairment(s)  Goal 1: Patient will reduce symptoms of anxiety as evidenced by BALBIR-7 remaining under 5 for the next three months and client reported reduced hand picking.     I will know I've met my goal when I'm not picking at my hands.      Objective #A (Patient Action)    Patient will Improve concentration, focus, and mindfulness in daily activities .  Status: New - Date: 1/23/2020     Intervention(s)  Therapist will teach mindfulness skills to be present and self sooth.    Objective #B  Patient will identify a replacement behavior that is soothing.  Status: New - Date: 1/23/2020     Intervention(s)  Therapist will teach possible  behavioral replacements and how to tell if they are working.      Patient has reviewed and agreed to the above plan.      Mana DUTTA, LGSW January 23, 2020  Note reviewed and clinical supervision by TRA Gipson [facility] 1/30/2020

## 2020-02-07 ASSESSMENT — ANXIETY QUESTIONNAIRES: GAD7 TOTAL SCORE: 9

## 2020-02-20 ENCOUNTER — OFFICE VISIT (OUTPATIENT)
Dept: PSYCHOLOGY | Facility: CLINIC | Age: 65
End: 2020-02-20
Payer: COMMERCIAL

## 2020-02-20 DIAGNOSIS — F41.1 GAD (GENERALIZED ANXIETY DISORDER): Primary | ICD-10-CM

## 2020-02-20 PROCEDURE — 90834 PSYTX W PT 45 MINUTES: CPT | Performed by: SOCIAL WORKER

## 2020-02-20 ASSESSMENT — ANXIETY QUESTIONNAIRES
5. BEING SO RESTLESS THAT IT IS HARD TO SIT STILL: SEVERAL DAYS
7. FEELING AFRAID AS IF SOMETHING AWFUL MIGHT HAPPEN: NOT AT ALL
1. FEELING NERVOUS, ANXIOUS, OR ON EDGE: NEARLY EVERY DAY
2. NOT BEING ABLE TO STOP OR CONTROL WORRYING: SEVERAL DAYS
GAD7 TOTAL SCORE: 9
3. WORRYING TOO MUCH ABOUT DIFFERENT THINGS: SEVERAL DAYS
6. BECOMING EASILY ANNOYED OR IRRITABLE: MORE THAN HALF THE DAYS

## 2020-02-20 ASSESSMENT — PATIENT HEALTH QUESTIONNAIRE - PHQ9: 5. POOR APPETITE OR OVEREATING: SEVERAL DAYS

## 2020-02-20 NOTE — PROGRESS NOTES
"                                           Progress Note    Patient Name: Melva Saleh  Date: 2/20/2020         Service Type: Individual  Video Visit: No     Session Start Time: 9:00  Session End Time: 9:40     Session Length: 40 min    Session #: 5    Attendees: Client attended alone     Treatment Plan Last Reviewed: 1/23/2020  PHQ-9 / BALBIR-7 : 2/20/2020    DATA  Interactive Complexity: No  Crisis: No       Progress Since Last Session (Related to Symptoms / Goals / Homework):   Symptoms: Improving reduced skin picking    Homework: Achieved / completed to satisfaction client reported resisting skin picking by using behavioral replacement      Episode of Care Goals: Satisfactory progress - ACTION (Actively working towards change); Intervened by reinforcing change plan / affirming steps taken     Current / Ongoing Stressors and Concerns:   Ongoing: Client identified ongoing symptoms of anxiety including worry and skin picking.   Current:  Client reported that she bought a couple of \"squishes\" to have in her hands when resisting an urge to pick her skin. She stated that at first it made a huge difference, but then her hands got really dry and she regressed. Client identified realizing she had to groom her hands and cuticles to also reduce her picking. She reported that after putting on some special oil and softening her hands, and then using the squishes, she was better able to resist her urge to skin pick. Client explored how she used to do more maintenance with her hands but stopped, and how maintaining healthy hands may be preventative for her.       Treatment Objective(s) Addressed in This Session:   use soothing sensory skills to reduce anxiety  without follow through with urges       Intervention:   Identified how a combination of maintenance and behavioral replacement may be the most effective for the client to reduce skin picking. Explored maintenance routines client can use including getting a manicure without " polish, getting cuticle cream and other tools.    Motivational Interviewing    MI Intervention: Expressed Empathy/Understanding, Supported Autonomy, Collaboration, Evocation, Permission to raise concern or advise, Open-ended questions, Change talk (evoked) and Reframe     Change Talk Expressed by the Patient: Desire to change Ability to change Committment to change Activation Taking steps    Provider Response to Change Talk: E - Evoked more info from patient about behavior change, A - Affirmed patient's thoughts, decisions, or attempts at behavior change, R - Reflected patient's change talk and S - Summarized patient's change talk statements          ASSESSMENT: Current Emotional / Mental Status (status of significant symptoms):   Risk status (Self / Other harm or suicidal ideation)   Patient denies current fears or concerns for personal safety.   Patient denies current or recent suicidal ideation or behaviors.   Patientdenies current or recent homicidal ideation or behaviors.   Patient denies current or recent self injurious behavior or ideation.   Patient denies other safety concerns.   Patient reports there has been no change in risk factors since their last session.     Patientreports there has been a chance in protective factors since their last session.  reduced skin picking   Recommended that patient call 911 or go to the local ED should there be a change in any of these risk factors.     Appearance:   Appropriate    Eye Contact:   Good    Psychomotor Behavior: Normal    Attitude:   Cooperative    Orientation:   All   Speech    Rate / Production: Hyperverbal  Client was talkative    Volume:  Normal    Mood:    Anxious  Client appeared anxious describing recent skin picking   Affect:    Appropriate    Thought Content:  Clear    Thought Form:  Coherent  Logical    Insight:    Good      Medication Review:   No changes to current psychiatric medication(s)     Medication Compliance:   Yes     Changes in Health  Issues:   None reported     Chemical Use Review:   Substance Use: Chemical use reviewed, no active concerns identified      Tobacco Use: No current tobacco use.      Diagnosis:  1. BALBIR (generalized anxiety disorder)        Collateral Reports Completed:   Not Applicable    PLAN: (Patient Tasks / Therapist Tasks / Other)  Client will get a manicure without polish, create a maintenance routine that is low effort and return for therapy in two weeks.        Mana DUTTA, [facility] 2/20/2020                                              Note reviewed and clinical supervision by TRA Gipson [facility] 2/25/2020   ______________________________________________________________________    Treatment Plan    Patient's Name: Melva aSleh  YOB: 1955    Date: 1/23/2020    DSM5 Diagnoses: 300.02 (F41.1) Generalized Anxiety Disorder  Psychosocial / Contextual Factors: Client reports experiencing anxiety and urges to pick at her hands  WHODAS: see intake    Referral / Collaboration:  Referral to another professional/service is not indicated at this time..    Anticipated number of session or this episode of care: 5-8      MeasurableTreatment Goal(s) related to diagnosis / functional impairment(s)  Goal 1: Patient will reduce symptoms of anxiety as evidenced by BALBIR-7 remaining under 5 for the next three months and client reported reduced hand picking.     I will know I've met my goal when I'm not picking at my hands.      Objective #A (Patient Action)    Patient will Improve concentration, focus, and mindfulness in daily activities .  Status: New - Date: 1/23/2020     Intervention(s)  Therapist will teach mindfulness skills to be present and self sooth.    Objective #B  Patient will identify a replacement behavior that is soothing.  Status: New - Date: 1/23/2020     Intervention(s)  Therapist will teach possible  behavioral replacements and how to tell if they are working.      Patient has reviewed and agreed to the  above plan.      Mana DUTTA, LGSW January 23, 2020  Note reviewed and clinical supervision by TRA Gipson LICSW 1/30/2020

## 2020-02-21 ASSESSMENT — ANXIETY QUESTIONNAIRES: GAD7 TOTAL SCORE: 9

## 2020-03-12 ENCOUNTER — OFFICE VISIT (OUTPATIENT)
Dept: PSYCHOLOGY | Facility: CLINIC | Age: 65
End: 2020-03-12
Payer: COMMERCIAL

## 2020-03-12 DIAGNOSIS — F41.1 GAD (GENERALIZED ANXIETY DISORDER): Primary | ICD-10-CM

## 2020-03-12 PROCEDURE — 90834 PSYTX W PT 45 MINUTES: CPT | Performed by: SOCIAL WORKER

## 2020-03-12 ASSESSMENT — ANXIETY QUESTIONNAIRES
7. FEELING AFRAID AS IF SOMETHING AWFUL MIGHT HAPPEN: NOT AT ALL
6. BECOMING EASILY ANNOYED OR IRRITABLE: NOT AT ALL
5. BEING SO RESTLESS THAT IT IS HARD TO SIT STILL: SEVERAL DAYS
GAD7 TOTAL SCORE: 7
2. NOT BEING ABLE TO STOP OR CONTROL WORRYING: SEVERAL DAYS
3. WORRYING TOO MUCH ABOUT DIFFERENT THINGS: SEVERAL DAYS
1. FEELING NERVOUS, ANXIOUS, OR ON EDGE: MORE THAN HALF THE DAYS

## 2020-03-12 ASSESSMENT — PATIENT HEALTH QUESTIONNAIRE - PHQ9: 5. POOR APPETITE OR OVEREATING: MORE THAN HALF THE DAYS

## 2020-03-12 NOTE — PROGRESS NOTES
Progress Note    Patient Name: Melva Saleh  Date: 3/12/2020         Service Type: Individual  Video Visit: No     Session Start Time: 4:30  Session End Time: 5:15     Session Length: 45 min    Session #: 6    Attendees: Client attended alone     Treatment Plan Last Reviewed: 1/23/2020  PHQ-9 / BALBIR-7 : 3/12/2020    DATA  Interactive Complexity: No  Crisis: No       Progress Since Last Session (Related to Symptoms / Goals / Homework):   Symptoms: No change client reported symptoms are stable    Homework: Achieved / completed to satisfaction client reported creating a maintenance routine that is low effort      Episode of Care Goals: Satisfactory progress - ACTION (Actively working towards change); Intervened by reinforcing change plan / affirming steps taken     Current / Ongoing Stressors and Concerns:   Ongoing: Client identified ongoing symptoms of anxiety including worry and skin picking.   Current:  Client reported that she had been using a cuticle oil on her hands, taking care of her hands and continuing her mindfulness. She stated that for the most part she had been able to resist following through on her urges to pick at her fingers, expect for a particularly difficult day yesterday. She explained that she was feeling some pressure to complete a project within a deadline and that she had been picking her fingers without realizing it. Client reported feeling there is some link between work anxiety and increased skin picking. Explored how client can pause when feeling anxious, use paced breathing, reduce tension she feels in her body and then return to her task.       Treatment Objective(s) Addressed in This Session:   use soothing sensory skills to reduce anxiety  without follow through with urges       Intervention:   DBT: Reviewed how client was using behavioral techniques to reduce skin picking. Identified further soothing skills client can use to reduce general  anxiety and tension created by resisting skin picking.    Motivational Interviewing    MI Intervention: Expressed Empathy/Understanding, Supported Autonomy, Collaboration, Evocation, Permission to raise concern or advise, Open-ended questions, Change talk (evoked) and Reframe     Change Talk Expressed by the Patient: Desire to change Ability to change Committment to change Activation Taking steps    Provider Response to Change Talk: E - Evoked more info from patient about behavior change, A - Affirmed patient's thoughts, decisions, or attempts at behavior change, R - Reflected patient's change talk and S - Summarized patient's change talk statements          ASSESSMENT: Current Emotional / Mental Status (status of significant symptoms):   Risk status (Self / Other harm or suicidal ideation)   Patient denies current fears or concerns for personal safety.   Patient denies current or recent suicidal ideation or behaviors.   Patientdenies current or recent homicidal ideation or behaviors.   Patient denies current or recent self injurious behavior or ideation.   Patient denies other safety concerns.   Patient reports there has been no change in risk factors since their last session.     Patientreports there has been no change in protective factors since their last session.     Recommended that patient call 911 or go to the local ED should there be a change in any of these risk factors.     Appearance:   Appropriate    Eye Contact:   Good    Psychomotor Behavior: Normal    Attitude:   Cooperative    Orientation:   All   Speech    Rate / Production: Hyperverbal  Client was talkative    Volume:  Normal    Mood:    Anxious  Client appeared anxious describing recent skin picking   Affect:    Appropriate    Thought Content:  Clear    Thought Form:  Coherent  Logical    Insight:    Good      Medication Review:   No changes to current psychiatric medication(s)     Medication Compliance:   Yes     Changes in Health Issues:   None  reported     Chemical Use Review:   Substance Use: Chemical use reviewed, no active concerns identified      Tobacco Use: No current tobacco use.      Diagnosis:  1. BALBIR (generalized anxiety disorder)        Collateral Reports Completed:   Not Applicable    PLAN: (Patient Tasks / Therapist Tasks / Other)  Client will pause, practice a mindfulness skill, reassess anxiety level before returning to task and return for therapy in two weeks.        Mana DUTTA, [facility] 3/12/2020   Note reviewed and clinical supervision by TRA Gipson [facility] 3/18/2020                                               ______________________________________________________________________    Treatment Plan    Patient's Name: Melva Saleh  YOB: 1955    Date: 1/23/2020    DSM5 Diagnoses: 300.02 (F41.1) Generalized Anxiety Disorder  Psychosocial / Contextual Factors: Client reports experiencing anxiety and urges to pick at her hands  WHODAS: see intake    Referral / Collaboration:  Referral to another professional/service is not indicated at this time..    Anticipated number of session or this episode of care: 5-8      MeasurableTreatment Goal(s) related to diagnosis / functional impairment(s)  Goal 1: Patient will reduce symptoms of anxiety as evidenced by BALBIR-7 remaining under 5 for the next three months and client reported reduced hand picking.     I will know I've met my goal when I'm not picking at my hands.      Objective #A (Patient Action)    Patient will Improve concentration, focus, and mindfulness in daily activities .  Status: New - Date: 1/23/2020     Intervention(s)  Therapist will teach mindfulness skills to be present and self sooth.    Objective #B  Patient will identify a replacement behavior that is soothing.  Status: New - Date: 1/23/2020     Intervention(s)  Therapist will teach possible  behavioral replacements and how to tell if they are working.      Patient has reviewed and agreed to the above  plan.      Mana DUTTA, LGSW January 23, 2020  Note reviewed and clinical supervision by TRA Gipson LICSW 1/30/2020

## 2020-03-13 ASSESSMENT — ANXIETY QUESTIONNAIRES: GAD7 TOTAL SCORE: 7

## 2020-03-16 ENCOUNTER — OFFICE VISIT (OUTPATIENT)
Dept: URGENT CARE | Facility: URGENT CARE | Age: 65
End: 2020-03-16

## 2020-03-16 ENCOUNTER — VIRTUAL VISIT (OUTPATIENT)
Dept: FAMILY MEDICINE | Facility: OTHER | Age: 65
End: 2020-03-16

## 2020-03-16 DIAGNOSIS — R05.9 COUGH: Primary | ICD-10-CM

## 2020-03-16 PROCEDURE — U0002 COVID-19 LAB TEST NON-CDC: HCPCS | Mod: 90 | Performed by: PHYSICIAN ASSISTANT

## 2020-03-16 PROCEDURE — 99000 SPECIMEN HANDLING OFFICE-LAB: CPT | Performed by: PHYSICIAN ASSISTANT

## 2020-03-16 PROCEDURE — 99213 OFFICE O/P EST LOW 20 MIN: CPT | Performed by: PHYSICIAN ASSISTANT

## 2020-03-16 NOTE — PROGRESS NOTES
"Date: 2020 10:33:27  Clinician: Eda Alvarez  Clinician NPI: [id number]  Patient: Melva Saleh  Patient : 1955  Patient Address: [address], [place] [address]  Patient Phone: [phone]  Visit Protocol: URI  Patient Summary:  Melva is a 65 year old ( : 1955 ) female who initiated a Visit for COVID-19 (Coronavirus) evaluation and screening. When asked the question \"Please sign me up to receive news, health information and promotions from [employer].\", Melva responded \"No\".   A synchronous visit is necessary because the patient reported the following abnormal symptoms:   Recent facial or sinus surgery (requires clarification)   Melva states her symptoms started gradually 3-6 days ago.   Her symptoms consist of malaise, a headache, myalgia, a sore throat, and a cough.   Symptom details     Cough: Melva coughs every 5-10 minutes and her cough is not more bothersome at night. Phlegm comes into her throat when she coughs. She does not believe her cough is caused by post-nasal drip. The color of the phlegm is green.     Sore throat: Melva reports having mild throat pain (1-3 on a 10 point pain scale), does not have exudate on her tonsils, and can swallow liquids. The lymph nodes in her neck are not enlarged. A rash has not appeared on the skin since the sore throat started.     Headache: She states the headache is mild (1-3 on a 10 point pain scale).      Melva denies having ear pain, rhinitis, enlarged lymph nodes, facial pain or pressure, fever, chills, wheezing, nasal congestion, and teeth pain. She also denies double sickening (worsening symptoms after initial improvement) and taking antibiotic medication for the symptoms. She is not experiencing dyspnea.   Precipitating events  Within the past week, Melva has not been exposed to someone with strep throat. She has not recently been exposed to someone with influenza. Melva has been in close contact with the following high risk individuals: people " with asthma, heart disease or diabetes and adults 65 or older.   Pertinent COVID-19 (Coronavirus) information  Melva has traveled internationally or to the areas where COVID-19 (Coronavirus) is widespread in the last 14 days before the start of her symptoms. Countries or locations traveled as reported by the patient (free text): [place] from 2/26-3/6   Melva has not had close contact with a suspected or laboratory-confirmed COVID-19 patient within 14 days of symptom onset.   Melva is not a healthcare worker and does not work in a healthcare facility.   Pertinent medical history  Melva does not get yeast infections when she takes antibiotics.   Melva needs a return to work/school note.   Weight: 150 lbs   Melva does not smoke or use smokeless tobacco.   Additional information as reported by the patient (free text): Throat congestion &amp; a little sore throat with headache. No fever. I wish to help with care of grandchildren in light of school closures. I will not do it while I have these viral symptoms. However, I would like to know if it would be prudent to be tested for COVID19, in light of the symptoms, before further contact with family, grandchildren, co-workers, etc.   Weight: 150 lbs    MEDICATIONS: sertraline oral, ALLERGIES: Penicillins, erythromycin base, amoxicillin  Clinician Response:  Dear Melva,   Based on the information you have provided, it is recommended that you go to one of our designated Coronavirus (Covid-19) testing centers to get a test done from your car.To do this follow these instructions:  You should go to one of our dedicated testing centers as soon as able during the hours below at one of these locations:    Walk-in Care: [facility] at [address], [place] [address]. Hours: M-F 7am - 6pm, Sat-Sun 8am -- 3pm   [facility] at [address] [address]. Hours: Every Day 9am -- 7pm  Walk-in Care: [facility] at [address]  [place] [address]. Hours: M-F 7am - 6pm, Sat-Sun 8am -- 3pm  [facility] [address], [place] [address]. Hours: M-F 11am -- 7pm, Sat-Sun 9am-4pm  [facility] ([facility])[address], [place] [address].Hours: M-F 730-5     What to expect:    When you arrive please come park in the parking lot.  Be prepared to present photo ID  Call [phone] (For [facility] location call [phone]) and let them know which clinic you are at, the description of your car and where you are parked. Mention you did an [facility] visit and were sent for testing.  On that phone call you will give them the information to register your for the visit.  They will add you to the queue to get your test (you will stay in your car the entire time).  You will then be met by a provider who will perform a brief assessment in your car and collect samples to test for coronavirus and possibly influenza or RSV.From now until your test results return, please follow self-isolation practices:Isolate Yourself:     Isolate yourself while traveling.  Do Not allow any visitors within 6 feet.  Do Not go to work or school.  Do Not go to [employer],  centers, shopping, or other public places.  Do Not shake hands.  Avoid close contact with others (hugging, kissing).Protect Others:     Cover Your Mouth and Nose with a mask, disposable tissue or wash cloth to avoid spreading germs to others.  Wash your hands and face frequently with soap and water      Fever Medicines:    For fever relief, take acetaminophen or ibuprofen.  Treat fevers above 101deg F (38.3deg C) to lower fevers and make you more comfortable.   Acetaminophen (e.g., Tylenol): Take 650 mg (two 325 mg pills) by mouth every 4-6 hours as needed of regular strength Tylenol or 1,000 mg (two 500 mg pills) every 8 hours as needed of Extra Strength Tylenol.   Ibuprofen (e.g., Motrin, Advil): Take 400 mg (two 200 mg pills) by mouth  every 6 hours as needed.   Acetaminophen is thought to be safer than ibuprofen or naproxen for people over 65 years old. Acetaminophen is in many OTC and prescription medicines. It might be in more than one medicine that you are taking. You need to be careful and not take an overdose. Before taking any medicine, read all the instructions on the package.  Caution -NSAIDs (e.g., ibuprofen, naproxen): Do not take nonsteroidal anti-inflammatory drugs (NSAIDs) if you have stomach problems, kidney disease, heart failure, or other contraindications to using this type of medicine. Do not take NSAID medicines for over 7 days without consulting your PCP. Do not take NSAID medicines if you are pregnant. Do not take NSAID medicines if you are also taking blood thinners.    Call Back If: Breathing difficulty develops or you become worse.  Thank you for limiting contact with others, wearing a simple mask to cover your cough, practice good hand hygiene habits and accessing our virtual services where possible to limit the spread of this virus.     For more information about COVID19 and options for caring for yourself at home, please visit the CDC website at https://www.cdc.gov/coronavirus/2019-ncov/about/steps-when-sick.html  For more options for care at [facility], please visit our website at [email or web address]    Diagnosis: Other malaise  Diagnosis ICD: R53.81  Triage Notes: I reviewed the patient's history, verified their identity, and explained the Visit process.    Recent retinal surgery  qualifies d/t age  Synchronous Triage: phone, status: completed, duration: 340 seconds

## 2020-03-16 NOTE — PATIENT INSTRUCTIONS
You are being tested for Corona virus and possibly Influenza and/or RSV.     Please use the information at the end of this document to sign up for [facility] [employer] where you can get your results and a message about those results sent to you through the [employer] application. If you do not have mychart we will call you with your results but it may take longer.    Isolate Yourself:    Isolate yourself while traveling.    Do Not allow any visitors within 6 feet.    Do Not go to work or school.    Do Not go to [employer],  centers, shopping, or other public places.    Do Not shake hands.    Avoid close contact with others (hugging, kissing).    Protect Others:    Cover Your Mouth and Nose with a mask, disposable tissue or wash cloth to avoid spreading germs to others.    Wash your hands and face frequently with soap and water    Call Back If: Breathing difficulty develops or you become worse.    For more information about COVID19 and options for caring for yourself at home, please visit the CDC website at https://www.cdc.gov/coronavirus/2019-ncov/about/steps-when-sick.html  For more options for care at [facility], please visit our website at [email or web address]

## 2020-03-16 NOTE — PROGRESS NOTES
SUBJECTIVE:  This 65 year old female presents for COVID-19 testing.  she reports malaise, headache, sore throat and cough.  Onset of symptoms was 3-6 days.  Exposure history: travel to [place]    OBJECTIVE:  Visual assessment shows:  GENERAL APPEARANCE is healthy without evident apparent respiratory distress  BREATHING: the patient is breathing comfortably and is speaking full sentences    ASSESSMENT/PLAN:    ICD-10-CM    1. Cough  R05 Novel COVID-19 (Coronavirus) SARS-CoV-2 by PCR Nasopharyngeal swab        You are being tested for Corona Virus 19, Influenza and possibly RSV.    Please use the information at the end of this document to sign up for [facility] [employer] where you can get your results and a message about those results sent to you through the [employer] application. If you do not have [employer] we will call you with your results but it may take longer.    Isolate Yourself:    Isolate yourself while traveling.    Do Not allow any visitors within 6 feet.    Do Not go to work or school.    Do Not go to [employer],  centers, shopping, or other public places.    Do Not shake hands.    Avoid close contact with others (hugging, kissing).    Protect Others:    Cover Your Mouth and Nose with a mask, disposable tissue or wash cloth to avoid spreading germs to others.    Wash your hands and face frequently with soap and water    Call Back If: Breathing difficulty develops or you become worse.    For more information about COVID19 and options for caring for yourself at home, please visit the CDC website at https://www.cdc.gov/coronavirus/2019-ncov/about/steps-when-sick.html  For more options for care at [facility], please visit our website at [email or web address]    Milady Bal PA-C on 3/16/2020 at 3:09 PM

## 2020-03-21 LAB
SARS-COV-2 RNA SPEC QL NAA+PROBE: NOT DETECTED
SPECIMEN SOURCE: NORMAL

## 2020-05-19 DIAGNOSIS — F41.1 GAD (GENERALIZED ANXIETY DISORDER): ICD-10-CM

## 2020-06-09 ENCOUNTER — OFFICE VISIT (OUTPATIENT)
Dept: INTERNAL MEDICINE | Facility: CLINIC | Age: 65
End: 2020-06-09
Payer: COMMERCIAL

## 2020-06-09 VITALS
SYSTOLIC BLOOD PRESSURE: 98 MMHG | HEART RATE: 73 BPM | OXYGEN SATURATION: 97 % | WEIGHT: 156.5 LBS | BODY MASS INDEX: 23.18 KG/M2 | HEIGHT: 69 IN | DIASTOLIC BLOOD PRESSURE: 68 MMHG | RESPIRATION RATE: 15 BRPM

## 2020-06-09 DIAGNOSIS — Z01.818 PREOPERATIVE EXAMINATION: Primary | ICD-10-CM

## 2020-06-09 DIAGNOSIS — H26.491 OTHER SECONDARY CATARACT OF RIGHT EYE: ICD-10-CM

## 2020-06-09 PROCEDURE — 99214 OFFICE O/P EST MOD 30 MIN: CPT | Performed by: INTERNAL MEDICINE

## 2020-06-09 ASSESSMENT — MIFFLIN-ST. JEOR: SCORE: 1324.26

## 2020-06-09 NOTE — PROGRESS NOTES
SUBJECTIVE:                                                      HPI: Melva Saleh is a pleasant 65 year old female who presents for preoperative evaluation.    Surgeon: Elaina  Date: 6/23/2020  Location: [facility] [place], Fax#[phone]  Surgery: RIGHT cataract removal (low risk)  Indication: RIGHT cataract (s/p retinal reattachment in [facility], 2020)    Past Medical History:   Diagnosis Date     BALBIR (generalized anxiety disorder)      Personal or family history of excessive or prolonged bleeding? No  Personal or family history of blood clots? No  History of snoring/Sleep Apnea? Snores occasionally, but no history of EVER  History of blood transfusions? No    Clinical Predictors of Increased Risk: none    Past Surgical History:   Procedure Laterality Date     COLONOSCOPY N/A 10/1/2018    Procedure: COLONOSCOPY;  colonoscopy;  Surgeon: Kendra Olson MD;  Location:  GI     RETINAL REATTACHMENT Right 2020     TONSILLECTOMY & ADENOIDECTOMY  as a child     Artificial assistive devices, such as pacemakers, artificial cardiac valves, or artificial joints? No    Allergies   Allergen Reactions     Augmentin Difficulty breathing     Erythromycin Nausea     Penicillins Hives     Personal or family history of allergy to anesthesia? No  Allergy to local or topical analgesics? No  Allergy to latex? No  Allergy to adhesives/skin preparations (chlorhexadine)? No    Current Outpatient Medications   Medication Sig     sertraline (ZOLOFT) 50 MG tablet Take 1 tablet (50 mg) by mouth daily     Over the counter medications? No  Vitamin Supplements? No  Herbal Supplements? No    Family History   Problem Relation Age of Onset     Coronary Artery Disease Father         s/p PCI (stent) later in life     Myocardial Infarction Paternal Grandfather         later in life     Breast Cancer Maternal Aunt      Breast Cancer Paternal Grandmother      Diabetes Type 2  Paternal Aunt      Cerebrovascular Disease No family hx  "of      Ovarian Cancer No family hx of      Colon Cancer No family hx of      Social History     Occupational History     Occupation: Ministry staff at [employer]   Tobacco Use     Smoking status: Never Smoker     Smokeless tobacco: Never Used   Substance and Sexual Activity     Alcohol use: Yes     Comment: very rare     Drug use: No     Sexual activity: Yes     Partners: Male   Social History Narrative    .    3 adult children - live in area.    6 grandchildren (as of 2020).    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    No formal exercise currently.      Functional capacity: Can do heavy work around the house such as scrubbing floors or lifting or moving heavy furniture (4-10 METs)    OBJECTIVE:                                                      BP 98/68   Pulse 73   Resp 15   Ht 1.753 m (5' 9\")   Wt 71 kg (156 lb 8 oz)   LMP  (LMP Unknown)   SpO2 97%   BMI 23.11 kg/m    Constitutional: well-appearing  Eyes: normal conjunctivae and lids; pupils equal, round, and reactive to light  Ears, Nose, Mouth, and Throat: normal ears and nose; tympanic membranes visualized and normal; normal lips, teeth, and gums; no oropharyngeal lesions or ulcers  Neck: supple and symmetric; no lymphadenopathy; no thyromegaly or masses  Respiratory: normal respiratory effort; clear to auscultation bilaterally  Cardiovascular: regular rate and rhythm; pedal pulses palpable; no edema  Gastrointestinal: soft, non-tender, non-distended, and bowel sounds present; no organomegaly or masses  Musculoskeletal: normal gait and station  Psych: normal judgment and insight; normal mood and affect; recent and remote memory intact; oriented to time, place, and person    ASSESSMENT/PLAN:                                                      Melva Saleh is a pleasant 65 year old female who presents for a preoperative evaluation. She is at low clinical risk for a low risk procedure.    (Z01.818) Preoperative " examination  (primary encounter diagnosis)  (H26.491) Other secondary cataract of right eye  Plan: no additional work-up indicated at this time; no medication changes ashlyn-operatively.     RECOMMEND PROCEEDING WITH SURGERY: YES.    Thank you for referring Melva Saleh to me for consultation and allowing me to participate in her care.    The instructions on the AVS were discussed and explained to the patient. Patient expressed understanding of instructions.    (Chart documentation was completed, in part, with [employer] voice-recognition software. Even though reviewed, some grammatical, spelling, and word errors may remain.)    Makayla Hawthorne MD   [address] [address]  T: [phone], F: [phone]

## 2020-08-16 DIAGNOSIS — F41.1 GAD (GENERALIZED ANXIETY DISORDER): ICD-10-CM

## 2020-10-13 ENCOUNTER — ALLIED HEALTH/NURSE VISIT (OUTPATIENT)
Dept: NURSING | Facility: CLINIC | Age: 65
End: 2020-10-13
Payer: COMMERCIAL

## 2020-10-13 DIAGNOSIS — Z23 NEED FOR IMMUNIZATION AGAINST INFLUENZA: Primary | ICD-10-CM

## 2020-10-13 PROCEDURE — 90682 RIV4 VACC RECOMBINANT DNA IM: CPT

## 2020-10-13 PROCEDURE — 90471 IMMUNIZATION ADMIN: CPT

## 2020-11-10 ENCOUNTER — VIRTUAL VISIT (OUTPATIENT)
Dept: FAMILY MEDICINE | Facility: OTHER | Age: 65
End: 2020-11-10

## 2020-11-10 NOTE — PROGRESS NOTES
"Date: 11/10/2020 16:27:59  Clinician: Saul Bryson  Clinician NPI: [id number]  Patient: Melva Saleh  Patient : 1955  Patient Address: [address], [place] [address]  Patient Phone: [phone]  Visit Protocol: URI  Patient Summary:  Melva is a 65 year old ( : 1955 ) female who initiated a [facility] Visit for COVID-19 (Coronavirus) evaluation and screening. When asked the question \"Please sign me up to receive news, health information and promotions from [facility].\", Melva responded \"Yes\".    Melva states her symptoms started 1-2 days ago.   Her symptoms consist of facial pain or pressure, myalgia, malaise, a sore throat, a headache, a cough, and nasal congestion.   Symptom details     Nasal secretions: The color of her mucus is clear.    Cough: Melva coughs a few times an hour and her cough is not more bothersome at night. Phlegm does not come into her throat when she coughs. She does not believe her cough is caused by post-nasal drip.     Sore throat: Melva reports having mild throat pain (1-3 on a 10 point pain scale), does not have exudate on her tonsils, and can swallow liquids. She is not sure if the lymph nodes in her neck are enlarged. A rash has not appeared on the skin since the sore throat started.     Facial pain or pressure: The facial pain or pressure does not feel worse when bending or leaning forward.     Headache: She states the headache is moderate (4-6 on a 10 point pain scale).      Melva denies having vomiting, rhinitis, chills, teeth pain, ageusia, diarrhea, ear pain, wheezing, fever, nausea, and anosmia. She also denies taking antibiotic medication in the past month and having recent facial or sinus surgery in the past 60 days. She is not experiencing dyspnea.   Precipitating events  Within the past week, Melva has not been exposed to someone with strep throat. She has not recently been exposed to someone with influenza. Melva has been in close contact with the following high risk " individuals: adults 65 or older, children under the age of 5, and people with asthma, heart disease or diabetes.   Pertinent COVID-19 (Coronavirus) information  Melva does not work or volunteer as healthcare worker or a . In the past 14 days, Melva has not worked or volunteered at a healthcare facility or group living setting.   In the past 14 days, she also has not lived in a congregate living setting.   Melva has had a close contact with a laboratory-confirmed COVID-19 patient within 14 days of symptom onset. She was not exposed at her work. Date Melva was exposed to the laboratory-confirmed COVID-19 patient: 11/05/2020   Additional information about contact with COVID-19 (Coronavirus) patient as reported by the patient (free text): I visited my elderly father in [place], [place]. Today he tested positive, and has been hospitalized. My brother also has symptoms like mine. He lives in [place].  Dry cough, headache, body ache. Both our symptoms started Monday, Nov. 9.    Since December 2019, Melva has been tested for COVID-19 and has had upper respiratory infection (URI) or influenza-like illness.      Result of COVID-19 test: Negative     Date of her COVID-19 test: 06/19/2020     Date(s) of previous URI or influenza-like illness (free-text): Oct. 6-8     Symptoms Melva experienced during previous URI or influenza-like illness as reported by the patient (free-text): Runny nose, nasal congestion, little sore throat, a little achy.        Pertinent medical history  Melva does not get yeast infections when she takes antibiotics.   Melva needs a return to work/school note.   Weight: 150 lbs   Melva does not smoke or use smokeless tobacco.   Additional information as reported by the patient (free text): I need to know if I must quarantine. 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  Is a quick test possible, so that I may know in time for Wednesday programming?   Weight: 150 lbs     MEDICATIONS: sertraline oral, ALLERGIES: amoxicillin, erythromycin base, Penicillins  Clinician Response:  Dear Melva,   Your symptoms show that you may have coronavirus (COVID-19). This illness can cause fever, cough and trouble breathing. Many people get a mild case and get better on their own. Some people can get very sick.  What should I do?  We would like to test you for this virus.   1. Please call [phone] to schedule your visit. Explain that you were referred by [facility] to have a COVID-19 test. Be ready to share your [facility] visit ID number.  * If you need to schedule in [facility] please call [phone] or for [employer] [place] employees please call [phone].  * If you need to schedule in the [place] area please call [phone]. [employer] employees call [phone].  The following will serve as your written order for this COVID Test, ordered by me, for the indication of suspected COVID [Z20.828]: The test will be ordered in [employer], our electronic health record, after you are scheduled. It will show as ordered and authorized by Keshawn Oneill MD.  Order: COVID-19 (Coronavirus) PCR for SYMPTOMATIC testing from [facility].   2. When it's time for your COVID test:  Stay at least 6 feet away from others. (If someone will drive you to your test, stay in the backseat, as far away from the  as you can.)   Cover your mouth and nose with a mask, tissue or washcloth.  Go straight to the testing site. Don't make any stops on the way there or back.      3.Starting now: Stay home and away from others (self-isolate) until:   You've had no fever---and no medicine that reduces fever---for one full day (24 hours). And...   Your other symptoms have gotten better. For example, your cough or breathing has improved. And...   At least 10 days have passed since your symptoms started.       During this time, don't leave the house except for testing or medical care.   Stay in your own room, even for meals. Use your own bathroom if you  "can.   Stay away from others in your home. No hugging, kissing or shaking hands. No visitors.  Don't go to work, school or anywhere else.    Clean \"high touch\" surfaces often (doorknobs, counters, handles, etc.). Use a household cleaning spray or wipes. You'll find a full list of  on the EPA website: www.epa.gov/pesticide-registration/list-n-disinfectants-use-against-sars-cov-2.   Cover your mouth and nose with a mask, tissue or washcloth to avoid spreading germs.  Wash your hands and face often. Use soap and water.  Caregivers in these groups are at risk for severe illness due to COVID-19:  o People 65 years and older  o People who live in a nursing home or long-term care facility  o People with chronic disease (lung, heart, cancer, diabetes, kidney, liver, immunologic)  o People who have a weakened immune system, including those who:   Are in cancer treatment  Take medicine that weakens the immune system, such as corticosteroids  Had a bone marrow or organ transplant  Have an immune deficiency  Have poorly controlled HIV or AIDS  Are obese (body mass index of 40 or higher)  Smoke regularly   o Caregivers should wear gloves while washing dishes, handling laundry and cleaning bedrooms and bathrooms.  o Use caution when washing and drying laundry: Don't shake dirty laundry, and use the warmest water setting that you can.  o For more tips, go to www.cdc.gov/coronavirus/2019-ncov/downloads/10Things.pdf.    4.Sign up for [employer]. We know it's scary to hear that you might have COVID-19. We want to track your symptoms to make sure you're okay over the next 2 weeks. Please look for an email from [employer]---this is a free, online program that we'll use to keep in touch. To sign up, follow the link in the email. Learn more at [email or web address]  How can I take care of myself?   Get lots of rest. Drink extra fluids (unless a doctor has told you not to).   Take Tylenol (acetaminophen) for fever " or pain. If you have liver or kidney problems, ask your family doctor if it's okay to take Tylenol.   Adults can take either:    650 mg (two 325 mg pills) every 4 to 6 hours, or...   1,000 mg (two 500 mg pills) every 8 hours as needed.    Note: Don't take more than 3,000 mg in one day. Acetaminophen is found in many medicines (both prescribed and over-the-counter medicines). Read all labels to be sure you don't take too much.   For children, check the Tylenol bottle for the right dose. The dose is based on the child's age or weight.    If you have other health problems (like cancer, heart failure, an organ transplant or severe kidney disease): Call your specialty clinic if you don't feel better in the next 2 days.       Know when to call 911. Emergency warning signs include:    Trouble breathing or shortness of breath Pain or pressure in the chest that doesn't go away Feeling confused like you haven't felt before, or not being able to wake up Bluish-colored lips or face.  Where can I get more information?   [facility] -- About COVID-19: [email or web address]   CDC -- What to Do If You're Sick: www.cdc.gov/coronavirus/2019-ncov/about/steps-when-sick.html   [facility] -- Ending Home Isolation: www.cdc.gov/coronavirus/2019-ncov/hcp/disposition-in-home-patients.html   CDC -- Caring for Someone: www.cdc.gov/coronavirus/2019-ncov/if-you-are-sick/care-for-someone.html   [facility] -- Interim Guidance for Hospital Discharge to Home: www.health.[facility].[place].us/diseases/coronavirus/hcp/hospdischarge.pdf   [facility] clinical trials (COVID-19 research studies): clinicalaffairs.[facility]/umn-clinical-trials    Below are the COVID-19 hotlines at the [place] Department of Health ([facility]). Interpreters are available.    For health questions: Call 692-614-0989 or 1-912.198.4714 (7 a.m. to 7 p.m.) For questions about schools and childcare: Call 559-329-4992 or 1-531.586.8912 (7 a.m. to 7 p.m.)    Diagnosis: Contact with  and (suspected) exposure to other viral communicable diseases  Diagnosis ICD: Z20.828  Additional Clinician Notes:   If your symptoms are not improving or worsen, please go to one of our urgent care locations for evaluation and possible lab work.

## 2021-01-14 ENCOUNTER — HEALTH MAINTENANCE LETTER (OUTPATIENT)
Age: 66
End: 2021-01-14

## 2021-02-19 ENCOUNTER — NURSE TRIAGE (OUTPATIENT)
Dept: INTERNAL MEDICINE | Facility: CLINIC | Age: 66
End: 2021-02-19

## 2021-02-19 ENCOUNTER — VIRTUAL VISIT (OUTPATIENT)
Dept: INTERNAL MEDICINE | Facility: CLINIC | Age: 66
End: 2021-02-19
Payer: COMMERCIAL

## 2021-02-19 DIAGNOSIS — H60.543 ACUTE ECZEMATOID OTITIS EXTERNA OF BOTH EARS: Primary | ICD-10-CM

## 2021-02-19 PROCEDURE — 99213 OFFICE O/P EST LOW 20 MIN: CPT | Mod: GT | Performed by: INTERNAL MEDICINE

## 2021-02-19 RX ORDER — ACETIC ACID 20.65 MG/ML
4 SOLUTION AURICULAR (OTIC) 3 TIMES DAILY
Qty: 15 ML | Refills: 1 | Status: SHIPPED | OUTPATIENT
Start: 2021-02-19 | End: 2021-11-10

## 2021-02-19 NOTE — TELEPHONE ENCOUNTER
"    Additional Information    Negative: Pink or red swelling behind the ear    Negative: Stiff neck (can't touch chin to chest)    Negative: Patient sounds very sick or weak to the triager    Negative: Diabetes mellitus or a weak immune system (e.g., HIV positive, cancer chemotherapy, transplant patient)    Negative: Outer ear (earlobe) is red and/or swollen    Negative: Fever > 100.5 F (38.1 C)    Negative: Severe earache pain    Negative: Yellow or green discharge from ear canal    Negative: Decreased hearing (or another adult says that the ear canal is completely blocked with discharge)    Negative: Swollen lymph node near ear    Negative: Patient wants to be seen    Diagnosis is uncertain    Answer Assessment - Initial Assessment Questions  1. LOCATION: \"Which ear is involved?\"       Both ears. Left ear is worse  2. SYMPTOMS: \"What are the main symptoms? (e.g., pain, redness, itching, discharge)      Itching, pain/ discomfort to the outter ear, crusted flaky skin  3. MOVEMENT: \"Does the pain increase when the ear is moved up and down? Does pushing on the tab of tissue in the front of the ear increase the pain?\"       No  4. PAIN: \"How bad is the pain?\"  (Scale 1-10; mild, moderate or severe)    - MILD (1-3): doesn't interfere with normal activities     - MODERATE (4-7): interferes with normal activities or awakens from sleep     - SEVERE (8-10): excruciating pain, unable to do any normal activities       Mild, pain at outer ear; No pain in the ear  5. ONSET: \"When did the ear symptoms start?\"       1 week. Gradually worsened  6. DISCHARGE: \"Is there any discharge? What color is it?\"       Can feel dried flaky skin, in small amounts  7. SWIMMING: \"How often do you swim? Is it in a pool, lake or ocean?\"       no  8. Q-TIPS: \"Do you use cotton ear swabs (q-tips)? How often? Daily?\"      Used cotton ball with a little bit of oil: nem oil. Did try an old prescription of neomycin suspension, which seemed to help a " "little  9. PREGNANCY: \"Is there any chance you are pregnant?\" \"When was your last menstrual period?\"      n/a    Protocols used: EAR - SWIMMER'S - OTITIS [id number]      "

## 2021-02-19 NOTE — PROGRESS NOTES
Melva is a 66 year old who is being evaluated via a billable video visit.      How would you like to obtain your AVS? MyChart  If the video visit is dropped, the invitation should be resent by: Send to e-mail at: [email or web address]  Will anyone else be joining your video visit? No      Video Start Time: 936    Assessment & Plan     Acute eczematoid otitis externa of both ears  Itching and scaling predominate from a symptom standpoint.  No pain or drainage.  History of both asthma as well as hand dermatitis.  Without getting a good look at the inner ear is difficult but based on her symptoms alone I think we can treat her for in eczematoid dermatitis of the external canal and see how she responds  - acetic acid (VOSOL) 2 % otic solution; Place 4 drops into both ears 3 times daily                 No follow-ups on file.    Ian Go MD  [facility]   Melva is a 66 year old who presents for the following health issues     HPI       Concern - itchy ear  Onset: 1+ week  Description: itchy ears, worse on the left side  Intensity: mild  Progression of Symptoms:  worsening  Accompanying Signs & Symptoms: dry flaky skin  Previous history of similar problem: yes, previous hx of neomycin suspension  Precipitating factors:        Worsened by: n/a  Alleviating factors:        Improved by: neomycin susp.  Therapies tried and outcome: neomycin with improvement        Review of Systems   Constitutional, HEENT, cardiovascular, pulmonary, gi and gu systems are negative, except as otherwise noted.      Objective           Vitals:  No vitals were obtained today due to virtual visit.    Physical Exam   GENERAL: Healthy, alert and no distress  SKIN: Visible skin clear. No significant rash, abnormal pigmentation or lesions.                Video-Visit Details    Type of service:  Video Visit    Video End Time:9:52 AM    Originating Location (pt. Location): Home    Distant Location  (provider location):  [facility]     Platform used for Video Visit: Hussein

## 2021-03-09 ENCOUNTER — IMMUNIZATION (OUTPATIENT)
Dept: NURSING | Facility: CLINIC | Age: 66
End: 2021-03-09
Payer: COMMERCIAL

## 2021-03-09 PROCEDURE — 0011A PR COVID VAC MODERNA 100 MCG/0.5 ML IM: CPT

## 2021-03-09 PROCEDURE — 91301 PR COVID VAC MODERNA 100 MCG/0.5 ML IM: CPT

## 2021-03-14 ENCOUNTER — HEALTH MAINTENANCE LETTER (OUTPATIENT)
Age: 66
End: 2021-03-14

## 2021-04-06 ENCOUNTER — IMMUNIZATION (OUTPATIENT)
Dept: NURSING | Facility: CLINIC | Age: 66
End: 2021-04-06
Attending: INTERNAL MEDICINE
Payer: COMMERCIAL

## 2021-04-06 PROCEDURE — 0012A PR COVID VAC MODERNA 100 MCG/0.5 ML IM: CPT

## 2021-04-06 PROCEDURE — 91301 PR COVID VAC MODERNA 100 MCG/0.5 ML IM: CPT

## 2021-06-14 ENCOUNTER — E-VISIT (OUTPATIENT)
Dept: INTERNAL MEDICINE | Facility: CLINIC | Age: 66
End: 2021-06-14

## 2021-06-14 DIAGNOSIS — H93.8X1 EAR PRESSURE, RIGHT: Primary | ICD-10-CM

## 2021-06-14 PROCEDURE — 99421 OL DIG E/M SVC 5-10 MIN: CPT | Performed by: INTERNAL MEDICINE

## 2021-06-22 ENCOUNTER — TELEPHONE (OUTPATIENT)
Dept: INTERNAL MEDICINE | Facility: CLINIC | Age: 66
End: 2021-06-22

## 2021-06-22 NOTE — TELEPHONE ENCOUNTER
"Pt stating that see is having a \"diverticuosis attack:\"    Starting Saturday 6-19-21 pt felt constipated which is unusual for her; Pt normally has 2 BMS per day.     Late Sunday she started having cramping and had 2 BMs that were hard, took a stool softener Sunday evening and had 2 small formed stools.     Pt stopped eating solid food on Monday morning, just had clear liquid diet. Monday afternoon pt reported chills, nausea and mild abdominal pain/cramping. Denies fever (took temperature with electronic oral thermoeter and it was 98.9).     Pt states that today she is feeling much better. Denies fever, abdominal pain/cramping, diarrhea or nausea/vomiting.     Advised pt to continue CLD and be seen in clinic within 24 hours. Scheduled pt to be seen tomorrow. Advised pt that if she should develop fever, abdominal pain, nausea/ vomiting or diarrhea to call back RN triage. Pt agreeable to plan.     Juliana Pena RN      "

## 2021-06-23 ENCOUNTER — OFFICE VISIT (OUTPATIENT)
Dept: INTERNAL MEDICINE | Facility: CLINIC | Age: 66
End: 2021-06-23
Payer: COMMERCIAL

## 2021-06-23 VITALS
DIASTOLIC BLOOD PRESSURE: 74 MMHG | BODY MASS INDEX: 22.87 KG/M2 | HEART RATE: 71 BPM | OXYGEN SATURATION: 96 % | TEMPERATURE: 98.1 F | WEIGHT: 154.9 LBS | SYSTOLIC BLOOD PRESSURE: 116 MMHG

## 2021-06-23 DIAGNOSIS — R10.84 ABDOMINAL PAIN, GENERALIZED: Primary | ICD-10-CM

## 2021-06-23 PROCEDURE — 99213 OFFICE O/P EST LOW 20 MIN: CPT | Performed by: FAMILY MEDICINE

## 2021-06-23 NOTE — PROGRESS NOTES
Assessment & Plan     Abdominal pain, generalized                 Patient Instructions   Given the fact that the patient is largely asymptomatic at this point I told her that she could advance her diet.  We also discussed starting a fiber supplement.  I did not see any reason today to treat any further with any oral antibiotics.  Her exam today is largely normal.  She is now having normal bowel movements and no pain with them.      Return in about 2 weeks (around 7/7/2021) for If symptoms persist.    Luis Angel Espinoza MD  [facility] YARIEL Frye is a 66 year old who presents for the following health issues     HPI     Abdominal/Flank Pain  Onset/Duration: 3 days  Description:   Character: Dull ache  Location: epigastric region  Radiation: None  Intensity: mild  Progression of Symptoms:  improving  Accompanying Signs & Symptoms:  Fever/Chills: YES  Gas/Bloating: YES  Nausea: YES  Vomitting: no  Diarrhea: YES  Constipation: YES  Dysuria or Hematuria: no  History:   Trauma: no  Previous similar pain: YES  Previous tests done: CT  Precipitating factors:   Does the pain change with:     Food: no    Bowel Movement: YES    Urination: no   Other factors:  no  Therapies tried and outcome: Been on an all liquid diet since symptoms started  No LMP recorded (lmp unknown). Patient is postmenopausal.          Review of Systems   Constitutional, HEENT, cardiovascular, pulmonary, gi and gu systems are negative, except as otherwise noted.      Objective    /74 (BP Location: Right arm, Patient Position: Sitting, Cuff Size: Adult Regular)   Pulse 71   Temp 98.1  F (36.7  C) (Oral)   Wt 70.3 kg (154 lb 14.4 oz)   LMP  (LMP Unknown)   SpO2 96%   BMI 22.87 kg/m    Body mass index is 22.87 kg/m .  Physical Exam   GENERAL APPEARANCE: healthy, alert and no distress  ABDOMEN: soft, nontender, without hepatosplenomegaly or masses and bowel sounds normal

## 2021-06-23 NOTE — PATIENT INSTRUCTIONS
Given the fact that the patient is largely asymptomatic at this point I told her that she could advance her diet.  We also discussed starting a fiber supplement.  I did not see any reason today to treat any further with any oral antibiotics.  Her exam today is largely normal.  She is now having normal bowel movements and no pain with them.

## 2021-09-11 ENCOUNTER — NURSE TRIAGE (OUTPATIENT)
Dept: NURSING | Facility: CLINIC | Age: 66
End: 2021-09-11

## 2021-09-11 NOTE — TELEPHONE ENCOUNTER
Pt calling to report that she has an event tomorrow, but found out that she was exposed to someone with COVID-19 on 9/8/2021. She has not symptoms, and was vaccinated. She would like a rapid test if available. Writer reviewed covid-19 care advice and testing availability states that the test would be available within 3 days if tested with the nasal swab or saliva which [facility] has per the care advice. This would not be within 24 hours as she is asking for.     Disposition: Call PCP when office is open. She was transferred to scheduling to make a virtual appt and get an order to get tested if advised.     Angela Bustamante RN  [facility] Nurse Advisor 1:17 PM 9/11/2021    Reason for Disposition    [1] CLOSE CONTACT COVID-19 EXPOSURE within last 14 days AND [2] NO symptoms    Additional Information    Negative: COVID-19 lab test positive    Negative: [1] Lives with someone known to have influenza (flu test positive) AND [2] flu-like symptoms (e.g., cough, runny nose, sore throat, SOB; with or without fever)    Negative: [1] Symptoms of COVID-19 (e.g., cough, fever, SOB, or others) AND [2] HCP diagnosed COVID-19 based on symptoms    Negative: [1] Symptoms of COVID-19 (e.g., cough, fever, SOB, or others) AND [2] lives in an area with community spread    Negative: [1] Symptoms of COVID-19 (e.g., cough, fever, SOB, or others) AND [2] within 14 days of EXPOSURE (close contact) with diagnosed or suspected COVID-19 patient    Negative: [1] Symptoms of COVID-19 (e.g., cough, fever, SOB, or others) AND [2] within 14 days of travel from high-risk area for COVID-19 community spread (identified by CDC)    Negative: [1] Difficulty breathing (shortness of breath) occurs AND [2] onset > 14 days after COVID-19 EXPOSURE (Close Contact)    Negative: [1] Dry cough occurs AND [2] onset > 14 days after COVID-19 EXPOSURE    Negative: [1] Wet cough (i.e., white-yellow, yellow, green, or radha colored sputum) AND [2] onset > 14 days  after COVID-19 EXPOSURE    Negative: [1] Common cold symptoms AND [2] onset > 14 days after COVID-19 EXPOSURE    Negative: [1] COVID-19 vaccine series completed (fully vaccinated) AND [2] COVID-19 EXPOSURE AND [3] no symptoms    Negative: COVID-19 vaccine reaction suspected (e.g., fever, headache, muscle aches) occurring during days 1-3 after getting vaccine    Negative: COVID-19 vaccine, questions about    Negative: [1] CLOSE CONTACT COVID-19 EXPOSURE within last 14 days AND [2] needs COVID-19 lab test to return to work AND [3] NO symptoms    Negative: [1] CLOSE CONTACT COVID-19 EXPOSURE within last 14 days AND [2] exposed person is a  (e.g., police or paramedic) AND [3] NO symptoms    Negative: [1] CLOSE CONTACT COVID-19 EXPOSURE within last 14 days AND [2] exposed person is a healthcare worker who was NOT using all recommended personal protective equipment (e.g., a respirator-N95 mask, eye protection, gloves, and gown) AND [3] NO symptoms    Negative: [1] Living or working in a correctional facility, long-term care facility, or shelter (i.e., congregate setting; densely populated) AND [2] where an outbreak has occurred AND [3] NO symptoms    Protocols used: CORONAVIRUS (COVID-19) EXPOSURE-A- 3.25    COVID 19 Nurse Triage Plan/Patient Instructions    Please be aware that novel coronavirus (COVID-19) may be circulating in the community. If you develop symptoms such as fever, cough, or SOB or if you have concerns about the presence of another infection including coronavirus (COVID-19), please contact your health care provider or visit [email or web address].     Disposition/Instructions    Virtual Visit with provider recommended. Reference Visit Selection Guide.    Thank you for taking steps to prevent the spread of this virus.  o Limit your contact with others.  o Wear a simple mask to cover your cough.  o Wash your hands well and often.    Resources     [facility] [place]: About COVID-19:  [email or web address]    CDC: What to Do If You're Sick: www.cdc.gov/coronavirus/2019-ncov/about/steps-when-sick.html    CDC: Ending Home Isolation: www.cdc.gov/coronavirus/2019-ncov/hcp/disposition-in-home-patients.html     CDC: Caring for Someone: www.cdc.gov/coronavirus/2019-ncov/if-you-are-sick/care-for-someone.html     [facility]: Interim Guidance for Hospital Discharge to Home: www.[facility].[place]./diseases/coronavirus/hcp/hospdischarge.pdf    [facility] clinical trials (COVID-19 research studies): clinicalaffairs.[facility]-clinical-trials     Below are the COVID-19 hotlines at the [place] Department of Health ([facility]). Interpreters are available.   o For health questions: Call 077-504-3873 or 1-349.466.6563 (7 a.m. to 7 p.m.)  o For questions about schools and childcare: Call 528-240-2124 or 1-323.424.9776 (7 a.m. to 7 p.m.)

## 2021-10-04 ENCOUNTER — TELEPHONE (OUTPATIENT)
Dept: INTERNAL MEDICINE | Facility: CLINIC | Age: 66
End: 2021-10-04

## 2021-10-04 DIAGNOSIS — Z76.89 REFERRAL OF PATIENT: Primary | ICD-10-CM

## 2021-10-04 NOTE — TELEPHONE ENCOUNTER
Travel clinic referral placed - patient will need to schedule this appointment:    [facility]   [address]   [address] [place] [id number]   Phone: [phone]

## 2021-10-04 NOTE — TELEPHONE ENCOUNTER
Reason for Call:  Other call back    Detailed comments: Pt was wondering if she could get a travel screening done at her Physical appt and if she would need certain med's and if the doctor could prescribe them for traveling to [place], or if it would have to be with the travel clinic. And if a travel clinic she would like a phone number.     Phone Number Patient can be reached at: Cell number on file:    Telephone Information:   Mobile [phone]       Best Time: Anytime      Can we leave a detailed message on this number? YES    Call taken on 10/4/2021 at 11:58 AM by Elias Cardona

## 2021-11-08 SDOH — ECONOMIC STABILITY: TRANSPORTATION INSECURITY
IN THE PAST 12 MONTHS, HAS LACK OF TRANSPORTATION KEPT YOU FROM MEETINGS, WORK, OR FROM GETTING THINGS NEEDED FOR DAILY LIVING?: NO

## 2021-11-08 SDOH — ECONOMIC STABILITY: INCOME INSECURITY: HOW HARD IS IT FOR YOU TO PAY FOR THE VERY BASICS LIKE FOOD, HOUSING, MEDICAL CARE, AND HEATING?: NOT HARD AT ALL

## 2021-11-08 SDOH — ECONOMIC STABILITY: FOOD INSECURITY: WITHIN THE PAST 12 MONTHS, YOU WORRIED THAT YOUR FOOD WOULD RUN OUT BEFORE YOU GOT MONEY TO BUY MORE.: NEVER TRUE

## 2021-11-08 SDOH — HEALTH STABILITY: PHYSICAL HEALTH: ON AVERAGE, HOW MANY MINUTES DO YOU ENGAGE IN EXERCISE AT THIS LEVEL?: 60 MIN

## 2021-11-08 SDOH — ECONOMIC STABILITY: FOOD INSECURITY: WITHIN THE PAST 12 MONTHS, THE FOOD YOU BOUGHT JUST DIDN'T LAST AND YOU DIDN'T HAVE MONEY TO GET MORE.: NEVER TRUE

## 2021-11-08 SDOH — ECONOMIC STABILITY: TRANSPORTATION INSECURITY
IN THE PAST 12 MONTHS, HAS THE LACK OF TRANSPORTATION KEPT YOU FROM MEDICAL APPOINTMENTS OR FROM GETTING MEDICATIONS?: NO

## 2021-11-08 SDOH — HEALTH STABILITY: PHYSICAL HEALTH: ON AVERAGE, HOW MANY DAYS PER WEEK DO YOU ENGAGE IN MODERATE TO STRENUOUS EXERCISE (LIKE A BRISK WALK)?: 1 DAY

## 2021-11-08 SDOH — ECONOMIC STABILITY: INCOME INSECURITY: IN THE LAST 12 MONTHS, WAS THERE A TIME WHEN YOU WERE NOT ABLE TO PAY THE MORTGAGE OR RENT ON TIME?: NO

## 2021-11-08 ASSESSMENT — SOCIAL DETERMINANTS OF HEALTH (SDOH)
DO YOU BELONG TO ANY CLUBS OR ORGANIZATIONS SUCH AS CHURCH GROUPS UNIONS, FRATERNAL OR ATHLETIC GROUPS, OR SCHOOL GROUPS?: YES
HOW OFTEN DO YOU GET TOGETHER WITH FRIENDS OR RELATIVES?: ONCE A WEEK
IN A TYPICAL WEEK, HOW MANY TIMES DO YOU TALK ON THE PHONE WITH FAMILY, FRIENDS, OR NEIGHBORS?: THREE TIMES A WEEK
HOW OFTEN DO YOU ATTEND CHURCH OR RELIGIOUS SERVICES?: MORE THAN 4 TIMES PER YEAR

## 2021-11-08 ASSESSMENT — ENCOUNTER SYMPTOMS
EYE PAIN: 0
PALPITATIONS: 0
CHILLS: 0
DIARRHEA: 0
PARESTHESIAS: 0
HEMATOCHEZIA: 0
SHORTNESS OF BREATH: 0
ARTHRALGIAS: 1
HEADACHES: 0
FEVER: 0
HEMATURIA: 0
SORE THROAT: 0
MYALGIAS: 0
CONSTIPATION: 0
COUGH: 0
DIZZINESS: 0
NAUSEA: 0
WEAKNESS: 0
JOINT SWELLING: 0
HEARTBURN: 0
ABDOMINAL PAIN: 0
NERVOUS/ANXIOUS: 0
FREQUENCY: 0
BREAST MASS: 0
DYSURIA: 0

## 2021-11-08 ASSESSMENT — LIFESTYLE VARIABLES
HOW OFTEN DO YOU HAVE A DRINK CONTAINING ALCOHOL: MONTHLY OR LESS
HOW OFTEN DO YOU HAVE SIX OR MORE DRINKS ON ONE OCCASION: NEVER
HOW MANY STANDARD DRINKS CONTAINING ALCOHOL DO YOU HAVE ON A TYPICAL DAY: 1 OR 2

## 2021-11-08 ASSESSMENT — ACTIVITIES OF DAILY LIVING (ADL): CURRENT_FUNCTION: NO ASSISTANCE NEEDED

## 2021-11-11 ENCOUNTER — OFFICE VISIT (OUTPATIENT)
Dept: INTERNAL MEDICINE | Facility: CLINIC | Age: 66
End: 2021-11-11
Payer: COMMERCIAL

## 2021-11-11 VITALS
SYSTOLIC BLOOD PRESSURE: 118 MMHG | HEIGHT: 68 IN | HEART RATE: 69 BPM | OXYGEN SATURATION: 99 % | WEIGHT: 157.2 LBS | TEMPERATURE: 97.6 F | BODY MASS INDEX: 23.82 KG/M2 | DIASTOLIC BLOOD PRESSURE: 68 MMHG

## 2021-11-11 DIAGNOSIS — Z13.1 SCREENING FOR DIABETES MELLITUS: ICD-10-CM

## 2021-11-11 DIAGNOSIS — Z23 HIGH PRIORITY FOR 2019-NCOV VACCINE: ICD-10-CM

## 2021-11-11 DIAGNOSIS — E55.9 VITAMIN D DEFICIENCY: ICD-10-CM

## 2021-11-11 DIAGNOSIS — Z00.00 ROUTINE HISTORY AND PHYSICAL EXAMINATION OF ADULT: Primary | ICD-10-CM

## 2021-11-11 DIAGNOSIS — Z13.220 SCREENING FOR CHOLESTEROL LEVEL: ICD-10-CM

## 2021-11-11 DIAGNOSIS — Z76.89 REFERRAL OF PATIENT: ICD-10-CM

## 2021-11-11 DIAGNOSIS — Z12.31 ENCOUNTER FOR SCREENING MAMMOGRAM FOR BREAST CANCER: ICD-10-CM

## 2021-11-11 DIAGNOSIS — L40.9 SCALP PSORIASIS: ICD-10-CM

## 2021-11-11 PROCEDURE — 0064A COVID-19,PF,MODERNA (18+ YRS BOOSTER .25ML): CPT | Performed by: INTERNAL MEDICINE

## 2021-11-11 PROCEDURE — 99397 PER PM REEVAL EST PAT 65+ YR: CPT | Mod: 25 | Performed by: INTERNAL MEDICINE

## 2021-11-11 PROCEDURE — 91306 COVID-19,PF,MODERNA (18+ YRS BOOSTER .25ML): CPT | Performed by: INTERNAL MEDICINE

## 2021-11-11 RX ORDER — CLOBETASOL PROPIONATE 0.05 G/100ML
SHAMPOO TOPICAL
Qty: 118 ML | Refills: 1 | Status: SHIPPED | OUTPATIENT
Start: 2021-11-11

## 2021-11-11 ASSESSMENT — MIFFLIN-ST. JEOR: SCORE: 1302.58

## 2021-11-11 NOTE — PROGRESS NOTES
ASSESSMENT/PLAN                                                       (Z00.00) Routine history and physical examination of adult  (primary encounter diagnosis)  Comment: PMH, PSH, FH, SH, medications, allergies, immunizations, and preventative health measures reviewed and updated as appropriate.  Plan: see below for plans.      (Z23) High priority for 2019-nCoV vaccine  Plan: COVID-19 booster today.    (Z12.31) Encounter for screening mammogram for breast cancer  Plan: screening mammogram ordered - patient to schedule.     (Z13.220) Screening for cholesterol level  (Z13.1) Screening for diabetes mellitus  (E55.9) Vitamin D deficiency  Plan: fasting labs ordered - patient to schedule.     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      (Z76.89) Referral of patient  Plan: referred to dermatology for a full body skin exam - patient will be contacted to schedule.      Makayla Hawthorne MD   [address] [address]  T: [phone], F: [phone]    SUBJECTIVE                                                      Melva Saleh is a very pleasant 66 year old female who presents for a physical.    ROS:  Constitutional: no unintentional weight loss or gain reported; no fevers, chills, or sweats reported  Cardiovascular: no chest pain, palpitations, or edema reported  Respiratory: no cough, wheezing, shortness of breath, or dyspnea on exertion reported  Gastrointestinal: no nausea, vomiting, constipation, diarrhea, or abdominal pain reported  Genitourinary: no urinary frequency, urgency, dysuria, or hematuria reported  Integumentary: itchy rash - posterior scalp  Musculoskeletal: no back pain, muscle pain, joint pain, or joint swelling reported  Neurologic: no focal weakness, numbness, or tingling reported  Hematologic: no easy bruising  or bleeding reported  Endocrine: no heat or cold intolerance reported; no polyuria or polydipsia reported  Psychiatric: see PMH below    Past Medical History:   Diagnosis Date     BALBIR (generalized anxiety disorder)      Past Surgical History:   Procedure Laterality Date     COLONOSCOPY N/A 10/1/2018    Procedure: COLONOSCOPY;  colonoscopy;  Surgeon: Kendra Olson MD;  Location:  GI     RETINAL REATTACHMENT Right 2020     TONSILLECTOMY & ADENOIDECTOMY  as a child     Family History   Problem Relation Age of Onset     Coronary Artery Disease Father         s/p PCI (stent) later in life     Myocardial Infarction Paternal Grandfather         later in life     Breast Cancer Maternal Aunt      Breast Cancer Paternal Grandmother      Diabetes Type 2  Paternal Aunt      Cerebrovascular Disease No family hx of      Ovarian Cancer No family hx of      Colon Cancer No family hx of      Social History     Occupational History     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    3 adult children - live in area.    6 grandchildren (as of 2021).    No formal exercise currently.      Allergies   Allergen Reactions     Augmentin Difficulty breathing     Erythromycin Nausea     Penicillins Hives     Immunization History   Administered Date(s) Administered     COVID-19,PF,Moderna 03/09/2021, 04/06/2021     COVID-19,PF,Moderna Booster 11/11/2021     HEPA 12/30/2009, 05/06/2010, 11/09/2012     HepB 10/30/2009, 12/04/2009, 05/06/2010     Influenza (IIV3) PF 12/04/2009, 10/27/2010, 10/10/2012     Influenza Quad, Recombinant, pf(RIV4) (Flublok) 10/08/2018, 10/04/2019, 10/13/2020     Influenza Vaccine IM > 6 months Valent IIV4 (Alfuria,Fluzone) 10/26/2013,  "10/16/2014, 10/01/2015, 11/16/2017     Influenza Vaccine, 6+MO IM (QUADRIVALENT W/PRESERVATIVES) 10/27/2016     Influenza, Quad, High Dose, Pf, 65yr+ (Fluzone HD) 10/15/2021     Meningococcal (Menactra ) 12/22/2015     Meningococcal (Menomune ) 10/30/2009     Poliovirus, inactivated (IPV) 11/09/2012     TD (ADULT, 7+) 06/22/1998     TDAP Vaccine (Adacel) 08/19/2008, 10/08/2018     Tdap (Adacel,Boostrix) 08/19/2008     Typhoid Oral 12/22/2015     Zoster vaccine, live 10/27/2016     PREVENTATIVE HEALTH                                                      BMI: within normal limits   Blood pressure: within normal limits   Breast CA screening: DUE  Cervical CA screening: screening no longer indicated  Colon CA screening: up to date   Lung CA screening: n/a   Dexa: up to date   Screening cholesterol: DUE  Screening diabetes: DUE  STD testing: no risk factors present  Alcohol misuse screening: alcohol use reviewed - no intervention indicated at this time  Immunizations: reviewed; COVID-19 booster, Pneumovax, and Shingrix series DUE    OBJECTIVE                                                      /68 (Cuff Size: Adult Regular)   Pulse 69   Temp 97.6  F (36.4  C) (Tympanic)   Ht 1.721 m (5' 7.75\")   Wt 71.3 kg (157 lb 3.2 oz)   LMP  (LMP Unknown)   SpO2 99%   BMI 24.08 kg/m    Constitutional: well-appearing  Head, Ears, and Eyes: normocephalic; normal external auditory canal and pinna; tympanic membranes visualized and normal; normal lids and conjunctivae  Neck: supple, symmetric, no thyromegaly or lymphadenopathy  Respiratory: normal respiratory effort; clear to auscultation bilaterally  Cardiovascular: regular rate and rhythm; no edema  Gastrointestinal: soft, non-tender, and non-distended; no organomegaly or masses  Musculoskeletal: normal gait and station  Psych: normal judgment and insight; normal mood and affect; recent and remote memory intact  Integumentary: mildly erythematous rash with overlying white " scalp posterior scalp/upper posterior neck    ---  (Note was completed, in part, with [employer] voice-recognition software. Documentation was reviewed, but some grammatical, spelling, and word errors may remain.)

## 2021-11-11 NOTE — PATIENT INSTRUCTIONS
COVID-19 booster today.    Please schedule fasting labs and mammogram via [facility].    Shingrix series and Pneumovax can be done at any pharmacy at your convenience.

## 2021-11-15 ENCOUNTER — TRANSFERRED RECORDS (OUTPATIENT)
Dept: HEALTH INFORMATION MANAGEMENT | Facility: CLINIC | Age: 66
End: 2021-11-15
Payer: COMMERCIAL

## 2022-03-21 ENCOUNTER — ANCILLARY PROCEDURE (OUTPATIENT)
Dept: MAMMOGRAPHY | Facility: CLINIC | Age: 67
End: 2022-03-21
Attending: INTERNAL MEDICINE
Payer: COMMERCIAL

## 2022-03-21 DIAGNOSIS — Z12.31 VISIT FOR SCREENING MAMMOGRAM: ICD-10-CM

## 2022-03-21 DIAGNOSIS — Z12.31 ENCOUNTER FOR SCREENING MAMMOGRAM FOR BREAST CANCER: ICD-10-CM

## 2022-03-21 PROCEDURE — 77063 BREAST TOMOSYNTHESIS BI: CPT | Mod: TC | Performed by: RADIOLOGY

## 2022-03-21 PROCEDURE — 77067 SCR MAMMO BI INCL CAD: CPT | Mod: TC | Performed by: RADIOLOGY

## 2022-03-24 ENCOUNTER — LAB (OUTPATIENT)
Dept: LAB | Facility: CLINIC | Age: 67
End: 2022-03-24
Payer: COMMERCIAL

## 2022-03-24 DIAGNOSIS — E55.9 VITAMIN D DEFICIENCY: ICD-10-CM

## 2022-03-24 DIAGNOSIS — Z13.220 SCREENING FOR CHOLESTEROL LEVEL: ICD-10-CM

## 2022-03-24 DIAGNOSIS — Z13.1 SCREENING FOR DIABETES MELLITUS: ICD-10-CM

## 2022-03-24 LAB
ALBUMIN SERPL-MCNC: 4 G/DL (ref 3.4–5)
ALP SERPL-CCNC: 72 U/L (ref 40–150)
ALT SERPL W P-5'-P-CCNC: 32 U/L (ref 0–50)
ANION GAP SERPL CALCULATED.3IONS-SCNC: 5 MMOL/L (ref 3–14)
AST SERPL W P-5'-P-CCNC: 15 U/L (ref 0–45)
BILIRUB SERPL-MCNC: 0.6 MG/DL (ref 0.2–1.3)
BUN SERPL-MCNC: 15 MG/DL (ref 7–30)
CALCIUM SERPL-MCNC: 9.4 MG/DL (ref 8.5–10.1)
CHLORIDE BLD-SCNC: 108 MMOL/L (ref 94–109)
CHOLEST SERPL-MCNC: 239 MG/DL
CO2 SERPL-SCNC: 27 MMOL/L (ref 20–32)
CREAT SERPL-MCNC: 0.65 MG/DL (ref 0.52–1.04)
FASTING STATUS PATIENT QL REPORTED: YES
GFR SERPL CREATININE-BSD FRML MDRD: >90 ML/MIN/1.73M2
GLUCOSE BLD-MCNC: 101 MG/DL (ref 70–99)
HDLC SERPL-MCNC: 57 MG/DL
LDLC SERPL CALC-MCNC: 164 MG/DL
NONHDLC SERPL-MCNC: 182 MG/DL
POTASSIUM BLD-SCNC: 3.8 MMOL/L (ref 3.4–5.3)
PROT SERPL-MCNC: 7.2 G/DL (ref 6.8–8.8)
SODIUM SERPL-SCNC: 140 MMOL/L (ref 133–144)
TRIGL SERPL-MCNC: 89 MG/DL

## 2022-03-24 PROCEDURE — 36415 COLL VENOUS BLD VENIPUNCTURE: CPT

## 2022-03-24 PROCEDURE — 80053 COMPREHEN METABOLIC PANEL: CPT

## 2022-03-24 PROCEDURE — 82306 VITAMIN D 25 HYDROXY: CPT

## 2022-03-24 PROCEDURE — 80061 LIPID PANEL: CPT

## 2022-03-25 LAB — DEPRECATED CALCIDIOL+CALCIFEROL SERPL-MC: 36 UG/L (ref 20–75)

## 2022-06-07 ENCOUNTER — NURSE TRIAGE (OUTPATIENT)
Dept: INTERNAL MEDICINE | Facility: CLINIC | Age: 67
End: 2022-06-07
Payer: COMMERCIAL

## 2022-06-07 NOTE — TELEPHONE ENCOUNTER
6/1/22 Patient developed runny nose  6/2 Woke up with increased nasal discharge, fatigue.   6/4 Pt felt mild chest congestion. Patient took home covid test - negative.     Patient has productive cough with clear/white sputum.  Pt hears 'noise' from lungs when taking deep breaths. Pt is unable to explain what sound is. Denies fever, wheezing, SOB, and difficulty breathing.     Currently taking Mucinex and Flonase.    Per protocol, patient can treat at home. Care advice provided, patient verbalized agreement and will contact clinic if fever develops or if symptoms worsen 6/8.    HOME CARE:   * You should be able to treat this at home.      REASSURANCE AND EDUCATION:   * It sounds like an uncomplicated cold that we can treat at home.  * Colds are very common and may make you feel uncomfortable.  * Colds are caused by viruses, and no medicine or 'shot' will cure an uncomplicated cold.  * Colds are usually not serious.  * Here is some care advice that should help.      COUGH MEDICINES:  * OTC COUGH SYRUPS: The most common cough suppressant in OTC cough medications is dextromethorphan. Often the letters 'DM' appear in the name.  * OTC COUGH DROPS: Cough drops can help a lot, especially for mild coughs. They reduce coughing by soothing your irritated throat and removing that tickle sensation in the back of the throat. Cough drops also have the advantage of portability - you can carry them with you.  * HOME REMEDY - HARD CANDY: Hard candy works just as well as medicine-flavored OTC cough drops. People who have diabetes should use sugar-free candy.  * HOME REMEDY - HONEY: This old home remedy has been shown to help decrease coughing at night. The adult dosage is 2 teaspoons (10 ml) at bedtime. Honey should not be given to infants under one year of age.      COUGHING SPASMS:   * Drink warm fluids. Inhale warm mist (Reason: both relax the airway and loosen up the phlegm).   * Suck on cough drops or hard candy to coat the  "irritated throat.      PREVENT DEHYDRATION:   * Drink adequate liquids.  * This will help soothe an irritated or dry throat and loosen up the phlegm.      HUMIDIFIER:  * If the air is dry, use a humidifier in the bedroom.  * Dry air makes coughs worse.      EXPECTED COURSE:   * The expected course depends on what is causing the cough.  * Viral bronchitis (chest cold) causes a cough that lasts 1 to 3 weeks. Sometimes you may cough up lots of phlegm (sputum, mucus). The mucus can normally be white, gray, yellow, or green.      CALL BACK IF:  * Difficulty breathing occurs  * Fever lasts more than 3 days   * Nasal discharge lasts more than 10 days   * Cough lasts more than 3 weeks   * You become worse                1. ONSET: \"When did the nasal discharge start?\"       6/1/22  2. AMOUNT: \"How much discharge is there?\"       None now.  3. COUGH: \"Do you have a cough?\" If yes, ask: \"Describe the color of your sputum\" (clear, white, yellow, green)      Productive cough, clear/white sputum with tinge of green (not much)  4. RESPIRATORY DISTRESS: \"Describe your breathing.\"       Pt hears 'noise' from lungs when taking deep breaths. Pt is unable to explain what sound is, denies wheezing.   Has Hx of asthma with URI  5. FEVER: \"Do you have a fever?\" If so, ask: \"What is your temperature, how was it measured, and when did it start?\"      No  6. SEVERITY: \"Overall, how bad are you feeling right now?\" (e.g., doesn't interfere with normal activities, staying home from school/work, staying in bed)       In general, there is improvement.   7. OTHER SYMPTOMS: \"Do you have any other symptoms?\" (e.g., sore throat, earache, wheezing, vomiting)      Muscle Aches, Fatigue    Reason for Disposition    Cough is the main symptom    Cough with cold symptoms (e.g., runny nose, postnasal drip, throat clearing)    Additional Information    Negative: Severe difficulty breathing (e.g., struggling for each breath, speaks in single words)    " Negative: Very weak (can't stand)    Negative: Sounds like a life-threatening emergency to the triager    Negative: Runny nose is caused by pollen or other allergies    Negative: Bluish (or gray) lips or face    Negative: Severe difficulty breathing (e.g., struggling for each breath, speaks in single words)    Negative: Rapid onset of cough and has hives    Negative: Coughing started suddenly after medicine, an allergic food or bee sting    Negative: Difficulty breathing after exposure to flames, smoke, or fumes    Negative: Sounds like a life-threatening emergency to the triager    Negative: Previous asthma attacks and this feels like asthma attack    Negative: Chest pain present when not coughing    Negative: Difficulty breathing    Negative: Passed out (i.e., fainted, collapsed and was not responding)    Negative: Patient sounds very sick or weak to the triager    Negative: Coughed up > 1 tablespoon (15 ml) blood (Exception: blood-tinged sputum)    Negative: Fever > 103 F (39.4 C)    Negative: Fever > 101 F (38.3 C) and over 60 years of age    Negative: Fever > 100.0 F (37.8 C) and has diabetes mellitus or a weak immune system (e.g., HIV positive, cancer chemotherapy, organ transplant, splenectomy, chronic steroids)    Negative: Fever > 100.0 F (37.8 C) and bedridden (e.g., nursing home patient, stroke, chronic illness, recovering from surgery)    Negative: Increasing ankle swelling    Negative: Wheezing is present    Negative: SEVERE coughing spells (e.g., whooping sound after coughing, vomiting after coughing)    Negative: Coughing up radha-colored (reddish-brown) or blood-tinged sputum    Negative: Fever present > 3 days (72 hours)    Negative: Fever returns after gone for over 24 hours and symptoms worse or not improved    Negative: Using nasal washes and pain medicine > 24 hours and sinus pain persists    Negative: Known COPD or other severe lung disease (i.e., bronchiectasis, cystic fibrosis, lung surgery)  and worsening symptoms (i.e., increased sputum purulence or amount, increased breathing difficulty)    Negative: Continuous (nonstop) coughing interferes with work or school and no improvement using cough treatment per Care Advice    Negative: Patient wants to be seen    Negative: Cough has been present for > 3 weeks    Negative: Allergy symptoms are also present (e.g., itchy eyes, clear nasal discharge, postnasal drip)    Negative: Nasal discharge present > 10 days    Negative: Exposure to TB (Tuberculosis)    Negative: Taking an ACE Inhibitor medication (e.g., benazepril/LOTENSIN, captopril/CAPOTEN, enalapril/VASOTEC, lisinopril/ZESTRIL)    Negative: Cough with no complications    Protocols used: COMMON COLD-A-OH, COUGH-A-OH

## 2022-06-16 ENCOUNTER — TRANSFERRED RECORDS (OUTPATIENT)
Dept: INTERNAL MEDICINE | Facility: CLINIC | Age: 67
End: 2022-06-16

## 2022-06-20 ENCOUNTER — OFFICE VISIT (OUTPATIENT)
Dept: URGENT CARE | Facility: URGENT CARE | Age: 67
End: 2022-06-20
Payer: COMMERCIAL

## 2022-06-20 ENCOUNTER — ANCILLARY PROCEDURE (OUTPATIENT)
Dept: GENERAL RADIOLOGY | Facility: CLINIC | Age: 67
End: 2022-06-20
Attending: PHYSICIAN ASSISTANT
Payer: COMMERCIAL

## 2022-06-20 VITALS
TEMPERATURE: 99.4 F | RESPIRATION RATE: 17 BRPM | SYSTOLIC BLOOD PRESSURE: 149 MMHG | WEIGHT: 160.8 LBS | BODY MASS INDEX: 24.63 KG/M2 | DIASTOLIC BLOOD PRESSURE: 83 MMHG | OXYGEN SATURATION: 97 % | HEART RATE: 104 BPM

## 2022-06-20 DIAGNOSIS — R05.9 COUGH: ICD-10-CM

## 2022-06-20 DIAGNOSIS — R06.2 WHEEZING: ICD-10-CM

## 2022-06-20 DIAGNOSIS — B96.89 ACUTE BACTERIAL BRONCHITIS: ICD-10-CM

## 2022-06-20 DIAGNOSIS — R05.9 COUGH: Primary | ICD-10-CM

## 2022-06-20 DIAGNOSIS — J20.8 ACUTE BACTERIAL BRONCHITIS: ICD-10-CM

## 2022-06-20 PROCEDURE — U0003 INFECTIOUS AGENT DETECTION BY NUCLEIC ACID (DNA OR RNA); SEVERE ACUTE RESPIRATORY SYNDROME CORONAVIRUS 2 (SARS-COV-2) (CORONAVIRUS DISEASE [COVID-19]), AMPLIFIED PROBE TECHNIQUE, MAKING USE OF HIGH THROUGHPUT TECHNOLOGIES AS DESCRIBED BY CMS-2020-01-R: HCPCS | Performed by: PHYSICIAN ASSISTANT

## 2022-06-20 PROCEDURE — 71046 X-RAY EXAM CHEST 2 VIEWS: CPT | Mod: TC | Performed by: RADIOLOGY

## 2022-06-20 PROCEDURE — U0005 INFEC AGEN DETEC AMPLI PROBE: HCPCS | Performed by: PHYSICIAN ASSISTANT

## 2022-06-20 PROCEDURE — 99214 OFFICE O/P EST MOD 30 MIN: CPT | Performed by: PHYSICIAN ASSISTANT

## 2022-06-20 RX ORDER — BENZONATATE 200 MG/1
200 CAPSULE ORAL 3 TIMES DAILY PRN
Qty: 21 CAPSULE | Refills: 0 | Status: SHIPPED | OUTPATIENT
Start: 2022-06-20 | End: 2022-06-27

## 2022-06-20 RX ORDER — PREDNISONE 20 MG/1
20 TABLET ORAL 2 TIMES DAILY
Qty: 10 TABLET | Refills: 0 | Status: SHIPPED | OUTPATIENT
Start: 2022-06-20 | End: 2023-09-08

## 2022-06-20 RX ORDER — DOXYCYCLINE 100 MG/1
100 CAPSULE ORAL 2 TIMES DAILY
Qty: 20 CAPSULE | Refills: 0 | Status: SHIPPED | OUTPATIENT
Start: 2022-06-20 | End: 2023-09-08

## 2022-06-20 NOTE — PROGRESS NOTES
Assessment & Plan     Cough    covid pending  Chest xray Negative for acute findings, read by Faustino Machuca PA-C [facility] at time of visit.  Tessalon for coughing    - Symptomatic; Unknown COVID-19 Virus (Coronavirus) by PCR Nose  - XR Chest 2 Views; Future  - predniSONE (DELTASONE) 20 MG tablet; Take 1 tablet (20 mg) by mouth 2 times daily  - benzonatate (TESSALON) 200 MG capsule; Take 1 capsule (200 mg) by mouth 3 times daily as needed    Wheezing    Chest xray Negative for acute findings, read by Faustino COSTELLO at time of visit.    Prednisone and albuterol for wheezing  - XR Chest 2 Views; Future  - predniSONE (DELTASONE) 20 MG tablet; Take 1 tablet (20 mg) by mouth 2 times daily    Acute bacterial bronchitis    When your back pain is new, you may find that certain positions will ease your pain. Gently try each of the following positions until you find one that eases your pain. Once you find a position of comfort, use it as often as you like while you recover. Return to your daily routine as soon as possible.  Use walking to help relieve your discomfort. Try taking a short walk every 3 to 4 hours during the day. Walk for a few minutes inside your home or take longer walks outside, on a treadmill or at a mall. Slowly increase the amount of time you walk. Expect discomfort when you begin, but it should lessen as your back starts to recover.  Your back pain should improve over the first couple of weeks. As it improves, you should be able to return to your normal activities.  - doxycycline monohydrate (MONODOX) 100 MG capsule; Take 1 capsule (100 mg) by mouth 2 times daily    At today's visit with Melva Saleh , we discussed results, diagnosis, medications and formulated a plan.  We also discussed red flags for immediate return to clinic/ER, as well as indications for follow up if no improvement. Patient understood and agreed to plan. Melva Saleh was discharged with stable vitals and has no further questions.      No follow-ups on file.    Faustino Machuca, [facility], [facility]  [facility]    Results for orders placed or performed in visit on 06/20/22   XR Chest 2 Views     Status: None    Narrative    XR CHEST 2 VW  6/20/2022 4:54 PM       INDICATION: Cough and wheezing  COMPARISON: None       Impression    IMPRESSION: The lungs are hyperinflated. Mild linear scarring or  atelectasis in the left lung base. Lungs are otherwise clear. No  evidence of pneumonia.    NELY HEWITT MD         SYSTEM ID:  [id number]       Mark Frye is a 67 year old, presenting for the following health issues:  Respiratory Problems (66 yo  F presents with the following complaint  body aches cough cold like sx's onset June 1st     now having tightness in chest w/ cracking per minute clinic possibly bronchitis or asthma given an inhaler sx's improved   but T-1 the aching returned productive cough  w/ green mucous  tx- ibuprofen )      HPI     Melva Saleh, 67 year old, female presents to the urgent care today with:   Respiratory Problems (66 yo  F presents with the following complaint  body aches cough cold like sx's onset June 1st     now having tightness in chest w/ cracking per minute clinic possibly bronchitis or asthma given an inhaler sx's improved   but T-1 the aching returned productive cough  w/ green mucous  tx- ibuprofen )      Review of Systems   Constitutional, HEENT, cardiovascular, pulmonary, gi and gu systems are negative, except as otherwise noted.      Objective    BP (!) 149/83   Pulse 104   Temp 99.4  F (37.4  C)   Resp 17   Wt 72.9 kg (160 lb 12.8 oz)   LMP  (LMP Unknown)   SpO2 97%   BMI 24.63 kg/m    Body mass index is 24.63 kg/m .  Physical Exam   GENERAL: healthy, alert and no distress  EYES: Eyes grossly normal to inspection, PERRL and conjunctivae and sclerae normal  HENT: ear canals and TM's normal, nose and mouth without ulcers or lesions  NECK: no adenopathy, no asymmetry, masses, or scars  and thyroid normal to palpation  RESP: expiratory wheezes generalized with bronchospasms  CV: regular rate and rhythm, normal S1 S2, no S3 or S4, no murmur, click or rub, no peripheral edema and peripheral pulses strong  ABDOMEN: soft, nontender, no hepatosplenomegaly, no masses and bowel sounds normal  MS: no gross musculoskeletal defects noted, no edema  SKIN: no suspicious lesions or rashes  NEURO: Normal strength and tone, mentation intact and speech normal  PSYCH: mentation appears normal, affect normal/bright                    .  ..

## 2022-06-21 LAB — SARS-COV-2 RNA RESP QL NAA+PROBE: NEGATIVE

## 2022-10-10 ENCOUNTER — HEALTH MAINTENANCE LETTER (OUTPATIENT)
Age: 67
End: 2022-10-10

## 2022-10-20 ENCOUNTER — IMMUNIZATION (OUTPATIENT)
Dept: NURSING | Facility: CLINIC | Age: 67
End: 2022-10-20
Payer: COMMERCIAL

## 2022-10-20 PROCEDURE — 91313 COVID-19,PF,MODERNA BIVALENT: CPT

## 2022-10-20 PROCEDURE — 90471 IMMUNIZATION ADMIN: CPT

## 2022-10-20 PROCEDURE — 90662 IIV NO PRSV INCREASED AG IM: CPT

## 2022-10-20 PROCEDURE — 0134A COVID-19,PF,MODERNA BIVALENT: CPT

## 2023-03-25 ENCOUNTER — HEALTH MAINTENANCE LETTER (OUTPATIENT)
Age: 68
End: 2023-03-25

## 2023-04-10 ENCOUNTER — ANCILLARY PROCEDURE (OUTPATIENT)
Dept: MAMMOGRAPHY | Facility: CLINIC | Age: 68
End: 2023-04-10
Attending: INTERNAL MEDICINE
Payer: COMMERCIAL

## 2023-04-10 DIAGNOSIS — Z12.31 VISIT FOR SCREENING MAMMOGRAM: ICD-10-CM

## 2023-04-10 PROCEDURE — 77067 SCR MAMMO BI INCL CAD: CPT | Mod: TC | Performed by: RADIOLOGY

## 2023-04-14 ENCOUNTER — NURSE TRIAGE (OUTPATIENT)
Dept: INTERNAL MEDICINE | Facility: CLINIC | Age: 68
End: 2023-04-14
Payer: COMMERCIAL

## 2023-04-14 ENCOUNTER — E-VISIT (OUTPATIENT)
Dept: INTERNAL MEDICINE | Facility: CLINIC | Age: 68
End: 2023-04-14
Payer: COMMERCIAL

## 2023-04-14 DIAGNOSIS — L98.9 SKIN LESION: Primary | ICD-10-CM

## 2023-04-14 PROCEDURE — 99421 OL DIG E/M SVC 5-10 MIN: CPT | Performed by: INTERNAL MEDICINE

## 2023-04-14 NOTE — TELEPHONE ENCOUNTER
"Nurse Triage SBAR    Is this a 2nd Level Triage? NO    Situation: Patient calling clinic reporting two new small lumps to the left of her sternum that she noticed within the last few days. Patient is seeking recommendations.     Background: Patient reported she has a hx of skin lumps forming on her \"trunk area\" but has not had any lumps on her chest area before.     Assessment: Patient did not report chest pain/pressure, no difficulty breathing, no widespread swelling, and no fever. Patient reported the two lumps are the same color as her skin, have rounded edges, and itch but do not hurt. Patient denies any bug bites, animal bites, or eating any new foods.     Protocol Recommended Disposition:   No disposition on file.    Recommendation: Per RN judgement, RN advised patient to begin an E-Visit and take pictures of lumps for a provider to assess them. Patient agreed with plan of care and will begin an E-Visit now.      Does the patient meet one of the following criteria for ADS visit consideration? 16+ years old, with an [facility] PCP     TIP  Providers, please consider if this condition is appropriate for management at one of our Acute and Diagnostic Services sites.     If patient is a good candidate, please use dotphrase <dot>triageresponse and select Refer to ADS to document.      Additional Information    Negative: Sounds like a life-threatening emergency to the triager    Negative: Small growth, spot, bump, or pigmented area of skin (e.g., moles, skin tags, wart, melanoma, skin cancer)    Negative: Followed a skin injury    Negative: Follows an insect bite    Negative: Swelling of lymph node suspected    Negative: Swelling of vaccination site    Negative: Swelling of entire face    Negative: Swelling of scrotum    Negative: Swelling of labia    Negative: Swelling of surgical incision    Negative: Swelling with a skin rash    Negative: Hernia suspected (bulge in groin or abdomen) and painful or vomiting    Negative: " Swollen lump in groin and pulsating (like heartbeat)    Negative: Patient sounds very sick or weak to the triager    Negative: SEVERE pain (e.g., excruciating)    Negative: Swelling is painful to touch AND fever    Negative: Swelling is red and fever    Negative: Swelling is red and size > 2 inches (5.0 cm)    Negative: Swelling is painful to touch and no fever    Negative: Looks like a boil, infected sore, deep ulcer or other infected rash    Negative: Small swelling or lump present > 1 week    Negative: New-onset hernia suspected (reducible bulge in groin or abdomen; non-tender) and NO pain or vomiting    Negative: Patient wants to be seen    Negative: Small swelling or lump present < 1 week    Protocols used: SKIN LUMP OR LOCALIZED SWELLING-A-OH       Hydroxyzine Pregnancy And Lactation Text: This medication is not safe during pregnancy and should not be taken. It is also excreted in breast milk and breast feeding isn't recommended.

## 2023-08-21 ENCOUNTER — DOCUMENTATION ONLY (OUTPATIENT)
Dept: INTERNAL MEDICINE | Facility: CLINIC | Age: 68
End: 2023-08-21

## 2023-08-21 ENCOUNTER — NURSE TRIAGE (OUTPATIENT)
Dept: INTERNAL MEDICINE | Facility: CLINIC | Age: 68
End: 2023-08-21

## 2023-08-21 NOTE — PROGRESS NOTES
Have not seen patient since 2021 and no upcoming appointments with me.    Please cancel lab appointment.

## 2023-08-21 NOTE — TELEPHONE ENCOUNTER
Pt called requesting lab order to rule out strep. She had her grandson last Thursday over at her house. Grandson tested positive for strep. Pt has a headache. Denies chest pain or breathing problems, fever, ear pain. No cough or runny nose. Denies known COVID-19 exposure.  Triage advised she schedule e-visit, VV or see UC to discuss appropriate labs orders. Pt opts to schedule VV and lab only visit today. Appts were scheduled.     Reason for Disposition   Patient wants to be seen    Additional Information   Negative: Difficult to awaken or acting confused (e.g., disoriented, slurred speech)   Negative: Weakness of the face, arm or leg on one side of the body and new-onset   Negative: Numbness of the face, arm or leg on one side of the body and new-onset   Negative: Loss of speech or garbled speech and new-onset   Negative: Passed out (i.e., fainted, collapsed and was not responding)   Negative: Sounds like a life-threatening emergency to the triager   Negative: Followed a head injury within last 3 days   Negative: Traumatic Brain Injury (TBI) is suspected   Negative: Sinus pain of forehead and yellow or green nasal discharge   Negative: Pregnant   Negative: Unable to walk without falling   Negative: Stiff neck (can't touch chin to chest)   Negative: Possibility of carbon monoxide exposure   Negative: SEVERE headache, states 'worst headache' of life   Negative: SEVERE headache, sudden-onset (i.e., reaching maximum intensity within seconds to 1 hour)   Negative: Severe pain in one eye   Negative: Loss of vision or double vision  (Exception: Same as prior migraines.)   Negative: Patient sounds very sick or weak to the triager   Negative: Fever > 103 F (39.4 C)   Negative: Fever > 100.0 F (37.8 C) and has diabetes mellitus or a weak immune system (e.g., HIV positive, cancer chemotherapy, organ transplant, splenectomy, chronic steroids)   Negative: SEVERE headache (e.g., excruciating) and has had severe headaches  before   Negative: SEVERE headache and not relieved by pain meds   Negative: SEVERE headache and vomiting   Negative: SEVERE headache and fever    Protocols used: Headache-A-OH

## 2023-08-21 NOTE — PROGRESS NOTES
Melva SKYE Saleh has an upcoming lab appointment:    Future Appointments   Date Time Provider Department Center   8/21/2023  3:00 PM [facility]   8/22/2023  2:00 PM Ted Miguel MD [facility]     Patient is scheduled for the following lab(s): labs    There is no order available. Please review and place either future orders or HMPO (Review of Health Maintenance Protocol Orders), as appropriate.    Health Maintenance Due   Topic    ANNUAL REVIEW OF HM ORDERS      Chiqui Smith

## 2023-08-22 ENCOUNTER — VIRTUAL VISIT (OUTPATIENT)
Dept: FAMILY MEDICINE | Facility: CLINIC | Age: 68
End: 2023-08-22
Payer: COMMERCIAL

## 2023-08-22 DIAGNOSIS — Z20.818 STREPTOCOCCUS EXPOSURE: Primary | ICD-10-CM

## 2023-08-22 PROCEDURE — 99213 OFFICE O/P EST LOW 20 MIN: CPT | Mod: 95 | Performed by: FAMILY MEDICINE

## 2023-08-22 NOTE — PROGRESS NOTES
"Melva is a 68 year old who is being evaluated via a billable telephone visit.      What phone number would you like to be contacted at? [phone]  How would you like to obtain your AVS? Jacky    Distant Location (provider location):  On-site    Assessment & Plan     Streptococcus exposure  Patient has some URI symptoms with mild headache and fatigue tiredness without any sore throat.  She possibly has strep exposure with her grandson.  She is somewhat concerned.  I suggested continue symptomatic care and do home COVID test if positive notify us if negative then she can do a strep test which is ordered  - Streptococcus A Rapid Screen w/Reflex to PCR - Clinic Collect; Future      Ted Miguel MD  [facility]    Subjective   Melva is a 68 year old, presenting for the following health issues:  Consult (Headache recent positive strep exposure )      History of Present Illness       Headaches:   Since the patient's last clinic visit, headaches are: improved  The patient is getting headaches:  Just a lil bit better  She is able to do normal daily activities when she has a migraine.  The patient is taking the following rescue/relief medications:  Ibuprofen (Advil, Motrin)   Patient states \"I get only a small amount of relief\" from the rescue/relief medications.   The patient is taking the following medications to prevent migraines:  No medications to prevent migraines  In the past 4 weeks, the patient has gone to an Urgent Care or Emergency Room 0 times times due to headaches.    She eats 4 or more servings of fruits and vegetables daily.She consumes 0 sweetened beverage(s) daily.She exercises with enough effort to increase her heart rate 10 to 19 minutes per day.  She exercises with enough effort to increase her heart rate 3 or less days per week.   She is taking medications regularly.        Review of Systems   Constitutional, HEENT, cardiovascular, pulmonary, gi and gu systems are negative, " except as otherwise noted.      Objective           Vitals:  No vitals were obtained today due to virtual visit.    Physical Exam   healthy, alert, and no distress  PSYCH: Alert and oriented times 3; coherent speech, normal   rate and volume, able to articulate logical thoughts, able   to abstract reason, no tangential thoughts, no hallucinations   or delusions  Her affect is normal  RESP: No cough, no audible wheezing, able to talk in full sentences  Remainder of exam unable to be completed due to telephone visits          Phone call duration: 10 minutes

## 2023-08-24 ENCOUNTER — LAB (OUTPATIENT)
Dept: LAB | Facility: CLINIC | Age: 68
End: 2023-08-24
Attending: FAMILY MEDICINE
Payer: COMMERCIAL

## 2023-08-24 DIAGNOSIS — Z20.818 STREPTOCOCCUS EXPOSURE: ICD-10-CM

## 2023-08-24 LAB
DEPRECATED S PYO AG THROAT QL EIA: NEGATIVE
GROUP A STREP BY PCR: NOT DETECTED

## 2023-08-24 PROCEDURE — 87651 STREP A DNA AMP PROBE: CPT

## 2023-09-08 ENCOUNTER — LAB (OUTPATIENT)
Dept: LAB | Facility: CLINIC | Age: 68
End: 2023-09-08
Payer: COMMERCIAL

## 2023-09-08 ENCOUNTER — VIRTUAL VISIT (OUTPATIENT)
Dept: INTERNAL MEDICINE | Facility: CLINIC | Age: 68
End: 2023-09-08
Payer: COMMERCIAL

## 2023-09-08 DIAGNOSIS — G62.9 NEUROPATHY: Primary | ICD-10-CM

## 2023-09-08 DIAGNOSIS — G62.9 NEUROPATHY: ICD-10-CM

## 2023-09-08 LAB
ALBUMIN SERPL BCG-MCNC: 4.7 G/DL (ref 3.5–5.2)
ALP SERPL-CCNC: 80 U/L (ref 35–104)
ALT SERPL W P-5'-P-CCNC: 29 U/L (ref 0–50)
ANION GAP SERPL CALCULATED.3IONS-SCNC: 10 MMOL/L (ref 7–15)
AST SERPL W P-5'-P-CCNC: 34 U/L (ref 0–45)
BILIRUB SERPL-MCNC: 0.7 MG/DL
BUN SERPL-MCNC: 15.5 MG/DL (ref 8–23)
CALCIUM SERPL-MCNC: 9.9 MG/DL (ref 8.8–10.2)
CHLORIDE SERPL-SCNC: 103 MMOL/L (ref 98–107)
CREAT SERPL-MCNC: 0.69 MG/DL (ref 0.51–0.95)
CRP SERPL-MCNC: <3 MG/L
DEPRECATED HCO3 PLAS-SCNC: 27 MMOL/L (ref 22–29)
EGFRCR SERPLBLD CKD-EPI 2021: >90 ML/MIN/1.73M2
GLUCOSE SERPL-MCNC: 95 MG/DL (ref 70–99)
POTASSIUM SERPL-SCNC: 4.2 MMOL/L (ref 3.4–5.3)
PROT SERPL-MCNC: 7.3 G/DL (ref 6.4–8.3)
SODIUM SERPL-SCNC: 140 MMOL/L (ref 136–145)
TOTAL PROTEIN SERUM FOR ELP: 7.2 G/DL (ref 6.4–8.3)
TSH SERPL DL<=0.005 MIU/L-ACNC: 0.87 UIU/ML (ref 0.3–4.2)
VIT B12 SERPL-MCNC: 784 PG/ML (ref 232–1245)

## 2023-09-08 PROCEDURE — 36415 COLL VENOUS BLD VENIPUNCTURE: CPT

## 2023-09-08 PROCEDURE — 86140 C-REACTIVE PROTEIN: CPT

## 2023-09-08 PROCEDURE — 99213 OFFICE O/P EST LOW 20 MIN: CPT | Mod: 95 | Performed by: INTERNAL MEDICINE

## 2023-09-08 PROCEDURE — 80053 COMPREHEN METABOLIC PANEL: CPT

## 2023-09-08 PROCEDURE — 82607 VITAMIN B-12: CPT

## 2023-09-08 PROCEDURE — 84443 ASSAY THYROID STIM HORMONE: CPT

## 2023-09-08 PROCEDURE — 86038 ANTINUCLEAR ANTIBODIES: CPT

## 2023-09-08 PROCEDURE — 84165 PROTEIN E-PHORESIS SERUM: CPT

## 2023-09-08 PROCEDURE — 84155 ASSAY OF PROTEIN SERUM: CPT | Mod: 59

## 2023-09-08 NOTE — PATIENT INSTRUCTIONS
Call  [phone] or use [employer] to schedule a future lab appointment  fasting in the next 1 week (may do today if lab appt available).   For fasting labs, please refrain from eating for 8 hours or more.   Drink 2 glasses of water before your lab appointment. It is fine to take your  oral medications on the morning of the lab test as usual   If lab results normal, would then recommend EMG nerve conduction study of your legs that would be done at a neurology office. If needed, will defer order to Dr Hawthorne  If burning feeling becomes bothersome enough that you wish to use daily medication to lessen then symptoms (would be symptoms treatment, not for cure), then send note in Mychart and Gabapentin can be prescribed

## 2023-09-08 NOTE — PROGRESS NOTES
Melva is a 68 year old who is being evaluated via a billable telephone visit.      What phone number would you like to be contacted at? [phone]  How would you like to obtain your AVS? Future [employer]    Distant Location (provider location):  On-site    ASSESSMENT:   1. Neuropathy   Does not sound radicular. Labs  as ordered. Possible future EMG. Pt declines need for pain med at this time  - Comprehensive metabolic panel; Future  - Vitamin B12; Future  - CRP inflammation; Future  - Protein electrophoresis; Future  - TSH with free T4 reflex; Future  - Anti Nuclear Lorene IgG by IFA with Reflex; Future      PLAN:  Call  [phone] or use [employer] to schedule a future lab appointment  fasting in the next 1 week (may do today if lab appt available).   For fasting labs, please refrain from eating for 8 hours or more.   Drink 2 glasses of water before your lab appointment. It is fine to take your  oral medications on the morning of the lab test as usual   If lab results normal, would then recommend EMG nerve conduction study of your legs that would be done at a neurology office. If needed, will defer order to Dr Hawthorne  If burning feeling becomes bothersome enough that you wish to use daily medication to lessen then symptoms (would be symptoms treatment, not for cure), then send note in Mychart and Gabapentin can be prescribed      Phone call contact time  Call Started at 11:51am  Call Ended at 12:07pm  Total minutes: 16 min    (Chart documentation was completed, in part, with [employer] voice-recognition software. Even though reviewed, some grammatical, spelling, and word errors may remain.)    Antonio Iglesias MD  Internal Medicine Department  [facility]   Melva is a 68 year old, presenting for the following health issues:  NEUROPATHY (Burning sensation in feet)      HPI     Concern - hot and tingly feet  Onset: on going but worsened in the past three months  Description: hot and  tingly   Intensity: during the day mild but in the night gets very hot and is unable to sleep  Progression of Symptoms:  worsening  Accompanying Signs & Symptoms: big toe on left foot is a tiny bit numb on the tip of the toe  Previous history of similar problem: no  Precipitating factors:        Worsened by: n/a  Alleviating factors:        Improved by: n/a  Therapies tried and outcome: None     Most recent lab results reviewed with pt.        Sx bilateral in feet. No pain in LEs with ambulation. Denies back pain now. Occ burning feeling. No sx in UEs. Occ in thighs. No hx DM. Sx not bad enough that pt wishes to start medication therapy now for it     Additional ROS:   Constitutional, HEENT, Cardiovascular, Pulmonary, GI and , Neuro, MSK and Psych review of systems/symptoms are otherwise negative or unchanged from previous, except as noted above.           Objective:  Any reported vitals from today were reviewed in chart. See nursing documentation for details    RESP: No cough, no audible wheezing, able to talk in full sentences  GEN: Normal affect. Normal though content/speech  Remainder of exam unable to be completed due to telephone visits

## 2023-09-11 LAB
ALBUMIN SERPL ELPH-MCNC: 4.9 G/DL (ref 3.7–5.1)
ALPHA1 GLOB SERPL ELPH-MCNC: 0.3 G/DL (ref 0.2–0.4)
ALPHA2 GLOB SERPL ELPH-MCNC: 0.6 G/DL (ref 0.5–0.9)
ANA SER QL IF: NEGATIVE
B-GLOBULIN SERPL ELPH-MCNC: 0.6 G/DL (ref 0.6–1)
GAMMA GLOB SERPL ELPH-MCNC: 0.9 G/DL (ref 0.7–1.6)
M PROTEIN SERPL ELPH-MCNC: 0 G/DL
PROT PATTERN SERPL ELPH-IMP: NORMAL

## 2023-10-04 ENCOUNTER — IMMUNIZATION (OUTPATIENT)
Dept: INTERNAL MEDICINE | Facility: CLINIC | Age: 68
End: 2023-10-04
Payer: COMMERCIAL

## 2023-10-04 DIAGNOSIS — Z23 NEED FOR PROPHYLACTIC VACCINATION AND INOCULATION AGAINST INFLUENZA: Primary | ICD-10-CM

## 2023-10-04 PROCEDURE — G0008 ADMIN INFLUENZA VIRUS VAC: HCPCS

## 2023-10-04 PROCEDURE — 99207 PR NO CHARGE NURSE ONLY: CPT

## 2023-10-04 PROCEDURE — 90662 IIV NO PRSV INCREASED AG IM: CPT

## 2024-01-04 ENCOUNTER — OFFICE VISIT (OUTPATIENT)
Dept: INTERNAL MEDICINE | Facility: CLINIC | Age: 69
End: 2024-01-04
Payer: COMMERCIAL

## 2024-01-04 VITALS
HEART RATE: 91 BPM | OXYGEN SATURATION: 97 % | HEIGHT: 68 IN | DIASTOLIC BLOOD PRESSURE: 87 MMHG | BODY MASS INDEX: 24.04 KG/M2 | SYSTOLIC BLOOD PRESSURE: 129 MMHG | WEIGHT: 158.6 LBS | TEMPERATURE: 97.8 F

## 2024-01-04 DIAGNOSIS — R21 RASH: ICD-10-CM

## 2024-01-04 DIAGNOSIS — Z00.00 MEDICARE ANNUAL WELLNESS VISIT, SUBSEQUENT: Primary | ICD-10-CM

## 2024-01-04 DIAGNOSIS — Z13.220 SCREENING FOR CHOLESTEROL LEVEL: ICD-10-CM

## 2024-01-04 DIAGNOSIS — Z23 HIGH PRIORITY FOR 2019-NCOV VACCINE: ICD-10-CM

## 2024-01-04 DIAGNOSIS — E55.9 VITAMIN D DEFICIENCY: ICD-10-CM

## 2024-01-04 PROCEDURE — 99213 OFFICE O/P EST LOW 20 MIN: CPT | Mod: 25 | Performed by: INTERNAL MEDICINE

## 2024-01-04 PROCEDURE — G0438 PPPS, INITIAL VISIT: HCPCS | Performed by: INTERNAL MEDICINE

## 2024-01-04 RX ORDER — FLUOCINONIDE 0.5 MG/G
CREAM TOPICAL 2 TIMES DAILY
Qty: 30 G | Refills: 0 | Status: SHIPPED | OUTPATIENT
Start: 2024-01-04 | End: 2024-05-02

## 2024-01-04 ASSESSMENT — ENCOUNTER SYMPTOMS
PARESTHESIAS: 1
FREQUENCY: 0
COUGH: 0
EYE PAIN: 0
CHILLS: 0
JOINT SWELLING: 0
HEMATURIA: 0
HEADACHES: 0
HEMATOCHEZIA: 0
FEVER: 0
WEAKNESS: 0
CONSTIPATION: 0
ABDOMINAL PAIN: 0
DYSURIA: 0
DIZZINESS: 0
HEARTBURN: 0
PALPITATIONS: 0
ARTHRALGIAS: 0
BREAST MASS: 0
SHORTNESS OF BREATH: 0
NERVOUS/ANXIOUS: 0
NAUSEA: 0
SORE THROAT: 0
MYALGIAS: 0
DIARRHEA: 0

## 2024-01-04 ASSESSMENT — ACTIVITIES OF DAILY LIVING (ADL): CURRENT_FUNCTION: NO ASSISTANCE NEEDED

## 2024-01-04 NOTE — PROGRESS NOTES
ASSESSMENT/PLAN                                                       (Z00.00) Medicare annual wellness visit, subsequent  (primary encounter diagnosis)  Comment: PMH, PSH, FH, SH, medications, allergies, immunizations, and preventative health measures reviewed and updated as appropriate.  Plan: see below for plans.      (Z13.220) Screening for cholesterol level  (E55.9) Vitamin D deficiency  Plan: fasting labs ordered - patient to schedule.     (R21) Rash  Plan: TRIAL of Lidex cream for suspected dyshidrotic eczema or plaque psoriasis; if symptoms worsen, change, or do not improve, patient to contact MD.      Appropriate preventive services were discussed with this patient, including applicable screening as appropriate for cardiovascular disease, diabetes, osteopenia/osteoporosis, and glaucoma.  As appropriate for age/gender, discussed screening for colorectal cancer, prostate cancer, breast cancer, and cervical cancer. Checklist reviewing preventive services available has been given to the patient.    Reviewed patients plan of care. The Basic Care Plan (routine screening as documented in Health Maintenance) for Melva Saleh meets the Care Plan requirement. This Care Plan has been established and reviewed with the Patient.    Makayla Hawthorne MD   [address] [address]  T: [phone], F: [phone]    SUBJECTIVE                                                      Melva Saleh is a very pleasant 68 year old female who presents for her subsequent AWV:    Current providers (other than myself): n/a     PMH, PSH, FH, SH, medications, allergies, immunizations, preventative health, and health risk assessment reviewed and updated as appropriate.    Past Medical History:   Diagnosis Date    BALBIR (generalized anxiety disorder)      Past Surgical History:   Procedure Laterality Date    COLONOSCOPY N/A 10/1/2018    Procedure: COLONOSCOPY;  colonoscopy;  Surgeon: Kendra Olson  MD Rubio;  Location:  GI    RETINAL REATTACHMENT Right 2020    TONSILLECTOMY & ADENOIDECTOMY  as a child     Family History   Problem Relation Age of Onset    Coronary Artery Disease Father         s/p PCI (stent) later in life    Breast Cancer Paternal Grandmother     Myocardial Infarction Paternal Grandfather         later in life    Breast Cancer Maternal Aunt     Diabetes Type 2  Paternal Aunt     Cerebrovascular Disease No family hx of     Ovarian Cancer No family hx of     Colon Cancer No family hx of      Social History     Occupational History    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    3 adult children - live in area.    6 grandchildren (as of 2024).    No formal exercise.      Allergies   Allergen Reactions    Amoxicillin-Pot Clavulanate Difficulty breathing    Erythromycin Nausea    Penicillins Hives     Current Outpatient Medications   Medication Sig    clobetasol propionate (CLOBEX) 0.05 % external shampoo Lather into scalp daily as needed. Leave on for 5 minutes. Rinse. May use conditioner afterwards.    fluocinonide (LIDEX) 0.05 % external cream Apply topically 2 times daily     Immunization History   Administered Date(s) Administered    COVID-19 Bivalent 18+ (Moderna) 10/20/2022    COVID-19 Monovalent 18+ (Moderna) 03/09/2021, 04/06/2021    COVID-19 Monovalent Booster 18+ (Moderna) 11/11/2021    HEPA 12/30/2009, 05/06/2010, 11/09/2012    HepB 10/30/2009, 12/04/2009, 05/06/2010    Influenza (IIV3) PF 12/04/2009, 10/27/2010, 10/10/2012    Influenza Vaccine 18-64 (Flublok) 10/08/2018, 10/04/2019, 10/13/2020    Influenza Vaccine 65+ (Fluzone HD) 10/15/2021, 10/20/2022, 10/04/2023    Influenza  "Vaccine >6 months,quad, PF 10/26/2013, 10/16/2014, 10/01/2015, 11/16/2017    Influenza Vaccine, 6+MO IM (QUADRIVALENT W/PRESERVATIVES) 10/27/2016    Meningococcal (Menomune ) 10/30/2009    Meningococcal ACWY (Menactra ) 12/22/2015    Pneumococcal 20 valent Conjugate (Prevnar 20) 02/02/2023    Poliovirus, inactivated (IPV) 11/09/2012    TD,PF 7+ (Tenivac) 06/22/1998    TDAP (Adacel,Boostrix) 08/19/2008    TDAP Vaccine (Adacel) 08/19/2008, 10/08/2018    Typhoid Oral 12/22/2015    Zoster recombinant adjuvanted (SHINGRIX) 02/02/2023, 04/10/2023    Zoster vaccine, live 10/27/2016     PREVENTATIVE HEALTH                                                      BMI: within normal limits   Blood pressure: within normal limits   Breast CA screening: up to date   Colon CA screening: up to date   Lung CA screening: n/a   Dexa: up to date   Screening cholesterol: DUE  Screening diabetes: up to date   Alcohol misuse screening: alcohol use reviewed - no intervention indicated at this time  Immunizations: reviewed;  COVID-19 booster DUE - patient declines    HEALTH RISK ASSESSMENT                                                      In general, how would you rate your overall physical health? excellent  Outside of work, how many days during the week do you exercise? 1 day/week  Outside of work, approximately how many minutes a day do you exercise? 15-30 minutes    If you drink alcohol do you typically have >3 drinks per day or >7 drinks per week? No  Do you usually eat at least 4 servings of fruit and vegetables a day, include whole grains & fiber and avoid regularly eating high fat or \"junk\" foods? Yes     Do you have any problems taking medications regularly? No  Do you have any side effects from medications? No    Assistance with daily activities: No    Safety concerns: YES - throw rugs in the hallway, lack of grab bars in the bathroom    Fall risk assessment: completed today (see ambulatory assessments)    Hearing concerns: " "No    In the past 6 months, have you been bothered by leaking of urine: No    In general, how would you rate your overall mental or emotional health: good    Do you have a current opioid prescription? No  Do you use any other controlled substances or medications that are not prescribed by a provider? None    PHQ-2/PHQ-9 assessment: completed today (see ambulatory assessments)    Additional concerns today: YES - recurrent rash on palms of hands often resulting in cracked skin; occasionally very itchy    OBJECTIVE                                                      /87   Pulse 91   Temp 97.8  F (36.6  C) (Temporal)   Ht 1.721 m (5' 7.75\")   Wt 71.9 kg (158 lb 9.6 oz)   LMP  (LMP Unknown)   SpO2 97%   BMI 24.29 kg/m    Constitutional: well-appearing  Head, Ears, and Eyes: normocephalic; normal external auditory canal and pinna; tympanic membranes visualized and normal; normal lids and conjunctivae  Neck: supple, symmetric, no thyromegaly or lymphadenopathy  Respiratory: normal respiratory effort; clear to auscultation bilaterally  Cardiovascular: regular rate and rhythm; no edema  Gastrointestinal: soft, non-tender, and non-distended; no organomegaly or masses  Musculoskeletal: normal gait and station  Psych: normal judgment and insight; normal mood and affect; recent and remote memory intact  Integumentary: multiple, well-circumscribed erythematous macules and patches with thick overlying scale and cracks over gallagher side of hands and fingers    Cognitive impairment noted: No  ---  (Note was completed, in part, with [employer] voice-recognition software. Documentation was reviewed, but some grammatical, spelling, and word errors may remain.)  "

## 2024-05-01 DIAGNOSIS — R21 RASH: ICD-10-CM

## 2024-05-01 NOTE — TELEPHONE ENCOUNTER
Medication Question or Refill    Contacts         Type Contact Phone/Fax    05/01/2024 02:54 PM CDT Phone (Incoming) Melva Saleh (Self) [phone] (M)            What medication are you calling about (include dose and sig)?: fluocinonide (LIDEX) 0.05 % external cream    Preferred Pharmacy:   [place] [address] [place] [facility] [place] [id number]  [address] [facility] [address]  Phone: [phone] Fax: [phone]      Controlled Substance Agreement on file:   CSA -- Patient Level:    CSA: None found at the patient level.       Who prescribed the medication?: Makayla Hawthorne MD     Do you need a refill? Yes    When did you use the medication last? Yesterday    Patient offered an appointment? No    Do you have any questions or concerns?  No, patient is seeing dermatology in July      Could we send this information to you in [facility] or would you prefer to receive a phone call?:   No preference, would prefer a text if possible  Okay to leave a detailed message?: Yes at Cell number on file:    Telephone Information:   Mobile [phone]

## 2024-05-02 RX ORDER — FLUOCINONIDE 0.5 MG/G
CREAM TOPICAL 2 TIMES DAILY
Qty: 30 G | Refills: 0 | Status: SHIPPED | OUTPATIENT
Start: 2024-05-02

## 2024-05-02 RX ORDER — FLUOCINONIDE 0.5 MG/G
CREAM TOPICAL 2 TIMES DAILY
Qty: 60 G | Refills: 0 | OUTPATIENT
Start: 2024-05-02

## 2024-05-02 NOTE — TELEPHONE ENCOUNTER
60g (which is pretty large tube) was sent in in January - if patient has gone through all of that in 3 months that is concerning. She may need to see dermatology for further evaluation if she is requiring that much cream/frequent application. Steroid cream really shouldn't be used long term. Just as needed, short-term (no more than 2-3 weeks at a time).

## 2024-05-02 NOTE — TELEPHONE ENCOUNTER
Patient states the tube is 30 g and is not a large tube. She still has some product left in it, but not much.  She is using the cream once per week, sometimes less.     Patient is aware the next step is to see Dermatology. She has an appointment scheduled for July, she was not able to be seen sooner.     Patient states 1 more tube of cream will easily get her through till she is seen in July.            Disp Refills Start End SIN   fluocinonide (LIDEX) 0.05 % external cream 30 g 0 1/4/2024 -- No   Sig - Route: Apply topically 2 times daily - Topical   Sent to pharmacy as: Fluocinonide 0.05 % External Cream (LIDEX)   Class: E-Prescribe   Order: [id number]   E-Prescribing Status: Receipt confirmed by pharmacy (1/4/2024 11:38 AM CST)

## 2024-07-18 ENCOUNTER — OFFICE VISIT (OUTPATIENT)
Dept: DERMATOLOGY | Facility: CLINIC | Age: 69
End: 2024-07-18
Payer: COMMERCIAL

## 2024-07-18 DIAGNOSIS — L30.9 DERMATITIS: Primary | ICD-10-CM

## 2024-07-18 PROCEDURE — 99203 OFFICE O/P NEW LOW 30 MIN: CPT | Performed by: DERMATOLOGY

## 2024-07-18 RX ORDER — CLOBETASOL PROPIONATE 0.5 MG/ML
SOLUTION TOPICAL 2 TIMES DAILY
Qty: 100 ML | Refills: 6 | Status: SHIPPED | OUTPATIENT
Start: 2024-07-18

## 2024-07-18 RX ORDER — BETAMETHASONE DIPROPIONATE 0.5 MG/G
CREAM TOPICAL 2 TIMES DAILY
Qty: 100 G | Refills: 6 | Status: SHIPPED | OUTPATIENT
Start: 2024-07-18

## 2024-07-18 NOTE — LETTER
7/18/2024      Melva Saleh  [address] [facility] [address]      Dear Colleague,    Thank you for referring your patient, Melva Saleh, to the [facility]. Please see a copy of my visit note below.    Melva Saleh is an extremely pleasant 69 year old year old female patient I was asked to see by Dr. Hawthorne for for rash on hands and scalp.  .   Patient states this has been present for a while.  Patient reports the following symptoms:  itching.  Patient reports the following previous treatments topicals on hands.  .  Patient reports the following modifying factors none.  Associated symptoms: none.  Patient has no other skin complaints today.  Remainder of the HPI, Meds, PMH, Allergies, FH, and SH was reviewed in chart.      Past Medical History:   Diagnosis Date     BALBIR (generalized anxiety disorder)        Past Surgical History:   Procedure Laterality Date     COLONOSCOPY N/A 10/1/2018    Procedure: COLONOSCOPY;  colonoscopy;  Surgeon: Kendra Olson MD;  Location:  GI     RETINAL REATTACHMENT Right 2020     TONSILLECTOMY & ADENOIDECTOMY  as a child        Family History   Problem Relation Age of Onset     Skin Cancer Mother      Coronary Artery Disease Father         s/p PCI (stent) later in life     Breast Cancer Paternal Grandmother      Myocardial Infarction Paternal Grandfather         later in life     Breast Cancer Maternal Aunt      Diabetes Type 2  Paternal Aunt      Cerebrovascular Disease No family hx of      Ovarian Cancer No family hx of      Colon Cancer No family hx of        Social History     Socioeconomic History     Marital status:      Spouse name: Not on file     Number of children: Not on file     Years of education: Not on file     Highest education level: Not on file   Occupational History     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 No      Service Not Asked     Blood Transfusions Not Asked     Caffeine Concern Not Asked     Occupational Exposure Not Asked     Hobby Hazards Not Asked     Sleep Concern Not Asked     Stress Concern Not Asked     Weight Concern Not Asked     Special Diet Not Asked     Back Care Not Asked     Exercise Not Asked     Bike Helmet Yes     Seat Belt Yes     Self-Exams Not Asked   Social History Narrative    .    3 adult children - live in area.    6 grandchildren (as of 2024).    No formal exercise.      Social Determinants of Health     Financial Resource Strain: Low Risk  (1/4/2024)    Financial Resource Strain      Within the past 12 months, have you or your family members you live with been unable to get utilities (heat, electricity) when it was really needed?: No   Food Insecurity: Low Risk  (1/4/2024)    Food Insecurity      Within the past 12 months, did you worry that your food would run out before you got money to buy more?: No      Within the past 12 months, did the food you bought just not last and you didn t have money to get more?: No   Transportation Needs: Low Risk  (1/4/2024)    Transportation Needs      Within the past 12 months, has lack of transportation kept you from medical appointments, getting your medicines, non-medical meetings or appointments, work, or from getting things that you need?: No   Physical Activity: Insufficiently Active (11/8/2021)    Exercise Vital Sign      Days of Exercise per Week: 1 day      Minutes of Exercise per Session: 60 min   Stress: No Stress Concern Present (11/8/2021)    [place] [employer] Occupational [employer] - Occupational Stress Questionnaire       Feeling of Stress : Only a little   Social Connections: Socially Integrated (11/8/2021)    Social Connection and Isolation Panel [NHANES]      Frequency of Communication with Friends and Family: Three times a week      Frequency of Social Gatherings with Friends and Family: Once a week      Attends [employer] Services: More than 4 times per year      Active Member of Clubs or Organizations: Yes      Attends Club or Organization Meetings: Not on file      Marital Status:    Interpersonal Safety: Low Risk  (1/4/2024)    Interpersonal Safety      Do you feel physically and emotionally safe where you currently live?: Yes      Within the past 12 months, have you been hit, slapped, kicked or otherwise physically hurt by someone?: No      Within the past 12 months, have you been humiliated or emotionally abused in other ways by your partner or ex-partner?: No   Housing Stability: Low Risk  (1/4/2024)    Housing Stability      Do you have housing? : Yes      Are you worried about losing your housing?: No       Outpatient Encounter Medications as of 7/18/2024   Medication Sig Dispense Refill     clobetasol propionate (CLOBEX) 0.05 % external shampoo Lather into scalp daily as needed. Leave on for 5 minutes. Rinse. May use conditioner afterwards. 118 mL 1     fluocinonide (LIDEX) 0.05 % external cream Apply topically 2 times daily 30 g 0     No facility-administered encounter medications on file as of 7/18/2024.             O:   NAD, WDWN, Alert & Oriented, Mood & Affect wnl, Vitals stable   General appearance normal   Vitals stable   Alert, oriented and in no acute distress     Post scalp scaly red plaque  BL hands fissured red scaly plaques      Eyes: Conjunctivae/lids:Normal     ENT: Lips, mucosa: normal    MSK:Normal    Cardiovascular: peripheral edema none    Pulm: Breathing Normal    Neuro/Psych: Orientation:Alert and Orientedx3 ; Mood/Affect:normal       A/P:  Eczema v Psoriasis  Pathophysiology discussed with  pateint   Topicals, orals, injections discussed with patient   Betamethasone for hands  Olux foam for scalp   Return to clinic 3 months  It was a pleasure speaking to Mevla Saleh today.  Previous clinic notes and pertinent laboratory tests were reviewed prior to Melva Saleh's visit.  Skin care regimen reviewed with patient: Eliminate harsh soaps, i.e. Dial, zest, irsih spring; Mild soaps such as Cetaphil or Dove sensitive skin, avoid hot or cold showers, aggressive use of emollients including vanicream, cetaphil or cerave discussed with patient.        Again, thank you for allowing me to participate in the care of your patient.        Sincerely,        Valdo aPl MD

## 2024-08-05 ENCOUNTER — ANCILLARY PROCEDURE (OUTPATIENT)
Dept: GENERAL RADIOLOGY | Facility: CLINIC | Age: 69
End: 2024-08-05
Attending: PHYSICIAN ASSISTANT
Payer: COMMERCIAL

## 2024-08-05 ENCOUNTER — OFFICE VISIT (OUTPATIENT)
Dept: URGENT CARE | Facility: URGENT CARE | Age: 69
End: 2024-08-05
Payer: COMMERCIAL

## 2024-08-05 ENCOUNTER — NURSE TRIAGE (OUTPATIENT)
Dept: INTERNAL MEDICINE | Facility: CLINIC | Age: 69
End: 2024-08-05

## 2024-08-05 VITALS
OXYGEN SATURATION: 96 % | TEMPERATURE: 97.5 F | HEART RATE: 94 BPM | DIASTOLIC BLOOD PRESSURE: 84 MMHG | SYSTOLIC BLOOD PRESSURE: 130 MMHG

## 2024-08-05 DIAGNOSIS — M25.512 ACUTE PAIN OF LEFT SHOULDER: Primary | ICD-10-CM

## 2024-08-05 DIAGNOSIS — M25.512 ACUTE PAIN OF LEFT SHOULDER: ICD-10-CM

## 2024-08-05 DIAGNOSIS — M75.52 ACUTE SHOULDER BURSITIS, LEFT: ICD-10-CM

## 2024-08-05 DIAGNOSIS — M25.519 ACUTE SHOULDER PAIN, UNSPECIFIED LATERALITY: Primary | ICD-10-CM

## 2024-08-05 PROCEDURE — 99214 OFFICE O/P EST MOD 30 MIN: CPT | Performed by: PHYSICIAN ASSISTANT

## 2024-08-05 PROCEDURE — 73030 X-RAY EXAM OF SHOULDER: CPT | Mod: TC | Performed by: RADIOLOGY

## 2024-08-05 RX ORDER — HYDROCODONE BITARTRATE AND ACETAMINOPHEN 5; 325 MG/1; MG/1
.5-1 TABLET ORAL
Qty: 10 TABLET | Refills: 0 | Status: SHIPPED | OUTPATIENT
Start: 2024-08-05

## 2024-08-05 RX ORDER — CELECOXIB 200 MG/1
200 CAPSULE ORAL DAILY
Qty: 30 CAPSULE | Refills: 0 | Status: SHIPPED | OUTPATIENT
Start: 2024-08-05

## 2024-08-05 NOTE — TELEPHONE ENCOUNTER
"Nurse Triage SBAR    Is this a 2nd Level Triage? NO    Situation: Pt called the clinic stating she is having left shoulder \"pinches\" for 3 weeks which have been worsening.     Background: Pt states she plays guitar, but has not had any improvement since doing this activity less often.     Assessment: Pt declines any weakness, numbness, or tingling. She also declines a fever, chest pain, or SOB. She states that more frequently for the past week she has felt \"pinching\" in her left shoulder which is worse with certain movements or with sitting for longer periods of time. It has begun to affect her sleep. She feels it may be related to nerves in her shoulder, or her rotator cuff.     Protocol Recommended Disposition:   See in Office Within 2 Weeks    Recommendation: [facility] and [facility] with pt as she is hoping to be seen within the next day or two and there is not availability at the clinic currently.   Pt would like PCP's advice on how to proceed, but states she will plan to go to [facility] tomorrow.     Routed to provider    Does the patient meet one of the following criteria for ADS visit consideration? 16+ years old, with an [facility] PCP     TIP  Providers, please consider if this condition is appropriate for management at one of our Acute and Diagnostic Services sites.     If patient is a good candidate, please use dotphrase <dot>triageresponse and select Refer to ADS to document.    Reason for Disposition   MILD pain (e.g., does not interfere with normal activities) and present > 7 days    Additional Information   Negative: Shock suspected (e.g., cold/pale/clammy skin, too weak to stand, low BP, rapid pulse)   Negative: Similar pain previously and it was from 'heart attack'   Negative: Similar pain previously and it was from 'angina' and not relieved by nitroglycerin   Negative: Sounds like a life-threatening emergency to the triager   Negative: Chest pain   Negative: Followed an injury to shoulder   " "Negative: Difficulty breathing or unusual sweating (e.g., sweating without exertion)   Negative: Pain lasting > 5 minutes and pain also present in chest  (Exception: Pain is clearly made worse by movement.)   Negative: Age > 40 and no obvious cause and pain even when not moving the arm  (Exception: Pain is clearly made worse by moving arm or bending neck.)   Negative: Red area or streak and fever   Negative: Swollen joint and fever   Negative: Entire arm is swollen   Negative: Patient sounds very sick or weak to the triager   Negative: SEVERE pain (e.g., excruciating, unable to do any normal activities)   Negative: Shoulder pains with exertion (e.g., walking) and pain goes away on resting and not present now   Negative: Painful rash with multiple small blisters grouped together (i.e., dermatomal distribution or 'band' or 'stripe')   Negative: Looks like a boil, infected sore, deep ulcer or other infected rash (spreading redness, pus)   Negative: Localized rash is very painful (no fever)   Negative: Weakness (i.e., loss of strength) in hand or fingers  (Exception: Not truly weak; hand feels weak because of pain.)   Negative: Numbness (i.e., loss of sensation) in hand or fingers   Negative: Unable to use arm at all and because of shoulder pain or stiffness   Negative: Patient wants to be seen   Negative: MODERATE pain (e.g., interferes with normal activities) and present > 3 days   Negative: Pain is worsened or caused by bending the neck    Answer Assessment - Initial Assessment Questions  1. ONSET: \"When did the pain start?\"      3 weeks ago, last 5 days has been more consistent   2. LOCATION: \"Where is the pain located?\"      Left shoulder   3. PAIN: \"How bad is the pain?\" (Scale 1-10; or mild, moderate, severe)    - MILD (1-3): doesn't interfere with normal activities    - MODERATE (4-7): interferes with normal activities (e.g., work or school) or awakens from sleep    - SEVERE (8-10): excruciating pain, unable to " "do any normal activities, unable to move arm at all due to pain      \"Pinching\" feeling  No pain unless moving certain ways   4. WORK OR EXERCISE: \"Has there been any recent work or exercise that involved this part of the body?\"      Plays guitar   5. CAUSE: \"What do you think is causing the shoulder pain?\"      Nerve pain  6. OTHER SYMPTOMS: \"Do you have any other symptoms?\" (e.g., neck pain, swelling, rash, fever, numbness, weakness)      None  7. PREGNANCY: \"Is there any chance you are pregnant?\" \"When was your last menstrual period?\"      N/A    Protocols used: Shoulder Pain-A-OH  Thank you,  Reyna Rivera RN    "

## 2024-08-05 NOTE — TELEPHONE ENCOUNTER
In the absence of trauma, injury, or unusual exertion, I would recommend a trial of physical therapy. Referral placed - patient will be contacted to schedule.

## 2024-08-06 NOTE — TELEPHONE ENCOUNTER
Triage RN called and left detailed VM for the patient with the provider's response along with the phone number to call to get an appointment scheduled with PT.     Upon chart review, patient was also seen in UC yesterday and was given some medications along with a referral for Ortho.     Referral Details       Referred By   Referred To    Makayla Hawthorne MD   [address] [place] [id number]   Phone: [phone]   Fax: [phone]    Diagnoses: Acute shoulder pain, unspecified laterality   Order: Physical Therapy  Referral        Comment: Please be aware that coverage of these services is subject to the terms and limitations of your health insurance plan.  Call member services at your health plan with any benefit or coverage questions.    [facility] will call you to coordinate your care as prescribed by your provider. If you don't hear from a representative within 2 business days, please call [phone].          Advised patient to call the clinic back with any additional questions and/or concerns.     Kathy Morris RN

## 2024-08-06 NOTE — PROGRESS NOTES
Assessment & Plan     Acute pain of left shoulder    You can hurt your shoulder by using it too much during an activity, such as fishing or baseball. It can also happen as part of the everyday wear and tear of getting older. Shoulder injuries can be slow to heal, but your shoulder should get better with time.  Your doctor may recommend a sling to rest your shoulder. If you have injured your shoulder, you may need testing and treatment.    Sling for comfort  Shoulder exercises  Start on celebrex daily for inflammation and pain  Vicodin for night time pain    - XR Shoulder Left G/E 3 Views  - HYDROcodone-acetaminophen (NORCO) 5-325 MG tablet  Dispense: 10 tablet; Refill: 0    Acute shoulder bursitis, left    Bursitis is inflammation of the bursa. A bursa is a small sac of fluid that cushions a joint and helps it move easily. A bursa sits under the highest point of your shoulder. You can get bursitis by overusing your shoulder, which can happen with activities such as lifting, pitching a ball, or painting. Symptoms of bursitis include pain when you move your arm. Your arm may hurt when you try to lift it, and the pain can reach down the side of your arm. You may have trouble sleeping because of the pain.  Bursitis usually gets better if you avoid the activity that caused it. If pain lasts or gets worse despite home treatment, your doctor may draw fluid from the bursa through a needle. This may relieve your pain and help your doctor know if you have an infection    Ice compresses  Sling for comfort  Start on celebrex  Vicodin for pain  - celecoxib (CELEBREX) 200 MG capsule  Dispense: 30 capsule; Refill: 0  - Orthopedic  Referral         Referral to Orthopedics    No follow-ups on file.    Faustino Machuca, [facility], PA-C  [facility] YARIEL Frye is a 69 year old female who presents to clinic today for the following health issues:  Chief Complaint   Patient presents with    Urgent  Care     Pt presents with left shoulder discomfort especially when lifting heavy objects and sometimes sleeping X 3 weeks.       HPI  Review of Systems  Constitutional, HEENT, cardiovascular, pulmonary, gi and gu systems are negative, except as otherwise noted.      Objective    /84   Pulse 94   Temp 97.5  F (36.4  C) (Tympanic)   LMP  (LMP Unknown)   SpO2 96%   Physical Exam   GENERAL: alert and no distress  NECK: no adenopathy, no asymmetry, masses, or scars  RESP: lungs clear to auscultation - no rales, rhonchi or wheezes  CV: regular rate and rhythm, normal S1 S2, no S3 or S4, no murmur, click or rub, no peripheral edema  MS: pos for left lateral shoulder and upper deltoid tenderness. DROM of shoulder  SKIN: no suspicious lesions or rashes  NEURO: Normal strength and tone, mentation intact and speech normal  PSYCH: mentation appears normal, affect normal/bright      Shoulder xray Negative for acute findings, read by Faustino COSTELLO at time of visit.

## 2024-08-09 NOTE — PROGRESS NOTES
PHYSICAL THERAPY EVALUATION  Type of Visit: Evaluation       Fall Risk Screen:  Fall screen completed by: PT  Have you fallen 2 or more times in the past year?: No  Have you fallen and had an injury in the past year?: No  Is patient a fall risk?: No    Subjective  Pt. complains of left shoulder pain that has been present for 1 month.  Mechanism/History of injury/symptoms: unknown.   Patient s chief complaints: left shoulder pain.  Symptoms are exacerbated by and sleeping.  Symptoms are relieved by rest.   Current function restrictions: Playing Zipalongr and sleeping.  Previous functional status as reported by patient: Unlimited.        Presenting condition or subjective complaint: pain in shoulder when weight bearing and sitting or lying down  Date of onset: 07/12/24    Relevant medical history: Arthritis; Pain at night or rest   Dates & types of surgery: retina surgery both eyes cataract surgery one eye    Prior diagnostic imaging/testing results: X-ray     Prior therapy history for the same diagnosis, illness or injury: No      Prior Level of Function  Transfers: Independent  Ambulation: Independent  ADL: Independent  IADL:     Living Environment  Social support: With family members   Type of home: House; 1 level; Basement   Stairs to enter the home: Yes 2 Is there a railing: No     Ramp: No   Stairs inside the home: Yes 12 Is there a railing: Yes     Help at home: None  Equipment owned:       Employment: No    Hobbies/Interests:      Patient goals for therapy: hold things out to the side such as guitar neck and also rest comfotably    Pain assessment: Pain present 4/10      Objective   Negative cervical screening today  Bilateral shoulder active range of motion within functional limits and symmetrical  Bilateral shoulder passive range of motion within normal limits and symmetrical  Type left posterior capsule  Positive impingement screening on the left shoulder  Right shoulder strength 4+ out of 5 for all  movements  Left shoulder strength: Flexion 4 out of 5, abduction 4 out of 5, external rotation 4 out of 5, internal rotation 4 out of 5  No palpable tenderness noted    Assessment & Plan   CLINICAL IMPRESSIONS  Medical Diagnosis: L Shoulder pain    Treatment Diagnosis: L Shoulder pain   Impression/Assessment: Patient is a 69 year old female with left shoulder complaints.  The following significant findings have been identified: Pain, Decreased ROM/flexibility, Decreased strength, Impaired muscle performance, and Decreased activity tolerance. These impairments interfere with their ability to perform self care tasks, recreational activities, and household chores as compared to previous level of function.     Clinical Decision Making (Complexity):  Clinical Presentation: Stable/Uncomplicated  Clinical Presentation Rationale: based on medical and personal factors listed in PT evaluation  Clinical Decision Making (Complexity): Low complexity    PLAN OF CARE  Treatment Interventions:  Interventions: Manual Therapy, Neuromuscular Re-education, Therapeutic Activity, Therapeutic Exercise    Long Term Goals     PT Goal 1  Goal Description: Pt will be able to reach above shoulder height with pain 0/10  Rationale: to maximize safety and independence with performance of ADLs and functional tasks;to maximize safety and independence within the home;to maximize safety and independence within the community  Target Date: 09/23/24  PT Goal 2  Goal Description: Pt will be able to sleep 75% of the night.  Rationale: to maximize safety and independence with performance of ADLs and functional tasks  Target Date: 09/23/24      Frequency of Treatment: 1 x week  Duration of Treatment: 8 week    Recommended Referrals to Other Professionals:   Education Assessment:   Learner/Method: Patient;Listening;Demonstration;Pictures/Video  Education Comments: Pt educated on plan of care    Risks and benefits of evaluation/treatment have been explained.    Patient/Family/caregiver agrees with Plan of Care.     Evaluation Time:     PT Eval, Low Complexity Minutes (72811): 15       Signing Clinician: Bart Psoadas PT        [facility]                                                                                   OUTPATIENT PHYSICAL THERAPY      PLAN OF TREATMENT FOR OUTPATIENT REHABILITATION   Patient's Last Name, First Name, Melva Clinton YOB: 1955   Provider's Name   [facility]   Medical Record No.  [id number]     Onset Date: 07/12/24  Start of Care Date: 08/12/24     Medical Diagnosis:  L Shoulder pain      PT Treatment Diagnosis:  L Shoulder pain Plan of Treatment  Frequency/Duration: 1 x week/ 8 week    Certification date from 08/12/24 to 09/23/24         See note for plan of treatment details and functional goals     Bart Posadas, PT                         I CERTIFY THE NEED FOR THESE SERVICES FURNISHED UNDER        THIS PLAN OF TREATMENT AND WHILE UNDER MY CARE     (Physician attestation of this document indicates review and certification of the therapy plan).              Referring Provider:  Makayla Hawthorne    Initial Assessment  See Epic Evaluation- Start of Care Date: 08/12/24

## 2024-08-12 ENCOUNTER — THERAPY VISIT (OUTPATIENT)
Dept: PHYSICAL THERAPY | Facility: CLINIC | Age: 69
End: 2024-08-12
Attending: INTERNAL MEDICINE
Payer: COMMERCIAL

## 2024-08-12 DIAGNOSIS — M25.519 ACUTE SHOULDER PAIN, UNSPECIFIED LATERALITY: ICD-10-CM

## 2024-08-12 DIAGNOSIS — M25.512 CHRONIC LEFT SHOULDER PAIN: Primary | ICD-10-CM

## 2024-08-12 DIAGNOSIS — G89.29 CHRONIC LEFT SHOULDER PAIN: Primary | ICD-10-CM

## 2024-08-12 PROCEDURE — 97110 THERAPEUTIC EXERCISES: CPT | Mod: GP | Performed by: PHYSICAL THERAPIST

## 2024-08-12 PROCEDURE — 97161 PT EVAL LOW COMPLEX 20 MIN: CPT | Mod: GP | Performed by: PHYSICAL THERAPIST

## 2024-08-12 ASSESSMENT — ACTIVITIES OF DAILY LIVING (ADL)
WHEN_LYING_ON_THE_INVOLVED_SIDE: 6
PLEASE_INDICATE_YOR_PRIMARY_REASON_FOR_REFERRAL_TO_THERAPY:: SHOULDER
REACHING_FOR_SOMETHING_ON_A_HIGH_SHELF: 0
PUSHING_WITH_THE_INVOLVED_ARM: 4
TOUCHING_THE_BACK_OF_YOUR_NECK: 0
AT_ITS_WORST?: 6

## 2024-08-26 ENCOUNTER — THERAPY VISIT (OUTPATIENT)
Dept: PHYSICAL THERAPY | Facility: CLINIC | Age: 69
End: 2024-08-26
Payer: COMMERCIAL

## 2024-08-26 DIAGNOSIS — G89.29 CHRONIC LEFT SHOULDER PAIN: Primary | ICD-10-CM

## 2024-08-26 DIAGNOSIS — M25.512 CHRONIC LEFT SHOULDER PAIN: Primary | ICD-10-CM

## 2024-08-26 PROCEDURE — 97110 THERAPEUTIC EXERCISES: CPT | Mod: GP | Performed by: PHYSICAL THERAPIST

## 2024-10-09 ENCOUNTER — TELEPHONE (OUTPATIENT)
Dept: DERMATOLOGY | Facility: CLINIC | Age: 69
End: 2024-10-09

## 2024-10-09 ENCOUNTER — OFFICE VISIT (OUTPATIENT)
Dept: DERMATOLOGY | Facility: CLINIC | Age: 69
End: 2024-10-09
Payer: COMMERCIAL

## 2024-10-09 DIAGNOSIS — D48.9 NEOPLASM OF UNCERTAIN BEHAVIOR: ICD-10-CM

## 2024-10-09 PROCEDURE — 11102 TANGNTL BX SKIN SINGLE LES: CPT | Performed by: NURSE PRACTITIONER

## 2024-10-09 PROCEDURE — 88305 TISSUE EXAM BY PATHOLOGIST: CPT | Performed by: DERMATOLOGY

## 2024-10-09 PROCEDURE — 88341 IMHCHEM/IMCYTCHM EA ADD ANTB: CPT | Performed by: DERMATOLOGY

## 2024-10-09 PROCEDURE — 88342 IMHCHEM/IMCYTCHM 1ST ANTB: CPT | Performed by: DERMATOLOGY

## 2024-10-09 NOTE — TELEPHONE ENCOUNTER
Patient Contact    Attempt # 2    Was call answered?  No.  Left message on voicemail with information to call nurse back at [phone].    Carol GRANDA RN  [facility] [place] [place]  [phone]

## 2024-10-09 NOTE — PATIENT INSTRUCTIONS
Proper skin care from [facility]:    -Eliminate harsh soaps as they strip the natural oils from the skin, often resulting in dry itchy skin ( i.e. Dial, Zest, [place])  -Use mild soaps such as Cetaphil or Dove Sensitive Skin in the shower. You do not need to use soap on arms, legs, and trunk every time you shower unless visibly soiled.   -Avoid hot or cold showers.  -After showering, lightly dry off and apply moisturizing within 2-3 minutes. This will help trap moisture in the skin.   -Aggressive use of a moisturizer at least 1-2 times a day to the entire body (including -Vanicream, Cetaphil, Aquaphor or Cerave) and moisturize hands after every washing.  -We recommend using moisturizers that come in a tub that needs to be scooped out, not a pump. This has more of an oil base. It will hold moisture in your skin much better than a water base moisturizer. The above recommended are non-pore clogging.      Wear a sunscreen with at least SPF 30 on your face, ears, neck and V of the chest daily. Wear sunscreen on other areas of the body if those areas are exposed to the sun throughout the day. Sunscreens can contain physical and/or chemical blockers. Physical blockers are less likely to clog pores, these include zinc oxide and titanium dioxide. Reapply every two hour and after swimming.     Sunscreen examples: https://www.ewg.org/sunscreen/    UV radiation  UVA radiation remains constant throughout the day and throughout the year. It is a longer wavelength than UVB and therefore penetrates deeper into the skin leading to immediate and delayed tanning, photoaging, and skin cancer. 70-80% of UVA and UVB radiation occurs between the hours of 10am-2pm.  UVB radiation  UVB radiation causes the most harmful effects and is more significant during the summer months. However, snow and ice can reflect UVB radiation leading to skin damage during the winter months as well. UVB radiation is responsible for tanning,  burning, inflammation, delayed erythema (pinkness), pigmentation (brown spots), and skin cancer.     I recommend self monthly full body exams and yearly full body exams with a dermatology provider. If you develop a new or changing lesion please follow up for examination. Most skin cancers are pink and scaly or pink and pearly. However, we do see blue/brown/black skin cancers.  Consider the ABCDEs of melanoma when giving yourself your monthly full body exam ( don't forget the groin, buttocks, feet, toes, etc). A-asymmetry, B-borders, C-color, D-diameter, E-elevation or evolving. If you see any of these changes please follow up in clinic. If you cannot see your back I recommend purchasing a hand held mirror to use with a larger wall mirror.       Checking for Skin Cancer  You can find cancer early by checking your skin each month. There are 3 kinds of skin cancer. They are melanoma, basal cell carcinoma, and squamous cell carcinoma. Doing monthly skin checks is the best way to find new marks or skin changes. Follow the instructions below for checking your skin.   The ABCDEs of checking moles for melanoma   Check your moles or growths for signs of melanoma using ABCDE:   Asymmetry: the sides of the mole or growth don t match  Border: the edges are ragged, notched, or blurred  Color: the color within the mole or growth varies  Diameter: the mole or growth is larger than 6 mm (size of a pencil eraser)  Evolving: the size, shape, or color of the mole or growth is changing (evolving is not shown in the images below)    Checking for other types of skin cancer  Basal cell carcinoma or squamous cell carcinoma have symptoms such as:     A spot or mole that looks different from all other marks on your skin  Changes in how an area feels, such as itching, tenderness, or pain  Changes in the skin's surface, such as oozing, bleeding, or scaliness  A sore that does not heal  New swelling or redness beyond the border of a  mole    Who s at risk?  Anyone can get skin cancer. But you are at greater risk if you have:   Fair skin, light-colored hair, or light-colored eyes  Many moles or abnormal moles on your skin  A history of sunburns from sunlight or tanning beds  A family history of skin cancer  A history of exposure to radiation or chemicals  A weakened immune system  If you have had skin cancer in the past, you are at risk for recurring skin cancer.   How to check your skin  Do your monthly skin checkups in front of a full-length mirror. Check all parts of your body, including your:   Head (ears, face, neck, and scalp)  Torso (front, back, and sides)  Arms (tops, undersides, upper, and lower armpits)  Hands (palms, backs, and fingers, including under the nails)  Buttocks and genitals  Legs (front, back, and sides)  Feet (tops, soles, toes, including under the nails, and between toes)  If you have a lot of moles, take digital photos of them each month. Make sure to take photos both up close and from a distance. These can help you see if any moles change over time.   Most skin changes are not cancer. But if you see any changes in your skin, call your doctor right away. Only he or she can diagnose a problem. If you have skin cancer, seeing your doctor can be the first step toward getting the treatment that could save your life.   Given.to last reviewed this educational content on 4/1/2019 2000-2020 [employer]. [address] [place] [address], [place] [address]. All rights reserved. This information is not intended as a substitute for professional medical care. Always follow your healthcare professional's instructions.       When should I call my doctor?  If you are worsening or not improving, please, contact us or seek urgent care as noted below.     Who should I call with questions (adults)?    [facility] [phone]  For urgent needs outside of business hours call the [facility] at  [phone] and ask for the dermatology resident on call to be paged  If this is a medical emergency and you are unable to reach an ER, Call 911      If you need a prescription refill, please contact your pharmacy. Refills are approved or denied by our Physicians during normal business hours, Monday through Fridays  Per office policy, refills will not be granted if you have not been seen within the past year (or sooner depending on your child's condition)

## 2024-10-09 NOTE — LETTER
10/9/2024      Melva Saleh  [address] [facility] [address]      Dear Colleague,    Thank you for referring your patient, Melva Saleh, to the [facility]. Please see a copy of my visit note below.    [facility] Dermatology Note  Encounter Date: Oct 9, 2024  Office Visit     Reviewed patients past medical history and pertinent chart review prior to patients visit today.     Dermatology Problem List:  -Neoplasm of uncertain behavior: right upper chest, s/p shave bx, 10/9/2024  -Dermatitis vs psoriasis,  scalp and hands. Current tx: clobetsasol solution and augmented betamethasone 0.05% cream, has follow up with Dr. Pal on 10/24/24    Personal Hx: Negative for skin cancer  Family Hx: Positive for non melanoma skin cancer, mother  ____________________________________________    Assessment & Plan:     # Neoplasm of uncertain behavior:  right upper chest.  DDx includes irritated nevus vs neurofibroma. Shave biopsy today.  Photograph obtained.  Procedure Note: Biopsy by shave technique  The risks and benefits of the procedure were described to the patient. These include but are not limited to bleeding, infection, scar, incomplete removal, and non-diagnostic biopsy. Verbal informed consent was obtained. The above site(s) was cleansed with an alcohol pad and injected with 1% lidocaine with epinephrine. Once anesthesia was obtained, a biopsy(ies) was performed with Gilette blade. The tissue(s) was placed in a labeled container(s) with formalin and sent to pathology. Hemostasis was achieved with aluminum chloride. Vaseline and a bandage were applied to the wound(s). The patient tolerated the procedure well and was given post biopsy care instructions.    Follow up: PRN for new or changing lesions     Sudha Patel CNP  Dermatology   _______________________________________    CC: Derm Problem (Sensitive spot R upper breast/armpit area over the past year)    HPI:  Ms.  Melva Saleh is a(n) 69 year old female who presents today as a return patient for evaluation of a skin lesion involving the right upper chest. This lesion is relatively new. It does feel sensitive to the touch. It otherwise does not itch or bleed. It has not grown.    Patient is otherwise feeling well, without additional skin concerns.    Physical Exam:  SKIN: Focused examination of right upper chest only was performed per patient request.  - right upper chest, soft flesh-colored to pink papule with reassuring features on dermoscopy    - No other lesions of concern on areas examined.     Medications:  Current Outpatient Medications   Medication Sig Dispense Refill     augmented betamethasone dipropionate (DIPROLENE AF) 0.05 % external cream Apply topically 2 times daily To hands 100 g 6     clobetasol (TEMOVATE) 0.05 % external solution Apply topically 2 times daily 100 mL 6     clobetasol propionate (CLOBEX) 0.05 % external shampoo Lather into scalp daily as needed. Leave on for 5 minutes. Rinse. May use conditioner afterwards. 118 mL 1     fluocinonide (LIDEX) 0.05 % external cream Apply topically 2 times daily 30 g 0     celecoxib (CELEBREX) 200 MG capsule Take 1 capsule (200 mg) by mouth daily (Patient not taking: Reported on 10/9/2024) 30 capsule 0     HYDROcodone-acetaminophen (NORCO) 5-325 MG tablet Take 0.5-1 tablets by mouth nightly as needed for severe pain (Patient not taking: Reported on 10/9/2024) 10 tablet 0     No current facility-administered medications for this visit.      Past Medical History:   Patient Active Problem List   Diagnosis     BALBIR (generalized anxiety disorder)     Chronic left shoulder pain     Past Medical History:   Diagnosis Date     BALBIR (generalized anxiety disorder)        CC Referred Self, MD  No address on file on close of this encounter.       Again, thank you for allowing me to participate in the care of your patient.        Sincerely,        KAI Sarabia CNP

## 2024-10-09 NOTE — TELEPHONE ENCOUNTER
Patient returning call. Patient is available the rest of today. Please call back to discuss. Thanks.

## 2024-10-09 NOTE — TELEPHONE ENCOUNTER
Pt called back and scheduled with Sudha Patel today 10/9 at 2:45 pm at  [facility].    Carol GRANDA RN  [facility]  [phone]

## 2024-10-09 NOTE — PROGRESS NOTES
[facility] Dermatology Note  Encounter Date: Oct 9, 2024  Office Visit     Reviewed patients past medical history and pertinent chart review prior to patients visit today.     Dermatology Problem List:  -Neoplasm of uncertain behavior: right upper chest, s/p shave bx, 10/9/2024  -Dermatitis vs psoriasis,  scalp and hands. Current tx: clobetsasol solution and augmented betamethasone 0.05% cream, has follow up with Dr. Pal on 10/24/24    Personal Hx: Negative for skin cancer  Family Hx: Positive for non melanoma skin cancer, mother  ____________________________________________    Assessment & Plan:     # Neoplasm of uncertain behavior:  right upper chest.  DDx includes irritated nevus vs neurofibroma. Shave biopsy today.  Photograph obtained.  Procedure Note: Biopsy by shave technique  The risks and benefits of the procedure were described to the patient. These include but are not limited to bleeding, infection, scar, incomplete removal, and non-diagnostic biopsy. Verbal informed consent was obtained. The above site(s) was cleansed with an alcohol pad and injected with 1% lidocaine with epinephrine. Once anesthesia was obtained, a biopsy(ies) was performed with Gilette blade. The tissue(s) was placed in a labeled container(s) with formalin and sent to pathology. Hemostasis was achieved with aluminum chloride. Vaseline and a bandage were applied to the wound(s). The patient tolerated the procedure well and was given post biopsy care instructions.    Follow up: PRN for new or changing lesions     Sudha Patel CNP  Dermatology   _______________________________________    CC: Derm Problem (Sensitive spot R upper breast/armpit area over the past year)    HPI:  Ms. Melva Saleh is a(n) 69 year old female who presents today as a return patient for evaluation of a skin lesion involving the right upper chest. This lesion is relatively new. It does feel sensitive to the touch. It otherwise does not itch or  bleed. It has not grown.    Patient is otherwise feeling well, without additional skin concerns.    Physical Exam:  SKIN: Focused examination of right upper chest only was performed per patient request.  - right upper chest, soft flesh-colored to pink papule with reassuring features on dermoscopy    - No other lesions of concern on areas examined.     Medications:  Current Outpatient Medications   Medication Sig Dispense Refill    augmented betamethasone dipropionate (DIPROLENE AF) 0.05 % external cream Apply topically 2 times daily To hands 100 g 6    clobetasol (TEMOVATE) 0.05 % external solution Apply topically 2 times daily 100 mL 6    clobetasol propionate (CLOBEX) 0.05 % external shampoo Lather into scalp daily as needed. Leave on for 5 minutes. Rinse. May use conditioner afterwards. 118 mL 1    fluocinonide (LIDEX) 0.05 % external cream Apply topically 2 times daily 30 g 0    celecoxib (CELEBREX) 200 MG capsule Take 1 capsule (200 mg) by mouth daily (Patient not taking: Reported on 10/9/2024) 30 capsule 0    HYDROcodone-acetaminophen (NORCO) 5-325 MG tablet Take 0.5-1 tablets by mouth nightly as needed for severe pain (Patient not taking: Reported on 10/9/2024) 10 tablet 0     No current facility-administered medications for this visit.      Past Medical History:   Patient Active Problem List   Diagnosis    BALBIR (generalized anxiety disorder)    Chronic left shoulder pain     Past Medical History:   Diagnosis Date    BALBIR (generalized anxiety disorder)        CC Referred Self, MD  No address on file on close of this encounter.

## 2024-10-09 NOTE — TELEPHONE ENCOUNTER
Patient Contact    Attempt # 1    Was call answered?  No.  Left message on voicemail with information to call nurse back at [phone].    Carol GRANDA RN  [facility] [place]  [phone]

## 2024-10-09 NOTE — TELEPHONE ENCOUNTER
[facility] Call Center    Phone Message    May a detailed message be left on voicemail: yes     Reason for Call: Symptoms or Concerns     If patient has red-flag symptoms, warm transfer to triage line    Current symptom or concern: pt reporting a lesion looked like keratosis lesion, pink & raised sensitive to the touch, she has an appt coming up on 10/24 for eczema on hands, will be heading out of town but back next week, does she need to have a seprate appt for this? Can she be seen for it sooner?    Symptoms have been present for:  1 years(s)    Has patient previously been seen for this? No    Are there any new or worsening symptoms? Yes: noticed it was more sensitive than the other spots about 9 months ago    Action Taken: Other: Ox derm    Travel Screening: Not Applicable     Date of Service:

## 2024-10-16 LAB
PATH REPORT.COMMENTS IMP SPEC: NORMAL
PATH REPORT.FINAL DX SPEC: NORMAL
PATH REPORT.GROSS SPEC: NORMAL
PATH REPORT.MICROSCOPIC SPEC OTHER STN: NORMAL
PATH REPORT.RELEVANT HX SPEC: NORMAL

## 2024-11-26 ENCOUNTER — MYC REFILL (OUTPATIENT)
Dept: DERMATOLOGY | Facility: CLINIC | Age: 69
End: 2024-11-26
Payer: COMMERCIAL

## 2024-11-26 DIAGNOSIS — L30.9 DERMATITIS: ICD-10-CM

## 2024-11-27 RX ORDER — BETAMETHASONE DIPROPIONATE 0.5 MG/G
CREAM TOPICAL 2 TIMES DAILY
Qty: 100 G | Refills: 6 | Status: SHIPPED | OUTPATIENT
Start: 2024-11-27

## 2024-12-05 ENCOUNTER — PATIENT OUTREACH (OUTPATIENT)
Dept: CARE COORDINATION | Facility: CLINIC | Age: 69
End: 2024-12-05
Payer: COMMERCIAL

## 2024-12-10 ENCOUNTER — IMMUNIZATION (OUTPATIENT)
Dept: INTERNAL MEDICINE | Facility: CLINIC | Age: 69
End: 2024-12-10
Payer: COMMERCIAL

## 2024-12-10 PROCEDURE — 90662 IIV NO PRSV INCREASED AG IM: CPT

## 2024-12-10 PROCEDURE — G0008 ADMIN INFLUENZA VIRUS VAC: HCPCS

## 2024-12-19 ENCOUNTER — PATIENT OUTREACH (OUTPATIENT)
Dept: CARE COORDINATION | Facility: CLINIC | Age: 69
End: 2024-12-19
Payer: COMMERCIAL

## 2025-02-10 ENCOUNTER — TELEPHONE (OUTPATIENT)
Dept: INTERNAL MEDICINE | Facility: CLINIC | Age: 70
End: 2025-02-10

## 2025-02-10 ENCOUNTER — OFFICE VISIT (OUTPATIENT)
Dept: FAMILY MEDICINE | Facility: CLINIC | Age: 70
End: 2025-02-10
Payer: COMMERCIAL

## 2025-02-10 VITALS
WEIGHT: 158 LBS | HEART RATE: 78 BPM | RESPIRATION RATE: 14 BRPM | OXYGEN SATURATION: 97 % | HEIGHT: 68 IN | TEMPERATURE: 97.6 F | SYSTOLIC BLOOD PRESSURE: 126 MMHG | BODY MASS INDEX: 23.95 KG/M2 | DIASTOLIC BLOOD PRESSURE: 78 MMHG

## 2025-02-10 DIAGNOSIS — Z01.818 PRE-OPERATIVE EXAMINATION: Primary | ICD-10-CM

## 2025-02-10 DIAGNOSIS — H25.9 SENILE CATARACT OF LEFT EYE, UNSPECIFIED AGE-RELATED CATARACT TYPE: ICD-10-CM

## 2025-02-10 PROCEDURE — 99214 OFFICE O/P EST MOD 30 MIN: CPT | Performed by: FAMILY MEDICINE

## 2025-02-10 RX ORDER — BETAMETHASONE DIPROPIONATE 0.5 MG/G
CREAM TOPICAL 2 TIMES DAILY
Qty: 100 G | Refills: 6 | Status: CANCELLED | OUTPATIENT
Start: 2025-02-10

## 2025-02-10 ASSESSMENT — PAIN SCALES - GENERAL: PAINLEVEL_OUTOF10: NO PAIN (0)

## 2025-02-10 NOTE — PROGRESS NOTES
Preoperative Evaluation  [address] [address]  Phone: [phone]  Primary Provider: Makayla Hawthorne MD  Pre-op Performing Provider: Sourav Marley MD  Feb 10, 2025             2/8/2025   Surgical Information   What procedure is being done? (Left) Cataract surgery   Facility or Hospital where procedure/surgery will be performed: [facility]   Who is doing the procedure / surgery? Dr. Trav Delaney   Date of surgery / procedure: February 18, 2025   Time of surgery / procedure: Unknown at this time   Where do you plan to recover after surgery? at home with family     Fax number for surgical facility: [phone]    Assessment & Plan     The proposed surgical procedure is considered LOW risk.    Pre-operative examination      Senile cataract of left eye, unspecified age-related cataract type        - No identified additional risk factors other than previously addressed    Antiplatelet or Anticoagulation Medication Instructions   - Patient is on no antiplatelet or anticoagulation medications.    Additional Medication Instructions  Take all scheduled medications on the day of surgery    Recommendation  Approval given to proceed with proposed procedure, without further diagnostic evaluation.    Mark Frye is a 70 year old, presenting for the following:  Pre-Op Exam          2/10/2025     9:52 AM   Additional Questions   Roomed by Paige PATEL         2/10/2025   Forms   Any forms needing to be completed Yes           2/8/2025   Pre-Op Questionnaire   Have you ever had a heart attack or stroke? No   Have you ever had surgery on your heart or blood vessels, such as a stent placement, a coronary artery bypass, or surgery on an artery in your head, neck, heart, or legs? No   Do you have chest pain with activity? No   Do you have a history of heart failure? No   Do you currently have a cold, bronchitis or symptoms of other infection? No    Do you have a cough, shortness of breath, or wheezing? No   Do you or anyone in your family have previous history of blood clots? No   Do you or does anyone in your family have a serious bleeding problem such as prolonged bleeding following surgeries or cuts? No   Have you ever had problems with anemia or been told to take iron pills? No   Have you had any abnormal blood loss such as black, tarry or bloody stools, or abnormal vaginal bleeding? No   Have you ever had a blood transfusion? No   Are you willing to have a blood transfusion if it is medically needed before, during, or after your surgery? Yes   Have you or any of your relatives ever had problems with anesthesia? No   Do you have sleep apnea, excessive snoring or daytime drowsiness? No   Do you have any artifical heart valves or other implanted medical devices like a pacemaker, defibrillator, or continuous glucose monitor? No   Do you have artificial joints? No   Are you allergic to latex? No     Health Care Directive  Patient does not have a Health Care Directive: Patient states has Advance Directive and will bring in a copy to clinic.    Preoperative Review of    reviewed - no record of controlled substances prescribed.      Status of Chronic Conditions:  See problem list for active medical problems.  Problems all longstanding and stable, except as noted/documented.  See ROS for pertinent symptoms related to these conditions.    Patient Active Problem List    Diagnosis Date Noted    Chronic left shoulder pain 08/12/2024     Priority: Medium    BALBIR (generalized anxiety disorder)      Priority: Medium      Past Medical History:   Diagnosis Date    BALBIR (generalized anxiety disorder)      Past Surgical History:   Procedure Laterality Date    COLONOSCOPY N/A 10/1/2018    Procedure: COLONOSCOPY;  colonoscopy;  Surgeon: Kendra Olson MD;  Location:  GI    RETINAL REATTACHMENT Right 2020    TONSILLECTOMY & ADENOIDECTOMY  as a child     Current  Outpatient Medications   Medication Sig Dispense Refill    augmented betamethasone dipropionate (DIPROLENE AF) 0.05 % external cream Apply topically 2 times daily. To hands 100 g 6    clobetasol (TEMOVATE) 0.05 % external solution Apply topically 2 times daily 100 mL 6    celecoxib (CELEBREX) 200 MG capsule Take 1 capsule (200 mg) by mouth daily (Patient not taking: Reported on 10/9/2024) 30 capsule 0    clobetasol propionate (CLOBEX) 0.05 % external shampoo Lather into scalp daily as needed. Leave on for 5 minutes. Rinse. May use conditioner afterwards. (Patient not taking: Reported on 2/10/2025) 118 mL 1    fluocinonide (LIDEX) 0.05 % external cream Apply topically 2 times daily 30 g 0    HYDROcodone-acetaminophen (NORCO) 5-325 MG tablet Take 0.5-1 tablets by mouth nightly as needed for severe pain (Patient not taking: Reported on 10/9/2024) 10 tablet 0       Allergies   Allergen Reactions    Amoxicillin-Pot Clavulanate Difficulty breathing    Erythromycin Nausea    Penicillins Hives        Social History     Tobacco Use    Smoking status: Never    Smokeless tobacco: Never   Substance Use Topics    Alcohol use: Yes     Comment: very rare     Family History   Problem Relation Age of Onset    Skin Cancer Mother     Anxiety Disorder Mother     Coronary Artery Disease Father         s/p PCI (stent) later in life    Prostate Cancer Father     Breast Cancer Paternal Grandmother     Myocardial Infarction Paternal Grandfather         later in life    Breast Cancer Maternal Aunt     Diabetes Type 2  Paternal Aunt     Thyroid Disease Sister     Cerebrovascular Disease No family hx of     Ovarian Cancer No family hx of     Colon Cancer No family hx of      History   Drug Use No             Review of Systems  Constitutional, HEENT, cardiovascular, pulmonary, GI, , musculoskeletal, neuro, skin, endocrine and psych systems are negative, except as otherwise noted.    Objective    /78 (BP Location: Left arm, Patient  "Position: Sitting, Cuff Size: Adult Regular)   Pulse 78   Temp 97.6  F (36.4  C) (Tympanic)   Resp 14   Ht 1.73 m (5' 8.11\")   Wt 71.7 kg (158 lb)   LMP  (LMP Unknown)   SpO2 97%   BMI 23.95 kg/m     Estimated body mass index is 23.95 kg/m  as calculated from the following:    Height as of this encounter: 1.73 m (5' 8.11\").    Weight as of this encounter: 71.7 kg (158 lb).  Physical Exam  GENERAL: alert and no distress  EYES: Eyes grossly normal to inspection, PERRL and conjunctivae and sclerae normal  HENT: ear canals and TM's normal, nose and mouth without ulcers or lesions  NECK: no adenopathy, no asymmetry, masses, or scars  RESP: lungs clear to auscultation - no rales, rhonchi or wheezes  CV: regular rate and rhythm, normal S1 S2, no S3 or S4, no murmur, click or rub, no peripheral edema  ABDOMEN: soft, nontender, no hepatosplenomegaly, no masses and bowel sounds normal  MS: no gross musculoskeletal defects noted, no edema  SKIN: no suspicious lesions or rashes  NEURO: Normal strength and tone, mentation intact and speech normal  BACK: no CVA tenderness, no paralumbar tenderness  PSYCH: mentation appears normal, affect normal/bright  LYMPH: no cervical, supraclavicular, axillary, or inguinal adenopathy    No results for input(s): \"HGB\", \"PLT\", \"INR\", \"NA\", \"POTASSIUM\", \"CR\", \"A1C\" in the last 8760 hours.     Diagnostics  No labs were ordered during this visit.   No EKG required for low risk surgery (cataract, skin procedure, breast biopsy, etc).    Revised Cardiac Risk Index (RCRI)  The patient has the following serious cardiovascular risks for perioperative complications:   - No serious cardiac risks = 0 points     RCRI Interpretation: 0 points: Class I (very low risk - 0.4% complication rate)         Signed Electronically by: Sourav Marley MD  A copy of this evaluation report is provided to the requesting physician.         "

## 2025-02-22 ENCOUNTER — HEALTH MAINTENANCE LETTER (OUTPATIENT)
Age: 70
End: 2025-02-22

## 2025-02-24 ENCOUNTER — MYC REFILL (OUTPATIENT)
Dept: DERMATOLOGY | Facility: CLINIC | Age: 70
End: 2025-02-24
Payer: COMMERCIAL

## 2025-02-24 DIAGNOSIS — L30.9 DERMATITIS: ICD-10-CM

## 2025-02-24 RX ORDER — BETAMETHASONE DIPROPIONATE 0.5 MG/G
CREAM TOPICAL 2 TIMES DAILY
Qty: 100 G | Refills: 6 | Status: SHIPPED | OUTPATIENT
Start: 2025-02-24

## 2025-02-28 ENCOUNTER — TELEPHONE (OUTPATIENT)
Dept: INTERNAL MEDICINE | Facility: CLINIC | Age: 70
End: 2025-02-28
Payer: COMMERCIAL

## 2025-02-28 DIAGNOSIS — M54.9 CHRONIC BACK PAIN, UNSPECIFIED BACK LOCATION, UNSPECIFIED BACK PAIN LATERALITY: Primary | ICD-10-CM

## 2025-02-28 DIAGNOSIS — G89.29 CHRONIC BACK PAIN, UNSPECIFIED BACK LOCATION, UNSPECIFIED BACK PAIN LATERALITY: Primary | ICD-10-CM

## 2025-02-28 NOTE — TELEPHONE ENCOUNTER
Pt called the clinic stating that she has had scoliosis for years, but soreness has increased recently and she has found herself taking more ibuprofen than usual.  She denies any urinary symptoms.     She has an appt in March to address this.     She is wondering what can be done in the meantime for pain relief, and is open next week for an appt.   Discussed heat relief/care advice with pt.     Dr. Hawthorne - any chance you're able to see pt next week for an exam of her back?      Can we leave a detailed message on this number? YES  Phone number patient can be reached at: Home number on file [phone] (home)    Reyna Rivera RN  [facility] [place] Triage

## 2025-02-28 NOTE — TELEPHONE ENCOUNTER
The next best step for reported symptoms is physical therapy.    Happy to place a referral if she would like one.

## 2025-03-09 ENCOUNTER — HOSPITAL ENCOUNTER (EMERGENCY)
Facility: CLINIC | Age: 70
Discharge: HOME | End: 2025-03-09
Attending: EMERGENCY MEDICINE | Admitting: EMERGENCY MEDICINE
Payer: COMMERCIAL

## 2025-03-09 ENCOUNTER — APPOINTMENT (OUTPATIENT)
Dept: CT IMAGING | Facility: CLINIC | Age: 70
End: 2025-03-09
Attending: EMERGENCY MEDICINE
Payer: COMMERCIAL

## 2025-03-09 VITALS
OXYGEN SATURATION: 97 % | TEMPERATURE: 97.7 F | DIASTOLIC BLOOD PRESSURE: 84 MMHG | RESPIRATION RATE: 16 BRPM | BODY MASS INDEX: 24.25 KG/M2 | WEIGHT: 160 LBS | SYSTOLIC BLOOD PRESSURE: 175 MMHG | HEART RATE: 114 BPM

## 2025-03-09 DIAGNOSIS — N20.0 NEPHROLITHIASIS: ICD-10-CM

## 2025-03-09 DIAGNOSIS — R31.9 HEMATURIA, UNSPECIFIED TYPE: ICD-10-CM

## 2025-03-09 DIAGNOSIS — K57.92 DIVERTICULITIS: ICD-10-CM

## 2025-03-09 LAB
ALBUMIN UR-MCNC: NEGATIVE MG/DL
ANION GAP SERPL CALCULATED.3IONS-SCNC: 12 MMOL/L (ref 7–15)
APPEARANCE UR: CLEAR
BASOPHILS # BLD AUTO: 0 10E3/UL (ref 0–0.2)
BASOPHILS NFR BLD AUTO: 0 %
BILIRUB UR QL STRIP: NEGATIVE
BUN SERPL-MCNC: 10 MG/DL (ref 8–23)
CALCIUM SERPL-MCNC: 9.5 MG/DL (ref 8.8–10.4)
CHLORIDE SERPL-SCNC: 101 MMOL/L (ref 98–107)
COLOR UR AUTO: ABNORMAL
CREAT SERPL-MCNC: 0.95 MG/DL (ref 0.51–0.95)
EGFRCR SERPLBLD CKD-EPI 2021: 65 ML/MIN/1.73M2
EOSINOPHIL # BLD AUTO: 0.1 10E3/UL (ref 0–0.7)
EOSINOPHIL NFR BLD AUTO: 1 %
ERYTHROCYTE [DISTWIDTH] IN BLOOD BY AUTOMATED COUNT: 12.6 % (ref 10–15)
GLUCOSE SERPL-MCNC: 187 MG/DL (ref 70–99)
GLUCOSE UR STRIP-MCNC: NEGATIVE MG/DL
HCO3 SERPL-SCNC: 25 MMOL/L (ref 22–29)
HCT VFR BLD AUTO: 42.2 % (ref 35–47)
HGB BLD-MCNC: 13.9 G/DL (ref 11.7–15.7)
HGB UR QL STRIP: ABNORMAL
IMM GRANULOCYTES # BLD: 0 10E3/UL
IMM GRANULOCYTES NFR BLD: 0 %
KETONES UR STRIP-MCNC: NEGATIVE MG/DL
LEUKOCYTE ESTERASE UR QL STRIP: NEGATIVE
LYMPHOCYTES # BLD AUTO: 1.1 10E3/UL (ref 0.8–5.3)
LYMPHOCYTES NFR BLD AUTO: 10 %
MCH RBC QN AUTO: 28 PG (ref 26.5–33)
MCHC RBC AUTO-ENTMCNC: 32.9 G/DL (ref 31.5–36.5)
MCV RBC AUTO: 85 FL (ref 78–100)
MONOCYTES # BLD AUTO: 0.6 10E3/UL (ref 0–1.3)
MONOCYTES NFR BLD AUTO: 6 %
NEUTROPHILS # BLD AUTO: 8.3 10E3/UL (ref 1.6–8.3)
NEUTROPHILS NFR BLD AUTO: 82 %
NITRATE UR QL: NEGATIVE
NRBC # BLD AUTO: 0 10E3/UL
NRBC BLD AUTO-RTO: 0 /100
PH UR STRIP: 6.5 [PH] (ref 5–7)
PLATELET # BLD AUTO: 205 10E3/UL (ref 150–450)
POTASSIUM SERPL-SCNC: 4.2 MMOL/L (ref 3.4–5.3)
RBC # BLD AUTO: 4.96 10E6/UL (ref 3.8–5.2)
RBC URINE: 10 /HPF
SODIUM SERPL-SCNC: 138 MMOL/L (ref 135–145)
SP GR UR STRIP: 1.01 (ref 1–1.03)
UROBILINOGEN UR STRIP-MCNC: NORMAL MG/DL
WBC # BLD AUTO: 10.1 10E3/UL (ref 4–11)
WBC URINE: 3 /HPF

## 2025-03-09 PROCEDURE — 81003 URINALYSIS AUTO W/O SCOPE: CPT | Performed by: EMERGENCY MEDICINE

## 2025-03-09 PROCEDURE — 80048 BASIC METABOLIC PNL TOTAL CA: CPT | Performed by: EMERGENCY MEDICINE

## 2025-03-09 PROCEDURE — 36415 COLL VENOUS BLD VENIPUNCTURE: CPT | Performed by: EMERGENCY MEDICINE

## 2025-03-09 PROCEDURE — 250N000011 HC RX IP 250 OP 636: Performed by: EMERGENCY MEDICINE

## 2025-03-09 PROCEDURE — 250N000009 HC RX 250: Performed by: EMERGENCY MEDICINE

## 2025-03-09 PROCEDURE — 85025 COMPLETE CBC W/AUTO DIFF WBC: CPT | Performed by: EMERGENCY MEDICINE

## 2025-03-09 PROCEDURE — 99285 EMERGENCY DEPT VISIT HI MDM: CPT | Mod: 25

## 2025-03-09 PROCEDURE — 80051 ELECTROLYTE PANEL: CPT | Performed by: EMERGENCY MEDICINE

## 2025-03-09 PROCEDURE — 258N000003 HC RX IP 258 OP 636: Performed by: EMERGENCY MEDICINE

## 2025-03-09 PROCEDURE — 96360 HYDRATION IV INFUSION INIT: CPT | Mod: 59

## 2025-03-09 PROCEDURE — 74177 CT ABD & PELVIS W/CONTRAST: CPT

## 2025-03-09 RX ORDER — METRONIDAZOLE 500 MG/1
500 TABLET ORAL 2 TIMES DAILY
Qty: 14 TABLET | Refills: 0 | Status: SHIPPED | OUTPATIENT
Start: 2025-03-09 | End: 2025-03-16

## 2025-03-09 RX ORDER — IOPAMIDOL 755 MG/ML
80 INJECTION, SOLUTION INTRAVASCULAR ONCE
Status: COMPLETED | OUTPATIENT
Start: 2025-03-09 | End: 2025-03-09

## 2025-03-09 RX ORDER — CEFUROXIME AXETIL 500 MG/1
500 TABLET ORAL 2 TIMES DAILY
Qty: 14 TABLET | Refills: 0 | Status: SHIPPED | OUTPATIENT
Start: 2025-03-09 | End: 2025-03-16

## 2025-03-09 RX ADMIN — SODIUM CHLORIDE 64 ML: 9 INJECTION, SOLUTION INTRAVENOUS at 13:24

## 2025-03-09 RX ADMIN — SODIUM CHLORIDE 1000 ML: 0.9 INJECTION, SOLUTION INTRAVENOUS at 12:03

## 2025-03-09 RX ADMIN — IOPAMIDOL 80 ML: 755 INJECTION, SOLUTION INTRAVENOUS at 13:23

## 2025-03-09 ASSESSMENT — COLUMBIA-SUICIDE SEVERITY RATING SCALE - C-SSRS
1. IN THE PAST MONTH, HAVE YOU WISHED YOU WERE DEAD OR WISHED YOU COULD GO TO SLEEP AND NOT WAKE UP?: NO
2. HAVE YOU ACTUALLY HAD ANY THOUGHTS OF KILLING YOURSELF IN THE PAST MONTH?: NO
6. HAVE YOU EVER DONE ANYTHING, STARTED TO DO ANYTHING, OR PREPARED TO DO ANYTHING TO END YOUR LIFE?: NO

## 2025-03-09 ASSESSMENT — ACTIVITIES OF DAILY LIVING (ADL)
ADLS_ACUITY_SCORE: 41

## 2025-03-09 NOTE — ED NOTES
Pt presents to room reporting low abdominal cramping for 5 days that worsens with the need to have a bowel movement. Pt denies blood in her stools. Pt denies urinary symptoms and reports nausea but denies vomiting with the pain.

## 2025-03-09 NOTE — ED TRIAGE NOTES
Pt c/o diarrhea in the mornings since Wednesdays. Pt reports this morning she had a formed soft BM. Pt has Hx of diverticulitis and worried that she may have it again.      Triage Assessment (Adult)       Row Name 03/09/25 1125          Triage Assessment    Airway WDL WDL        Respiratory WDL    Respiratory WDL WDL        Cardiac WDL    Cardiac WDL rhythm     Pulse Rate & Regularity tachycardic        Cognitive/Neuro/Behavioral WDL    Cognitive/Neuro/Behavioral WDL WDL

## 2025-03-09 NOTE — ED PROVIDER NOTES
Emergency Department Note      History of Present Illness     Chief Complaint   Diarrhea      HPI   Melva Saleh is a 70 year old female with a history of generalized anxiety disorder who presents for evaluation of diarrhea which she has been experiencing since Wednesday, 4 days ago. She felt nauseous on Wednesday night but denies any vomiting. Her symptoms has been present persistently over past 4 days with greenish, lost stools. She has also been experiencing lightheadedness however denies any episode of fainting. She started liquid diet 2 days ago which did not help relive her symptoms significantly. She also reports associated mild abdominal cramping.  She is lactose intolerant however denies any lactose intake or intake of any unusual foods. She states she has a history of diverticulitis and suspects similar symptoms. She denies any blood in stool, dysuria, burning while urination or hematuria. No chest pain or shortness of breath. She denies past surgical history of abdominal surgery. She reports she has used Metamucil prior to her symptoms onset.     Independent Historian   None    Review of External Notes   Dr. Hawthorne office note and referral to gastroenterology secondary to acute diverticulitis reviewed from 5/13/2018.    Past Medical History     Medical History and Problem List   Generalized anxiety disorder     Medications   The patient denies any significant past medical history.     Surgical History   Colonoscopy   Retinal surgery   Tonsillectomy   Adenoidectomy   Physical Exam     Patient Vitals for the past 24 hrs:   BP Temp Temp src Pulse Resp SpO2 Weight   03/09/25 1124 (!) 175/84 97.7  F (36.5  C) Temporal 114 16 97 % 72.6 kg (160 lb)     Physical Exam  General:  Sitting on bed.  HENT:  No obvious trauma to head  Right Ear:  External ear normal.   Left Ear:  External ear normal.   Nose:  Nose normal.   Eyes:  Conjunctivae and EOM are normal. Pupils are equal, round, and reactive.   Neck: Normal  range of motion. Neck supple. No tracheal deviation present.   CV:  Normal heart sounds. No murmur heard.  Pulm/Chest: Effort normal and breath sounds normal.   Abd: Soft. No distension. There is slight suprapubic tenderness. There is no rigidity, no rebound and no guarding.   M/S: Normal range of motion.   Neuro: Awake and alert.   Skin: Skin is warm and dry. No rash noted. Not diaphoretic.   Psych: Normal mood and affect. Behavior is normal.      Diagnostics     Lab Results   Labs Ordered and Resulted from Time of ED Arrival to Time of ED Departure   BASIC METABOLIC PANEL - Abnormal       Result Value    Sodium 138      Potassium 4.2      Chloride 101      Carbon Dioxide (CO2) 25      Anion Gap 12      Urea Nitrogen 10.0      Creatinine 0.95      GFR Estimate 65      Calcium 9.5      Glucose 187 (*)    ROUTINE UA WITH MICROSCOPIC REFLEX TO CULTURE - Abnormal    Color Urine Light Yellow      Appearance Urine Clear      Glucose Urine Negative      Bilirubin Urine Negative      Ketones Urine Negative      Specific Gravity Urine 1.010      Blood Urine Moderate (*)     pH Urine 6.5      Protein Albumin Urine Negative      Urobilinogen Urine Normal      Nitrite Urine Negative      Leukocyte Esterase Urine Negative      RBC Urine 10 (*)     WBC Urine 3     CBC WITH PLATELETS AND DIFFERENTIAL    WBC Count 10.1      RBC Count 4.96      Hemoglobin 13.9      Hematocrit 42.2      MCV 85      MCH 28.0      MCHC 32.9      RDW 12.6      Platelet Count 205      % Neutrophils 82      % Lymphocytes 10      % Monocytes 6      % Eosinophils 1      % Basophils 0      % Immature Granulocytes 0      NRBCs per 100 WBC 0      Absolute Neutrophils 8.3      Absolute Lymphocytes 1.1      Absolute Monocytes 0.6      Absolute Eosinophils 0.1      Absolute Basophils 0.0      Absolute Immature Granulocytes 0.0      Absolute NRBCs 0.0     ENTERIC BACTERIA AND VIRUS PANEL BY PCR       Imaging   CT Abdomen Pelvis w Contrast   Preliminary Result    IMPRESSION:    1.  Findings suspicious for acute diverticulitis involving the proximal sigmoid colon. No abscess.   2.  Small amount of free fluid in the pelvis, likely related to diverticulitis.   3.  Nonobstructing stones in the lower pole of the right kidney, with the largest measuring 1.3 cm.           Independent Interpretation   None    ED Course      Medications Administered   Medications   sodium chloride 0.9% BOLUS 1,000 mL (0 mLs Intravenous Stopped 3/9/25 1255)   iopamidol (ISOVUE-370) solution 80 mL (80 mLs Intravenous $Given 3/9/25 1323)   Saline (64 mLs As instructed $Given 3/9/25 1324)       Procedures   Procedures     Discussion of Management   None    ED Course   ED Course as of 03/09/25 1409   Sun Mar 09, 2025   1137 I obtained the history and examined the patient as above.    1405 I rechecked and updated the patient.         Additional Documentation  None    Medical Decision Making / Diagnosis     CMS Diagnoses: None    MIPS       None    MDM   Melva Saleh is a very pleasant 70 year old female, with a history of diverticulitis, who presents to the emergency department for evaluation of  abdominal pain and diarrhea. Upon presentation in the ED, the patient is non-toxic appearing. her vitals are  within normal limits and stable. On exam, she is well appearing, alert and oriented. Neurologic exam is non-focal. Cardiopulmonary exam is unremarkable. On abdominal exam, she does endorse tenderness to palpation of the lower left/suprapubic area. There is no rebound or guarding. The rest of the exam is as mentioned above.     Given that he is tender and has a history of diverticulitis, the above workup was performed. Laboratory studies were obtained and are as above. Notably, she does not have a leukocytosis. CT of abdomen/pelvis was obtained that showed evidence of acute diverticulitis with no evidence of abscess or rupture. These results were discussed with the patient .  I reviewed the incidental  hematuria and nephrolithiasis.  No flank pain to suggest ureterolithiasis or recently passed stone.  I did review the risk and benefit of antibiotics.  I did recommend a clear liquid followed by brat diet.  Recommended continued hydration.  She did feel somewhat better after the IV fluids.  I had a thorough discussion about the risk and benefit of antibiotics and current recommendations for when antibiotics are indicated.  She does not have a leukocytosis, fever, abscess or perforation.  The patient has had diverticulitis in the past and did feel better after antibiotics.  She reports an upcoming trip that she is worried about being able to attend.  The patient did prefer antibiotics and after reviewing the risk and benefit of this given how long she has had symptoms for and her history of this is reasonable.  Stool testing have been ordered, but she denies any recent exposure to any illness.  She has not had any nausea or vomiting.  No recent antibiotics to suggest C. difficile.  Antibiotics prescribed as below for the diverticulitis.     The treatment plan was discussed with the patient and they expressed understanding of this plan and consented to the plan.  In addition, the patient will return to the emergency department if their symptoms persist, worsen, if new symptoms arise or if there is any concern as other pathology may be present that is not evident at this time. They also understand the importance of close follow up in the clinic and if unable to do so will return to the emergency department for a reevaluation. All questions were answered.    Disposition   The patient was discharged.     Diagnosis     ICD-10-CM    1. Diverticulitis  K57.92       2. Hematuria, unspecified type  R31.9       3. Nephrolithiasis  N20.0            Discharge Medications   New Prescriptions    No medications on file       Scribe Disclosure:  Noemi MCFADDEN am serving as a scribe at 11:38 AM on 3/9/2025 to document services  personally performed by Saul Parrish DO based on my observations and the provider's statements to me.        Saul Parrish DO  03/09/25 0202

## 2025-03-10 ENCOUNTER — PATIENT OUTREACH (OUTPATIENT)
Dept: INTERNAL MEDICINE | Facility: CLINIC | Age: 70
End: 2025-03-10
Payer: COMMERCIAL

## 2025-03-10 NOTE — TELEPHONE ENCOUNTER
Transitions of Care Outreach  Chief Complaint   Patient presents with    Hospital F/U       Most Recent Admission Date: 3/9/2025   Most Recent Admission Diagnosis:      Most Recent Discharge Date: 3/9/2025   Most Recent Discharge Diagnosis: Diverticulitis - K57.92  Hematuria, unspecified type - R31.9  Nephrolithiasis - N20.0     Transitions of Care Assessment    Discharge Assessment  How are you doing now that you are home?: 'feel better than yesterday' - still doing liquids-only and transition a bit on Tuesday; much less lightheaded feeling  How are your symptoms? (Red Flag symptoms escalate to triage hotline per guidelines): Improved  Do you know how to contact your clinic care team if you have future questions or changes to your health status? : Yes  Does the patient have their discharge instructions? : Yes  Does the patient have questions regarding their discharge instructions? : No  Were you started on any new medications or were there changes to any of your previous medications? : Yes  Does the patient have all of their medications?: Yes  Do you have questions regarding any of your medications? : No  Do you have all of your needed medical supplies or equipment (DME)?  (i.e. oxygen tank, CPAP, cane, etc.): Yes    Follow up Plan     Discharge Follow-Up  Discharge follow up appointment scheduled in alignment with recommended follow up timeframe or Transitions of Risk Category? (Low = within 30 days; Moderate= within 14 days; High= within 7 days): Yes  Discharge Follow Up Appointment Date: 03/12/25  Discharge Follow Up Appointment Scheduled with?: Primary Care Provider    Future Appointments   Date Time Provider Department Center   3/12/2025  2:30 PM Makayla Hawthorne MD [facility]   3/20/2025 11:00 AM Maria Fernanda Plaza, PT [facility]   3[facility]25[facility]2025  9:00 AM [facility]   3/27/2025  2:20 PM Maria Fernanda Plaza PT [facility]       Outpatient Plan as outlined on AVS reviewed with patient.    For  any urgent concerns, please contact our 24 hour nurse triage line: [phone] ([phone])       Nickie Vasquez RN

## 2025-03-11 ENCOUNTER — PATIENT OUTREACH (OUTPATIENT)
Dept: CARE COORDINATION | Facility: CLINIC | Age: 70
End: 2025-03-11
Payer: COMMERCIAL

## 2025-03-12 ENCOUNTER — OFFICE VISIT (OUTPATIENT)
Dept: INTERNAL MEDICINE | Facility: CLINIC | Age: 70
End: 2025-03-12
Payer: COMMERCIAL

## 2025-03-12 VITALS
HEART RATE: 82 BPM | TEMPERATURE: 97.6 F | OXYGEN SATURATION: 97 % | BODY MASS INDEX: 23.79 KG/M2 | SYSTOLIC BLOOD PRESSURE: 112 MMHG | WEIGHT: 157 LBS | DIASTOLIC BLOOD PRESSURE: 72 MMHG

## 2025-03-12 DIAGNOSIS — K57.92 ACUTE DIVERTICULITIS: Primary | ICD-10-CM

## 2025-03-12 PROCEDURE — 3074F SYST BP LT 130 MM HG: CPT | Performed by: INTERNAL MEDICINE

## 2025-03-12 PROCEDURE — G2211 COMPLEX E/M VISIT ADD ON: HCPCS | Performed by: INTERNAL MEDICINE

## 2025-03-12 PROCEDURE — 3078F DIAST BP <80 MM HG: CPT | Performed by: INTERNAL MEDICINE

## 2025-03-12 PROCEDURE — 99213 OFFICE O/P EST LOW 20 MIN: CPT | Performed by: INTERNAL MEDICINE

## 2025-03-12 NOTE — PROGRESS NOTES
ASSESSMENT/PLAN                                                      (K57.92) Acute diverticulitis  (primary encounter diagnosis)  Comment: clinically improving.  Plan: continue and complete antibiotics; recommend colonoscopy in 2 months for follow-up (referral placed - patient to schedule); may use Gas-X/Beano as needed for gassiness/bloating; once antibiotics are completed, may advance diet as tolerated and reintroduce her daily fiber supplement; if symptoms worsen, change, or do not improve, patient to contact MD.      Makayla Hawthorne MD   [address] [address]  T: [phone], F: [phone]    SUBJECTIVE                                                      Melva Saleh is a very pleasant 70 year old female who presents for ER follow-up:    Patient presented to the ER 3/9/2025 with diarrhea and lightheadedness. CT demonstrated acute diverticulitis involving the proximal sigmoid colon. Evaluation otherwise unremarkable. Discharged on cefuroxime and metronidazole.    Overall, patient feels better now. Stools are soft and partially formed. No recurrent diarrhea. No melena or hematochezia. Much more gassy and bloated than usual. No nausea or vomiting. No abdominal pain or cramping. No fevers or chills.    OBJECTIVE                                                      /72   Pulse 82   Temp 97.6  F (36.4  C) (Temporal)   Wt 71.2 kg (157 lb)   LMP  (LMP Unknown)   SpO2 97%   BMI 23.79 kg/m    Constitutional: well-appearing  Respiratory: normal respiratory effort; clear to auscultation bilaterally  Cardiovascular: regular rate and rhythm; no edema  Gastrointestinal: soft, non-tender, and mildly-distended; hyperactive bowel sounds no organomegaly or masses  Musculoskeletal: normal gait and station  Psych: normal judgment and insight; normal mood and affect; recent and remote memory intact    ---    (Note documentation was completed, in part, with [employer]  voice-recognition software. Documentation was reviewed, but some grammatical, spelling, and word errors may remain.)

## 2025-03-19 ENCOUNTER — ANCILLARY PROCEDURE (OUTPATIENT)
Dept: MAMMOGRAPHY | Facility: CLINIC | Age: 70
End: 2025-03-19
Attending: INTERNAL MEDICINE
Payer: COMMERCIAL

## 2025-03-19 DIAGNOSIS — Z12.31 VISIT FOR SCREENING MAMMOGRAM: ICD-10-CM

## 2025-03-19 PROCEDURE — 77063 BREAST TOMOSYNTHESIS BI: CPT | Mod: TC | Performed by: RADIOLOGY

## 2025-03-19 PROCEDURE — 77067 SCR MAMMO BI INCL CAD: CPT | Mod: TC | Performed by: RADIOLOGY

## 2025-04-03 ENCOUNTER — THERAPY VISIT (OUTPATIENT)
Dept: PHYSICAL THERAPY | Facility: CLINIC | Age: 70
End: 2025-04-03
Payer: COMMERCIAL

## 2025-04-03 DIAGNOSIS — M54.9 CHRONIC BACK PAIN, UNSPECIFIED BACK LOCATION, UNSPECIFIED BACK PAIN LATERALITY: ICD-10-CM

## 2025-04-03 DIAGNOSIS — G89.29 CHRONIC BACK PAIN, UNSPECIFIED BACK LOCATION, UNSPECIFIED BACK PAIN LATERALITY: ICD-10-CM

## 2025-04-03 NOTE — PROGRESS NOTES
PHYSICAL THERAPY EVALUATION  Type of Visit: Evaluation    Patient presents with signs and symptoms consistent with chronic low back pain secondary to poor core stability and pelvic postural awareness. Patient demonstrates impairments including decreased lumbar stability with hypermobility, overuse of gluteals leading to tightness. Patient with functional limitations including standing for longer periods of time to cook. Patient would benefit from skilled PT to progress and improve impairments and concerns.      Subjective     Patient is doing OK for her low back, she is in recovery for diverticulitis so she is feeling a little out of it. She has been relatively comfortable due to the resting. Not a lot of complaints. Most of the time the lower back and upper back are bothering her. She does have a history of scoliosis and arthritis at her low back. She is relatively an active person and she finds benefit from walking. She has been working on flexibility and stretching for her gluteals. She has been on recovery for about a month        Presenting condition or subjective complaint: improve strength and flexibility in mid and lower back  Date of onset: 03/03/25 (PT order)    Relevant medical history: Arthritis; Bladder or bowel problems; Concussions   Dates & types of surgery: cataract feb 18. Retina repair both eyes in 2020 and 2021. cataract 2020.    Prior diagnostic imaging/testing results: CT scan; X-ray; Bone scan     Prior therapy history for the same diagnosis, illness or injury: No      Living Environment  Social support: With family members   Type of home: House   Stairs to enter the home: Yes 2 Is there a railing: No     Ramp: No   Stairs inside the home: Yes 12 Is there a railing: Yes     Help at home: None  Equipment owned:       Employment: No    Hobbies/Interests: cooking  walking camping reading  music    Patient goals for therapy: standing in one  place has always caused pain and has gotten worse  recently   standing for food prep brings on pain    Pain assessment: Pain present  See objective evaluation for additional pain details     Objective   Pain Location: midline low back  Pain Quality: achy  Pain Frequency: on occasion  Pain is Worst: she got a different mattress and a lot has changed for her - sometimes when she gets up it bothers her  Pain is Exacerbated By: standing to do food prep, it doesn't start right away  Pain is Relieved By: heating pad helps get rid of it  Pain Progression: gotten better since this diverticulitis    LUMBAR SPINE EVALUATION    POSTURE: Standing Posture: Rounded shoulders, Forward head  Sitting Posture: Rounded shoulders, Forward head      ROM:   (Degrees) Left AROM Left PROM  Right AROM Right PROM   Hip Flexion       Hip Extension       Hip Abduction       Hip Adduction       Hip Internal Rotation       Hip External Rotation       Knee Flexion       Knee Extension       Lumbar Side glide     Lumbar Flexion Fingers to floor   Lumbar Extension Limited end range     MYOTOMES: WFL no inc of P    FLEXIBILITY: decreased of HF  PALPATION: NT today    LUMBAR/HIP Special Tests:    Left Right   LICO - -   FADIR/Labrum/YINA - -   Femoral Nerve     Yumi's     Piriformis     Quadrant Testing     SLR - -   Slump - -   Stork with Extension     Lance                Assessment & Plan   CLINICAL IMPRESSIONS  Medical Diagnosis: Chronic low back pain    Treatment Diagnosis: Poor core stability, low back pain   Impression/Assessment: Patient is a 70 year old female with low back pain complaints.  The following significant findings have been identified: Pain, Decreased ROM/flexibility, Decreased activity tolerance, and Impaired posture. These impairments interfere with their ability to perform self care tasks, recreational activities, household chores, household mobility, and community mobility as compared to previous level of function.     Clinical Decision Making (Complexity):  Clinical  Presentation: Stable/Uncomplicated  Clinical Presentation Rationale: based on medical and personal factors listed in PT evaluation  Clinical Decision Making (Complexity): Low complexity    PLAN OF CARE  Treatment Interventions:  Interventions: Gait Training, Manual Therapy, Neuromuscular Re-education, Therapeutic Activity, Therapeutic Exercise, Self-Care/Home Management    Long Term Goals     PT Goal 1  Goal Identifier: Standing  Goal Description: Patient will be able to stand at the kitchen counter to cook food for at least 1 hour without increased P symptoms in order to tolerate meal prep without difficulty.  Goal Progress: 20 min  Target Date: 06/02/25      Frequency of Treatment: 1x/week for 4 weeks, 2x/month for 2 months, then PRN  Duration of Treatment: up to 70 days       Risks and benefits of evaluation/treatment have been explained.   Patient/Family/caregiver agrees with Plan of Care.     Evaluation Time:     PT Eval, Low Complexity Minutes (95356): 13     Signing Clinician: YUNG SAHA [facility]                                                                                   OUTPATIENT PHYSICAL THERAPY      PLAN OF TREATMENT FOR OUTPATIENT REHABILITATION   Patient's Last Name, First Name, BONISERA SalehMelva  SKYE YOB: 1955   Provider's Name   [facility]   Medical Record No.  [id number]     Onset Date: 03/03/25 (PT order)  Start of Care Date: 04/03/25     Medical Diagnosis:  Chronic low back pain      PT Treatment Diagnosis:  Poor core stability, low back pain Plan of Treatment  Frequency/Duration: 1x/week for 4 weeks, 2x/month for 2 months, then PRN/ up to 70 days    Certification date from 04/03/25 to 06/11/25         See note for plan of treatment details and functional goals     SUMA SELLERS, PT                         I CERTIFY THE NEED FOR THESE SERVICES FURNISHED UNDER        THIS PLAN OF TREATMENT AND WHILE UNDER  MY CARE     (Physician attestation of this document indicates review and certification of the therapy plan).              Referring Provider:  Makayla Hawthorne    Initial Assessment  See Epic Evaluation- Start of Care Date: 04/03/25

## 2025-04-05 ENCOUNTER — LAB (OUTPATIENT)
Dept: LAB | Facility: CLINIC | Age: 70
End: 2025-04-05
Payer: COMMERCIAL

## 2025-04-05 DIAGNOSIS — K57.92 ACUTE DIVERTICULITIS: ICD-10-CM

## 2025-04-05 DIAGNOSIS — R73.09 ELEVATED GLUCOSE: ICD-10-CM

## 2025-04-05 LAB
ALBUMIN SERPL BCG-MCNC: 4.5 G/DL (ref 3.5–5.2)
ALP SERPL-CCNC: 73 U/L (ref 40–150)
ALT SERPL W P-5'-P-CCNC: 27 U/L (ref 0–50)
ANION GAP SERPL CALCULATED.3IONS-SCNC: 11 MMOL/L (ref 7–15)
AST SERPL W P-5'-P-CCNC: 31 U/L (ref 0–45)
BILIRUB SERPL-MCNC: 0.5 MG/DL
BUN SERPL-MCNC: 8.8 MG/DL (ref 8–23)
CALCIUM SERPL-MCNC: 9.6 MG/DL (ref 8.8–10.4)
CHLORIDE SERPL-SCNC: 98 MMOL/L (ref 98–107)
CREAT SERPL-MCNC: 0.69 MG/DL (ref 0.51–0.95)
EGFRCR SERPLBLD CKD-EPI 2021: >90 ML/MIN/1.73M2
ERYTHROCYTE [DISTWIDTH] IN BLOOD BY AUTOMATED COUNT: 12.6 % (ref 10–15)
GLUCOSE SERPL-MCNC: 186 MG/DL (ref 70–99)
HCO3 SERPL-SCNC: 25 MMOL/L (ref 22–29)
HCT VFR BLD AUTO: 39.4 % (ref 35–47)
HGB BLD-MCNC: 13 G/DL (ref 11.7–15.7)
MCH RBC QN AUTO: 27.7 PG (ref 26.5–33)
MCHC RBC AUTO-ENTMCNC: 33 G/DL (ref 31.5–36.5)
MCV RBC AUTO: 84 FL (ref 78–100)
PLATELET # BLD AUTO: 189 10E3/UL (ref 150–450)
POTASSIUM SERPL-SCNC: 4.3 MMOL/L (ref 3.4–5.3)
PROT SERPL-MCNC: 6.9 G/DL (ref 6.4–8.3)
RBC # BLD AUTO: 4.7 10E6/UL (ref 3.8–5.2)
SODIUM SERPL-SCNC: 134 MMOL/L (ref 135–145)
WBC # BLD AUTO: 7.6 10E3/UL (ref 4–11)

## 2025-04-05 PROCEDURE — 85027 COMPLETE CBC AUTOMATED: CPT

## 2025-04-05 PROCEDURE — 83036 HEMOGLOBIN GLYCOSYLATED A1C: CPT

## 2025-04-05 PROCEDURE — 36415 COLL VENOUS BLD VENIPUNCTURE: CPT

## 2025-04-05 PROCEDURE — 80053 COMPREHEN METABOLIC PANEL: CPT

## 2025-04-06 DIAGNOSIS — R73.09 ELEVATED GLUCOSE: Primary | ICD-10-CM

## 2025-04-06 LAB
EST. AVERAGE GLUCOSE BLD GHB EST-MCNC: 126 MG/DL
HBA1C MFR BLD: 6 % (ref 0–5.6)

## 2025-04-11 ENCOUNTER — HOSPITAL ENCOUNTER (OUTPATIENT)
Dept: CT IMAGING | Facility: CLINIC | Age: 70
Discharge: HOME | End: 2025-04-11
Attending: INTERNAL MEDICINE | Admitting: INTERNAL MEDICINE
Payer: COMMERCIAL

## 2025-04-11 DIAGNOSIS — K57.92 ACUTE DIVERTICULITIS: ICD-10-CM

## 2025-04-11 PROCEDURE — 250N000011 HC RX IP 250 OP 636: Performed by: INTERNAL MEDICINE

## 2025-04-11 PROCEDURE — 74177 CT ABD & PELVIS W/CONTRAST: CPT

## 2025-04-11 PROCEDURE — 250N000009 HC RX 250: Performed by: INTERNAL MEDICINE

## 2025-04-11 RX ORDER — IOPAMIDOL 755 MG/ML
77 INJECTION, SOLUTION INTRAVASCULAR ONCE
Status: COMPLETED | OUTPATIENT
Start: 2025-04-11 | End: 2025-04-11

## 2025-04-11 RX ADMIN — SODIUM CHLORIDE 62 ML: 9 INJECTION, SOLUTION INTRAVENOUS at 15:35

## 2025-04-11 RX ADMIN — IOPAMIDOL 77 ML: 755 INJECTION, SOLUTION INTRAVENOUS at 15:34

## 2025-04-14 DIAGNOSIS — K57.92 ACUTE DIVERTICULITIS: Primary | ICD-10-CM

## 2025-04-14 RX ORDER — METRONIDAZOLE 500 MG/1
500 TABLET ORAL 2 TIMES DAILY
Qty: 10 TABLET | Refills: 0 | Status: SHIPPED | OUTPATIENT
Start: 2025-04-14 | End: 2025-04-19

## 2025-04-14 RX ORDER — CIPROFLOXACIN 500 MG/1
500 TABLET, FILM COATED ORAL 2 TIMES DAILY
Qty: 10 TABLET | Refills: 0 | Status: SHIPPED | OUTPATIENT
Start: 2025-04-14 | End: 2025-04-19

## 2025-04-21 ENCOUNTER — OFFICE VISIT (OUTPATIENT)
Dept: PEDIATRICS | Facility: CLINIC | Age: 70
End: 2025-04-21
Payer: COMMERCIAL

## 2025-04-21 ENCOUNTER — NURSE TRIAGE (OUTPATIENT)
Dept: INTERNAL MEDICINE | Facility: CLINIC | Age: 70
End: 2025-04-21

## 2025-04-21 VITALS
DIASTOLIC BLOOD PRESSURE: 80 MMHG | WEIGHT: 144 LBS | BODY MASS INDEX: 21.82 KG/M2 | TEMPERATURE: 97.9 F | OXYGEN SATURATION: 98 % | SYSTOLIC BLOOD PRESSURE: 131 MMHG | RESPIRATION RATE: 20 BRPM | HEART RATE: 91 BPM | HEIGHT: 68 IN

## 2025-04-21 DIAGNOSIS — R19.7 DIARRHEA, UNSPECIFIED TYPE: Primary | ICD-10-CM

## 2025-04-21 LAB
ALBUMIN SERPL-MCNC: 4.3 G/DL (ref 3.4–5)
ALBUMIN UR-MCNC: ABNORMAL MG/DL
ALP SERPL-CCNC: 67 U/L (ref 40–150)
ALT SERPL W P-5'-P-CCNC: 25 U/L (ref 0–50)
ANION GAP SERPL CALCULATED.3IONS-SCNC: 12 MMOL/L (ref 3–14)
APPEARANCE UR: CLEAR
AST SERPL W P-5'-P-CCNC: 29 U/L (ref 0–45)
BACTERIA #/AREA URNS HPF: ABNORMAL /HPF
BASOPHILS # BLD AUTO: 0 10E3/UL (ref 0–0.2)
BASOPHILS NFR BLD AUTO: 0 %
BILIRUB SERPL-MCNC: 0.8 MG/DL (ref 0.2–1.3)
BILIRUB UR QL STRIP: NEGATIVE
BUN SERPL-MCNC: 8 MG/DL (ref 7–30)
CALCIUM SERPL-MCNC: 10.1 MG/DL (ref 8.5–10.1)
CHLORIDE BLD-SCNC: 107 MMOL/L (ref 94–109)
CO2 SERPL-SCNC: 25 MMOL/L (ref 20–32)
COLOR UR AUTO: YELLOW
CREAT SERPL-MCNC: 0.5 MG/DL (ref 0.52–1.04)
EGFRCR SERPLBLD CKD-EPI 2021: >90 ML/MIN/1.73M2
EOSINOPHIL # BLD AUTO: 0.2 10E3/UL (ref 0–0.7)
EOSINOPHIL NFR BLD AUTO: 2 %
ERYTHROCYTE [DISTWIDTH] IN BLOOD BY AUTOMATED COUNT: 13.3 % (ref 10–15)
GLUCOSE BLD-MCNC: 129 MG/DL (ref 70–99)
GLUCOSE UR STRIP-MCNC: NEGATIVE MG/DL
HCT VFR BLD AUTO: 41.5 % (ref 35–47)
HGB BLD-MCNC: 13.3 G/DL (ref 11.7–15.7)
HGB UR QL STRIP: ABNORMAL
IMM GRANULOCYTES # BLD: 0 10E3/UL
IMM GRANULOCYTES NFR BLD: 0 %
KETONES UR STRIP-MCNC: 15 MG/DL
LEUKOCYTE ESTERASE UR QL STRIP: NEGATIVE
LIPASE SERPL-CCNC: 5 U/L (ref 13–60)
LYMPHOCYTES # BLD AUTO: 1.3 10E3/UL (ref 0.8–5.3)
LYMPHOCYTES NFR BLD AUTO: 17 %
MCH RBC QN AUTO: 27.7 PG (ref 26.5–33)
MCHC RBC AUTO-ENTMCNC: 32 G/DL (ref 31.5–36.5)
MCV RBC AUTO: 86 FL (ref 78–100)
MONOCYTES # BLD AUTO: 0.5 10E3/UL (ref 0–1.3)
MONOCYTES NFR BLD AUTO: 6 %
MUCOUS THREADS #/AREA URNS LPF: PRESENT /LPF
NEUTROPHILS # BLD AUTO: 6 10E3/UL (ref 1.6–8.3)
NEUTROPHILS NFR BLD AUTO: 75 %
NITRATE UR QL: NEGATIVE
PH UR STRIP: 6 [PH] (ref 5–7)
PLATELET # BLD AUTO: 201 10E3/UL (ref 150–450)
POTASSIUM BLD-SCNC: 3.8 MMOL/L (ref 3.4–5.3)
PROT SERPL-MCNC: 7.2 G/DL (ref 6.8–8.8)
RBC # BLD AUTO: 4.81 10E6/UL (ref 3.8–5.2)
RBC #/AREA URNS AUTO: ABNORMAL /HPF
SODIUM SERPL-SCNC: 144 MMOL/L (ref 135–145)
SP GR UR STRIP: 1.02 (ref 1–1.03)
SQUAMOUS #/AREA URNS AUTO: ABNORMAL /LPF
UROBILINOGEN UR STRIP-ACNC: 0.2 E.U./DL
WBC # BLD AUTO: 8.1 10E3/UL (ref 4–11)
WBC #/AREA URNS AUTO: ABNORMAL /HPF

## 2025-04-21 PROCEDURE — 80053 COMPREHEN METABOLIC PANEL: CPT | Performed by: PREVENTIVE MEDICINE

## 2025-04-21 PROCEDURE — 81001 URINALYSIS AUTO W/SCOPE: CPT | Performed by: PREVENTIVE MEDICINE

## 2025-04-21 PROCEDURE — 36415 COLL VENOUS BLD VENIPUNCTURE: CPT | Performed by: PREVENTIVE MEDICINE

## 2025-04-21 PROCEDURE — 3079F DIAST BP 80-89 MM HG: CPT | Performed by: PREVENTIVE MEDICINE

## 2025-04-21 PROCEDURE — 83690 ASSAY OF LIPASE: CPT | Performed by: PREVENTIVE MEDICINE

## 2025-04-21 PROCEDURE — 99215 OFFICE O/P EST HI 40 MIN: CPT | Performed by: PREVENTIVE MEDICINE

## 2025-04-21 PROCEDURE — 85025 COMPLETE CBC W/AUTO DIFF WBC: CPT | Performed by: PREVENTIVE MEDICINE

## 2025-04-21 PROCEDURE — 3075F SYST BP GE 130 - 139MM HG: CPT | Performed by: PREVENTIVE MEDICINE

## 2025-04-21 PROCEDURE — 1125F AMNT PAIN NOTED PAIN PRSNT: CPT | Performed by: PREVENTIVE MEDICINE

## 2025-04-21 ASSESSMENT — PAIN SCALES - GENERAL: PAINLEVEL_OUTOF10: MILD PAIN (1)

## 2025-04-21 NOTE — TELEPHONE ENCOUNTER
"Patient called regarding her [employer] message. She stated for the past week she has been having abdominal cramping for the past week with \"globs of oil in the stool\" and it has been happening more often.  Patient was seen in the emergency department on 3/9 and diagnosed with diverticulitis. She stated that the cramping is worse than when she went to the ED at the time (at worse currently is 7/10). She has had a CT done on 3/9 and 4/11 but she stated that \"something has changed in this past week\". She stated that the pain is kind of constant but it worsens to the 7/10 when she has to go to the bathroom. After she goes to the bathroom she stated it is back to more of a discomfort. Called [facility] and they agreed to see the patient today.    Reason for Disposition   Abdomen pain is main symptom and female   MILD TO MODERATE constant pain lasting > 2 hours, and age > 60 years    Additional Information   Negative: Passed out (e.g., fainted, lost consciousness, blacked out and was not responding)   Negative: Shock suspected (e.g., cold/pale/clammy skin, too weak to stand, low BP, rapid pulse)   Negative: Sounds like a life-threatening emergency to the triager   Negative: Followed an abdomen (stomach) injury   Negative: Chest pain   Negative: Abdominal pain and pregnant < 20 weeks   Negative: Abdominal pain and pregnant 20 or more weeks   Negative: Pain is mainly in upper abdomen (if needed ask: 'is it mainly above the belly button?')   Negative: Abdomen bloating or swelling are main symptoms   Negative: SEVERE abdominal pain (e.g., excruciating)   Negative: Vomiting red blood or black (coffee ground) material   Negative: Blood in bowel movements  (Exception: Blood on surface of BM with constipation.)   Negative: Black or tarry bowel movements  (Exception: Chronic-unchanged black-grey BMs AND is taking iron pills or Pepto-Bismol.)   Negative: MILD TO MODERATE constant pain lasting > 2 hours    Answer Assessment - Initial " "Assessment Questions  1. COLOR: \"What color is your stool?\" \"Is that color in part or all of the stool?\" (e.g., black, andressa-colored, green, red)         Oily looking and yellow-brown color    2. ONSET: \"When did you first notice this color change?\"        Couple of weeks but worse in the past week (cramping and more oil looking in the stool)    3. CAUSE: \"Have you eaten any food or taken any medicine of this color?\" Note: See listing in Background Information section.         No    4. OTHER SYMPTOMS: \"Do you have any other symptoms?\" (e.g., abdomen pain, diarrhea, fever, yellow eyes or skin).        Abdomen cramping (7/10 in lower abdomin)    Answer Assessment - Initial Assessment Questions  1. LOCATION: \"Where does it hurt?\"         Lower abdomen    2. RADIATION: \"Does the pain shoot anywhere else?\" (e.g., chest, back)        No    3. ONSET: \"When did the pain begin?\" (e.g., minutes, hours or days ago)         The last week    4. SUDDEN: \"Gradual or sudden onset?\"        Sudden    5. PATTERN \"Does the pain come and go, or is it constant?\"        Comes and goes better after going to the bathroom    6. SEVERITY: \"How bad is the pain?\"  (e.g., Scale 1-10; mild, moderate, or severe)        7/10     7. RECURRENT SYMPTOM: \"Have you ever had this type of stomach pain before?\" If Yes, ask: \"When was the last time?\" and \"What happened that time?\"         Unsure    8. CAUSE: \"What do you think is causing the stomach pain?\"        Diverticulitis?     9. RELIEVING/AGGRAVATING FACTORS: \"What makes it better or worse?\" (e.g., antacids, bending or twisting motion, bowel movement)        Going to the bathroom    10. OTHER SYMPTOMS: \"Do you have any other symptoms?\" (e.g., back pain, diarrhea, fever, urination pain, vomiting)          Diarrhea with oil looking    11. PREGNANCY: \"Is there any chance you are pregnant?\" \"When was your last menstrual period?\"          No    Protocols used: Stools - Unusual Color-A-OH, Abdominal Pain - " Female-A-OH    Nancy ROTH RN  [facility] Triage Team

## 2025-04-21 NOTE — PROGRESS NOTES
Acute and Diagnostic Services Clinic Visit    In brief, this is a 71 yo female who has loose stools over the past several days.  Has had two rounds of antibiotics for diverticulitis over the past month.  Currently has no abdominal pain, n/v/f.  No blood in stool or black stool.  She is eating and driking fine.  CBC, CMP, Lipase reassuring.  Will do stool studies including c diff and enteric pathogens to check for infection.  Advised to push fluids and eat a bland diet.  Can use imodium if needed.      Follow up in 10-14 days for recheck or sooner as needed      Assessment & Plan     Diarrhea, unspecified type  She is presenting with worsening abdominal cramping, loose stools, and oily stools for the past week. About a month ago she was seen in the ED and was treated for acute diverticulitis. Since then has had a fluctuant course and has not yet returned to her baseline. Repeat imaging 4/11 again with mild uncomplicated diverticulitis and she completed another course of antibiotics. Here today her abdomen is fully soft and non tender. Afebrile. We decided not to pursue imaging today since the patient had no tenderness in the abdomen. CBC and CMP are unremarkable. UA with 10-25 RBCs. Lipase low at 5.  Discussed these laboratory findings with the patient. She does not think she has ever been diagnosed with ?chronic pancreatitis, though the CT scan from 4/11 does note this, and low lipase could also be consistent with this. I encouraged her to follow up with her PCP for this as she may need additional pancreas imaging at some point. Regarding the hematuria noted on urinalysis, this does appear to be consistent with prior urinalyses and she has had no urinary symptoms at all so did not pursue imaging and would recommend repeat urinalysis in 2-3 weeks to ensure resolution. Discussed return precautions. Finally given the prolonged nature of her symptoms and multiple full courses of antibiotics we did send her home with  stool collection for C diff and enteric panel testing.       - CBC with platelets differential; Future  - Comprehensive metabolic panel; Future  - Lipase; Future  - UA with Microscopic reflex to Culture; Future  - CBC with platelets differential  - Comprehensive metabolic panel  - Lipase  - UA with Microscopic reflex to Culture  - Enteric Bacteria and Virus Panel by TABITHA Stool; Future  - C. difficile Toxin B PCR with reflex to C. difficile EIA; Future  - UA Microscopic with Reflex to Culture  - Enteric Bacteria and Virus Panel by TABITHA Stool  - C. difficile Toxin B PCR with reflex to C. difficile EIA    40 minutes were spent doing chart review, history and exam, documentation and further activities per the note.             Mark Frye is a 70 year old, presenting for the following health issues:  Abdominal Pain (Patient is here with abdominal cramping.  Has history of diverticulitis.   Patient is noticing oily presence in stools.)    HPI      Abdominal/Flank Pain  Onset/Duration: History of Diverticulitis in March   Description:   Character: Fullness and Cramping  Location: suprapubic region  Radiation: None  Intensity: moderate  Progression of Symptoms:  worsening  Accompanying Signs & Symptoms:  Fever/chills: no   Gas/Bloating: no   Nausea: no   Vomitting: no   Diarrhea: YES- loose stools not watery.  Constipation:no   Dysuria: no            Hematuria: no            Frequency: no            Incontinence of urine: no   History:            Last bowel movement: today  Trauma: no   Previous similar pain: YES- Recently completed antibiotics for diverticulitis    Previous tests done: CT           Previous Abdominal surgery: no   Precipitating factors:   Does the pain change with:     Food: YES- eating a big meal     Bowel Movement: YES- cramping during BM then subsides.    Urination: no              Other factors: no   Therapies tried and outcome:  None    When food last eaten: 9 AM this morning.           "  Objective    /80 (BP Location: Right arm, Patient Position: Sitting, Cuff Size: Adult Regular)   Pulse 91   Temp 97.9  F (36.6  C) (Temporal)   Resp 20   Ht 1.727 m (5' 8\")   Wt 65.3 kg (144 lb)   LMP  (LMP Unknown)   SpO2 98%   BMI 21.90 kg/m    Body mass index is 21.9 kg/m .  Physical Exam   GENERAL: alert and no distress  NECK: no adenopathy, no asymmetry, masses, or scars  RESP: lungs clear to auscultation - no rales, rhonchi or wheezes  CV: regular rate and rhythm, normal S1 S2, no S3 or S4, no murmur, click or rub, no peripheral edema  ABDOMEN: soft, nontender, no hepatosplenomegaly, no masses and bowel sounds normal  MS: no gross musculoskeletal defects noted, no edema    Results for orders placed or performed in visit on 04/21/25 (from the past 24 hours)   CBC with platelets differential    Narrative    The following orders were created for panel order CBC with platelets differential.  Procedure                               Abnormality         Status                     ---------                               -----------         ------                     CBC with platelets and ...[[id number]]                      Final result                 Please view results for these tests on the individual orders.   Comprehensive metabolic panel   Result Value Ref Range    Sodium 144 135 - 145 mmol/L    Potassium 3.8 3.4 - 5.3 mmol/L    Chloride 107 94 - 109 mmol/L    Carbon Dioxide (CO2) 25 20 - 32 mmol/L    Anion Gap 12 3 - 14 mmol/L    Urea Nitrogen 8 7 - 30 mg/dL    Creatinine 0.50 (L) 0.52 - 1.04 mg/dL    GFR Estimate >90 >60 mL/min/1.73m2    Calcium 10.1 8.5 - 10.1 mg/dL    Glucose 129 (H) 70 - 99 mg/dL    Alkaline Phosphatase 67 40 - 150 U/L    AST 29 0 - 45 U/L    ALT 25 0 - 50 U/L    Protein Total 7.2 6.8 - 8.8 g/dL    Albumin 4.3 3.4 - 5.0 g/dL    Bilirubin Total 0.8 0.2 - 1.3 mg/dL   Lipase   Result Value Ref Range    Lipase 5 (L) 13 - 60 U/L   CBC with platelets and differential   Result " Value Ref Range    WBC Count 8.1 4.0 - 11.0 10e3/uL    RBC Count 4.81 3.80 - 5.20 10e6/uL    Hemoglobin 13.3 11.7 - 15.7 g/dL    Hematocrit 41.5 35.0 - 47.0 %    MCV 86 78 - 100 fL    MCH 27.7 26.5 - 33.0 pg    MCHC 32.0 31.5 - 36.5 g/dL    RDW 13.3 10.0 - 15.0 %    Platelet Count 201 150 - 450 10e3/uL    % Neutrophils 75 %    % Lymphocytes 17 %    % Monocytes 6 %    % Eosinophils 2 %    % Basophils 0 %    % Immature Granulocytes 0 %    Absolute Neutrophils 6.0 1.6 - 8.3 10e3/uL    Absolute Lymphocytes 1.3 0.8 - 5.3 10e3/uL    Absolute Monocytes 0.5 0.0 - 1.3 10e3/uL    Absolute Eosinophils 0.2 0.0 - 0.7 10e3/uL    Absolute Basophils 0.0 0.0 - 0.2 10e3/uL    Absolute Immature Granulocytes 0.0 <=0.4 10e3/uL   UA with Microscopic reflex to Culture    Specimen: Urine, Midstream   Result Value Ref Range    Color Urine Yellow Colorless, Straw, Light Yellow, Yellow    Appearance Urine Clear Clear    Glucose Urine Negative Negative mg/dL    Bilirubin Urine Negative Negative    Ketones Urine 15 (A) Negative mg/dL    Specific Gravity Urine 1.020 1.003 - 1.035    Blood Urine Moderate (A) Negative    pH Urine 6.0 5.0 - 7.0    Protein Albumin Urine Trace (A) Negative mg/dL    Urobilinogen Urine 0.2 0.2, 1.0 E.U./dL    Nitrite Urine Negative Negative    Leukocyte Esterase Urine Negative Negative   UA Microscopic with Reflex to Culture   Result Value Ref Range    Bacteria Urine Moderate (A) None Seen /HPF    RBC Urine 10-25 (A) 0-2 /HPF /HPF    WBC Urine 5-10 (A) 0-5 /HPF /HPF    Squamous Epithelials Urine Few (A) None Seen /LPF    Mucus Urine Present (A) None Seen /LPF    Narrative    Urine Culture not indicated         Jose Luis Plata, MS3  [facility] Medical Student    I evaluated and treated this patient in conjunction with the medical student.  Signed Electronically by: Alberto Milton MD

## 2025-04-21 NOTE — PATIENT INSTRUCTIONS
Thanks for choosing the [facility] for your medical care today.  You were seen for loose oily stools.  I advise stool studies to further evaluate.  Please return them to the lab at your earliest convenience.  You may use imodium as needed.  Also push fluids.

## 2025-04-22 LAB

## 2025-04-22 PROCEDURE — 87507 IADNA-DNA/RNA PROBE TQ 12-25: CPT | Mod: GZ | Performed by: PREVENTIVE MEDICINE

## 2025-04-22 PROCEDURE — 87493 C DIFF AMPLIFIED PROBE: CPT | Mod: 59 | Performed by: PREVENTIVE MEDICINE

## 2025-06-12 ENCOUNTER — VIRTUAL VISIT (OUTPATIENT)
Dept: INTERNAL MEDICINE | Facility: CLINIC | Age: 70
End: 2025-06-12
Payer: COMMERCIAL

## 2025-06-12 DIAGNOSIS — K30 INDIGESTION: Primary | ICD-10-CM

## 2025-06-12 NOTE — PROGRESS NOTES
"  TELEPHONE VISIT                                                      ASSESSMENT/PLAN                                                      (K30) Indigestion  (primary encounter diagnosis)  Plan: patient encouraged to adhere to her simple and bland diet and advance slowly as tolerated; patient should monitor her caloric intake and make sure she is getting enough calories each day; recommend increasing Metamucil to 2 scoops daily for stool bulking purposes; patient will be scheduling her follow-up colonoscopy in the next several months; if symptoms worsen, change, or do not continue to improve, patient to contact MD.      Total time of call between patient and provider was 20 minutes. Provider location: office. Patient location: home.    Makayla Hawthorne MD   [facility]  [address] [place] [id number]  T: [phone], F: [phone]    SUBJECTIVE                                                      Melva Saleh is a very pleasant 70 year old female who requested a telephone visit to discuss digestion issues:    Telephone visit was conducted because video connection was lost and majority of the visit was completed via audio-only.    Patient had acute diverticulitis in March. Since then patient has been struggling with her diet.  She is able to tolerate bland and simple foods but has difficulty tolerating more complex foods/rich like foods baked in her with vegetable oil and red meats. She is having multiple small, soft, sometimes loose bowel movements each day. She is currently using Metamucil, 1 scoop, daily and consistently.    Overall, her indigestion issues are slowly improving over time, but she is concerned about the slow pace of recovery. She is a \"foodie\" and is eager to get back to more interesting foods. She has lost 20 pounds over the last several months due to her restricted diet and decreased caloric intake.    ---    (Note was completed, in part, with [employer] voice-recognition " software. Documentation reviewed, but some grammatical, spelling, and word errors may remain.)

## 2025-07-02 ENCOUNTER — TELEPHONE (OUTPATIENT)
Dept: INTERNAL MEDICINE | Facility: CLINIC | Age: 70
End: 2025-07-02
Payer: COMMERCIAL

## 2025-07-02 DIAGNOSIS — K30 INDIGESTION: Primary | ICD-10-CM

## 2025-07-02 NOTE — TELEPHONE ENCOUNTER
"Pt called the clinic stating that her digestive symptoms have not improved and she feels that possibly her diagnosis has not yet been correctly identified.     She is not gaining weight but is \"eating and eating so much food\".  She states \"my weight is still inching down\".   Last weight was 134.4 lbs this morning.   Stool has been \"fairly loose\". No blood in the stool.   No abdominal pain. No fevers.   Stools are big and bulky - has approx. 4 stools per day.     She has been using Metamucil and changing her diet as advised by PCP.   She has not yet scheduled a follow-up colonoscopy.     Routing to PCP to advise next steps.       Can we leave a detailed message on this number? YES  Phone number patient can be reached at: Home number on file [phone] (home)    Reyna Rivera RN  [facility] Triage    "

## 2025-07-03 ENCOUNTER — PATIENT OUTREACH (OUTPATIENT)
Dept: CARE COORDINATION | Facility: CLINIC | Age: 70
End: 2025-07-03

## 2025-07-03 ENCOUNTER — HOSPITAL ENCOUNTER (EMERGENCY)
Facility: CLINIC | Age: 70
Discharge: HOME | End: 2025-07-03
Attending: SOCIAL WORKER
Payer: COMMERCIAL

## 2025-07-03 VITALS
DIASTOLIC BLOOD PRESSURE: 90 MMHG | RESPIRATION RATE: 25 BRPM | WEIGHT: 133 LBS | OXYGEN SATURATION: 97 % | BODY MASS INDEX: 19.7 KG/M2 | SYSTOLIC BLOOD PRESSURE: 136 MMHG | HEIGHT: 69 IN | HEART RATE: 85 BPM | TEMPERATURE: 98.7 F

## 2025-07-03 DIAGNOSIS — R42 LIGHTHEADEDNESS: ICD-10-CM

## 2025-07-03 DIAGNOSIS — R63.4 WEIGHT LOSS: ICD-10-CM

## 2025-07-03 DIAGNOSIS — R19.5 LOOSE STOOLS: ICD-10-CM

## 2025-07-03 LAB
ALBUMIN SERPL BCG-MCNC: 4.6 G/DL (ref 3.5–5.2)
ALP SERPL-CCNC: 99 U/L (ref 40–150)
ALT SERPL W P-5'-P-CCNC: 29 U/L (ref 0–50)
ANION GAP SERPL CALCULATED.3IONS-SCNC: 12 MMOL/L (ref 7–15)
AST SERPL W P-5'-P-CCNC: 22 U/L (ref 0–45)
ATRIAL RATE - MUSE: 82 BPM
BASOPHILS # BLD AUTO: 0 10E3/UL (ref 0–0.2)
BASOPHILS NFR BLD AUTO: 0 %
BILIRUB DIRECT SERPL-MCNC: 0.13 MG/DL (ref 0–0.3)
BILIRUB SERPL-MCNC: 0.5 MG/DL
BUN SERPL-MCNC: 11.4 MG/DL (ref 8–23)
CALCIUM SERPL-MCNC: 9.6 MG/DL (ref 8.8–10.4)
CHLORIDE SERPL-SCNC: 100 MMOL/L (ref 98–107)
CREAT SERPL-MCNC: 0.54 MG/DL (ref 0.51–0.95)
DIASTOLIC BLOOD PRESSURE - MUSE: NORMAL MMHG
EGFRCR SERPLBLD CKD-EPI 2021: >90 ML/MIN/1.73M2
EOSINOPHIL # BLD AUTO: 0.1 10E3/UL (ref 0–0.7)
EOSINOPHIL NFR BLD AUTO: 1 %
ERYTHROCYTE [DISTWIDTH] IN BLOOD BY AUTOMATED COUNT: 13.9 % (ref 10–15)
GLUCOSE SERPL-MCNC: 134 MG/DL (ref 70–99)
HCO3 SERPL-SCNC: 24 MMOL/L (ref 22–29)
HCT VFR BLD AUTO: 43.1 % (ref 35–47)
HGB BLD-MCNC: 14.4 G/DL (ref 11.7–15.7)
IMM GRANULOCYTES # BLD: 0 10E3/UL
IMM GRANULOCYTES NFR BLD: 0 %
INTERPRETATION ECG - MUSE: NORMAL
LIPASE SERPL-CCNC: 5 U/L (ref 13–60)
LYMPHOCYTES # BLD AUTO: 1 10E3/UL (ref 0.8–5.3)
LYMPHOCYTES NFR BLD AUTO: 10 %
MCH RBC QN AUTO: 28.8 PG (ref 26.5–33)
MCHC RBC AUTO-ENTMCNC: 33.4 G/DL (ref 31.5–36.5)
MCV RBC AUTO: 86 FL (ref 78–100)
MONOCYTES # BLD AUTO: 0.5 10E3/UL (ref 0–1.3)
MONOCYTES NFR BLD AUTO: 5 %
NEUTROPHILS # BLD AUTO: 8.3 10E3/UL (ref 1.6–8.3)
NEUTROPHILS NFR BLD AUTO: 84 %
NRBC # BLD AUTO: 0 10E3/UL
NRBC BLD AUTO-RTO: 0 /100
P AXIS - MUSE: 39 DEGREES
PLATELET # BLD AUTO: 217 10E3/UL (ref 150–450)
POTASSIUM SERPL-SCNC: 3.7 MMOL/L (ref 3.4–5.3)
PR INTERVAL - MUSE: 160 MS
PROT SERPL-MCNC: 7.3 G/DL (ref 6.4–8.3)
QRS DURATION - MUSE: 94 MS
QT - MUSE: 370 MS
QTC - MUSE: 432 MS
R AXIS - MUSE: -51 DEGREES
RBC # BLD AUTO: 5 10E6/UL (ref 3.8–5.2)
SODIUM SERPL-SCNC: 136 MMOL/L (ref 135–145)
SYSTOLIC BLOOD PRESSURE - MUSE: NORMAL MMHG
T AXIS - MUSE: 53 DEGREES
TSH SERPL DL<=0.005 MIU/L-ACNC: 1.44 UIU/ML (ref 0.3–4.2)
VENTRICULAR RATE- MUSE: 82 BPM
WBC # BLD AUTO: 9.9 10E3/UL (ref 4–11)

## 2025-07-03 PROCEDURE — 258N000003 HC RX IP 258 OP 636: Performed by: SOCIAL WORKER

## 2025-07-03 PROCEDURE — 82248 BILIRUBIN DIRECT: CPT | Performed by: SOCIAL WORKER

## 2025-07-03 PROCEDURE — 84443 ASSAY THYROID STIM HORMONE: CPT | Performed by: SOCIAL WORKER

## 2025-07-03 PROCEDURE — 83690 ASSAY OF LIPASE: CPT | Performed by: SOCIAL WORKER

## 2025-07-03 PROCEDURE — 82247 BILIRUBIN TOTAL: CPT | Performed by: SOCIAL WORKER

## 2025-07-03 PROCEDURE — 85025 COMPLETE CBC W/AUTO DIFF WBC: CPT | Performed by: SOCIAL WORKER

## 2025-07-03 PROCEDURE — 93005 ELECTROCARDIOGRAM TRACING: CPT

## 2025-07-03 PROCEDURE — 80048 BASIC METABOLIC PNL TOTAL CA: CPT | Performed by: SOCIAL WORKER

## 2025-07-03 PROCEDURE — 96360 HYDRATION IV INFUSION INIT: CPT

## 2025-07-03 PROCEDURE — 36415 COLL VENOUS BLD VENIPUNCTURE: CPT | Performed by: SOCIAL WORKER

## 2025-07-03 PROCEDURE — 99284 EMERGENCY DEPT VISIT MOD MDM: CPT | Mod: 25

## 2025-07-03 RX ADMIN — SODIUM CHLORIDE 1000 ML: 0.9 INJECTION, SOLUTION INTRAVENOUS at 10:21

## 2025-07-03 ASSESSMENT — COLUMBIA-SUICIDE SEVERITY RATING SCALE - C-SSRS
6. HAVE YOU EVER DONE ANYTHING, STARTED TO DO ANYTHING, OR PREPARED TO DO ANYTHING TO END YOUR LIFE?: NO
2. HAVE YOU ACTUALLY HAD ANY THOUGHTS OF KILLING YOURSELF IN THE PAST MONTH?: NO
1. IN THE PAST MONTH, HAVE YOU WISHED YOU WERE DEAD OR WISHED YOU COULD GO TO SLEEP AND NOT WAKE UP?: NO

## 2025-07-03 ASSESSMENT — ACTIVITIES OF DAILY LIVING (ADL)
ADLS_ACUITY_SCORE: 45
ADLS_ACUITY_SCORE: 45
ADLS_ACUITY_SCORE: 41

## 2025-07-03 NOTE — ED TRIAGE NOTES
Lightheaded, nauseated with fast heart rate at times. Today pt feels worse, has had a recent diagnosis diverticulitis that has not been resolving. Pt states she has also lost weight rapidly since April. Denies fevers.      Triage Assessment (Adult)       Row Name 07/03/25 0936          Triage Assessment    Airway WDL WDL        Respiratory WDL    Respiratory WDL WDL        Skin Circulation/Temperature WDL    Skin Circulation/Temperature WDL WDL        Cardiac WDL    Cardiac WDL X  feels fast at times        Peripheral/Neurovascular WDL    Peripheral Neurovascular WDL WDL        Cognitive/Neuro/Behavioral WDL    Cognitive/Neuro/Behavioral WDL X  lightheaded

## 2025-07-03 NOTE — DISCHARGE INSTRUCTIONS
You were seen in the emergency department for continued loose stools, lightheadedness, weight loss.  Your exam and workup here was overall reassuring and we do not see signs of an acute emergency at this time.     I have placed an expedited GI referral, they should be calling you in the next few days to schedule appointment in the next 3 to 5 days.  If you do not hear from them, you can call them at the number that is listed.  You can also call the [facility] GI group to see if you can get in closely with them.  With the imaging that showed the possible chronic autoimmune pancreatitis and the lipase levels, this is something you should bring up with them.    Come back to the emergency department if you start to have vomiting cannot keep anything down, chest pain, shortness of breath, you have fainting, abdominal pain, or any other concerning symptoms.

## 2025-07-03 NOTE — ED PROVIDER NOTES
"  Emergency Department Note      History of Present Illness     Chief Complaint   Abdominal Pain, Dizziness, Fatigue, and Nausea      HPI   Melva Saleh is a 70 year old female with recent history of diverticulitis in 3/2025 who presents for evaluation of abdominal pain. Patient reports that since recovering from diverticulitis she has been been struggling with weight loss and loose stools. She states that she has lost 20 pounds in the last few months and recently all of her bowel movements have been loose and oily, though still soft and formed, and she has been having a significant amount of bowel movements daily.  She has had her stool tested for C. difficile recently with no significant findings. Her PCP has recommended she incorporate more fiber into her diet but this has not helped her so far. She states that recently she has been feeling worse with intermittent fast heart beat and this morning, woke up very hungry but then the oatmeal she ate did not sit well in her stomach. No new abdominal pain. She adds that she has been drinking fluids with her meals and typically follows this up with a Liquid IV but has not had the electrolyte powder recently. Patient denies experiencing any black or bloody stools, chest pain, or urinary concerns. No new heat intolerance or hair thinning.       Independent Historian   None    Review of External Notes   I reviewed the phone call from patient yesterday to internal medicine: \"Pt called the clinic stating that her digestive symptoms have not improved and she feels that possibly her diagnosis has not yet been correctly identified. She is not gaining weight but is \"eating and eating so much food\". She states \"my weight is still inching down\".  Last weight was 134.4 lbs this morning.  Stool has been \"fairly loose\". No blood in the stool. No abdominal pain. No fevers. Stools are big and bulky - has approx. 4 stools per day.  She has been using Metamucil and changing her diet as " "advised by PCP. She has not yet scheduled a follow-up colonoscopy. \"     I reviewed the virtual visit from 6/12/225: Patient had diverticulitis in March, has been struggling with her diet.  Multiple loose stools per day.  Has lost 20 pounds over the last several months due to her restricted diet and decreased intake.    Past Medical History     Medical History and Problem List   BALBIR  Chronic left shoulder pain   Diverticulitis     Medications   None on file     Surgical History   Tonsillectomy and adenoidectomy  Colonoscopy  Retinal reattachment    Physical Exam     Patient Vitals for the past 24 hrs:   BP Temp Temp src Pulse Resp SpO2 Height Weight   07/03/25 1145 (!) 136/90 -- -- 85 25 97 % -- --   07/03/25 1115 (!) 141/87 -- -- 78 10 97 % -- --   07/03/25 1100 (!) 144/82 -- -- 80 12 99 % -- --   07/03/25 1045 (!) 144/83 -- -- 77 11 98 % -- --   07/03/25 1030 131/86 -- -- 83 14 97 % -- --   07/03/25 1000 (!) 170/98 -- -- 98 22 98 % -- --   07/03/25 0959 -- -- -- 100 12 98 % -- --   07/03/25 0945 (!) 150/92 -- -- 91 -- -- -- --   07/03/25 0934 (!) 150/73 98.7  F (37.1  C) Temporal 100 20 98 % 1.753 m (5' 9\") 60.3 kg (133 lb)     Physical Exam  General: Overall stable and nontoxic appearing  HEENT: Conjunctivae clear, no scleral icterus, mucous membranes moist  Neuro: Alert, moving all extremities equally with intention  CV: Regular rate and rhythm, radial and DP pulses equal  Respiratory: No signs of respiratory distress, lungs clear to auscultation bilaterally   Abdomen: Soft, without rigidity or rebound throughout   No focal RLQ tenderness   No focal RUQ tenderness   No CVA tenderness bilaterally  MSK: No lower extremity swelling or tenderness       Diagnostics     Lab Results   Labs Ordered and Resulted from Time of ED Arrival to Time of ED Departure   BASIC METABOLIC PANEL - Abnormal       Result Value    Sodium 136      Potassium 3.7      Chloride 100      Carbon Dioxide (CO2) 24      Anion Gap 12      Urea " Nitrogen 11.4      Creatinine 0.54      GFR Estimate >90      Calcium 9.6      Glucose 134 (*)    LIPASE - Abnormal    Lipase 5 (*)    TSH WITH FREE T4 REFLEX - Normal    TSH 1.44     HEPATIC FUNCTION PANEL - Normal    Protein Total 7.3      Albumin 4.6      Bilirubin Total 0.5      Alkaline Phosphatase 99      AST 22      ALT 29      Bilirubin Direct 0.13     CBC WITH PLATELETS AND DIFFERENTIAL    WBC Count 9.9      RBC Count 5.00      Hemoglobin 14.4      Hematocrit 43.1      MCV 86      MCH 28.8      MCHC 33.4      RDW 13.9      Platelet Count 217      % Neutrophils 84      % Lymphocytes 10      % Monocytes 5      % Eosinophils 1      % Basophils 0      % Immature Granulocytes 0      NRBCs per 100 WBC 0      Absolute Neutrophils 8.3      Absolute Lymphocytes 1.0      Absolute Monocytes 0.5      Absolute Eosinophils 0.1      Absolute Basophils 0.0      Absolute Immature Granulocytes 0.0      Absolute NRBCs 0.0         Imaging   No orders to display       EKG   EKG 0952   Sinus rhythm with a left anterior fascicular block, no evidence of ischemia  Rate 82  QRS 94 QTc 432    Read by Marlene Fontana MD at 0954    Independent Interpretation   None    ED Course      Medications Administered   Medications   sodium chloride 0.9% BOLUS 1,000 mL (0 mLs Intravenous Stopped 7/3/25 1148)       Procedures   Procedures     Discussion of Management   None    ED Course   ED Course as of 07/03/25 2151   Thu Jul 03, 2025   0957 I obtained patient history and performed a physical exam.    1007 Patient had C. difficile tested on 4-, this was negative.  Also had enteric panel tested, negative as well   1133 I reassessed patient and prepared her for discharge.        Additional Documentation  None    Medical Decision Making / Diagnosis     CMS Diagnoses: None    MIPS   None               DAVID   Melva Saleh is a 70 year old female with a history of diverticulitis treated in March, anxiety, who presents to the emergency  department chief complaint of ongoing weight loss, loose stools for the past several months.  She has been seeing her primary care provider for this, recommended to increase her fiber intake.  I considered pancreatitis, thyrotoxicosis, C. Difficile.  It appears that she is actually already been tested for C. difficile as well as for enteric viruses and bacteria, and additionally she is not having more than 3 liquid stools per day.  Her stools are a mixture of formed and loose.  No report of dark tarry stools to suggest GI bleed.  Her lipase levels are not elevated, and in fact are low.  Thyroid levels are normal.  She does not have any focal abdominal tenderness for which emergent CT imaging is warranted at this time.  No transaminitis alk phos elevation, electrolytes are all within normal limits.  No fever, leukocytosis to suggest ongoing infection.  No urinary symptoms at all that would make me think urinary tract infection.  Her EKG shows sinus rhythm.  No evidence of A-fib or other tachydysrhythmias. No signs of ischemia and her symptoms do not suggest ACS without any chest pain, shortness of breath and with the duration of her symptoms. She felt improved after fluids in the ED. I discussed her prior CT scan which showed possible signs of chronic pancreatitis, possibly autoimmune, for which she should have GI follow up. No severe pain, PO intolerance that would warrant admission at this time.  I have given an expedited GI follow-up and also given the information for other GI groups that she can get into see them.  She will follow-up with her primary care provider and strict return precautions were discussed, she verbalizes understanding, is comfortable with plan for discharge at this time.      Disposition   The patient was discharged.     Diagnosis     ICD-10-CM    1. Weight loss  R63.4 Adult GI  Referral - Consult Only      2. Loose stools  R19.5 Adult GI  Referral - Consult Only      3.  Lightheadedness  R42            Discharge Medications   Discharge Medication List as of 7/3/2025 11:49 AM            Scribe Disclosure:  I, Mariajose Lewis, am serving as a scribe at 9:46 AM on 7/3/2025 to document services personally performed by Marlene Fontana MD based on my observations and the provider's statements to me.        Marlene Fontana MD  07/03/25 8928

## 2025-07-09 ENCOUNTER — PATIENT OUTREACH (OUTPATIENT)
Dept: CARE COORDINATION | Facility: CLINIC | Age: 70
End: 2025-07-09
Payer: COMMERCIAL

## 2025-07-25 ASSESSMENT — ANXIETY QUESTIONNAIRES
4. TROUBLE RELAXING: NEARLY EVERY DAY
6. BECOMING EASILY ANNOYED OR IRRITABLE: NOT AT ALL
GAD7 TOTAL SCORE: 15
3. WORRYING TOO MUCH ABOUT DIFFERENT THINGS: NEARLY EVERY DAY
1. FEELING NERVOUS, ANXIOUS, OR ON EDGE: NEARLY EVERY DAY
2. NOT BEING ABLE TO STOP OR CONTROL WORRYING: NEARLY EVERY DAY
7. FEELING AFRAID AS IF SOMETHING AWFUL MIGHT HAPPEN: MORE THAN HALF THE DAYS
GAD7 TOTAL SCORE: 15
5. BEING SO RESTLESS THAT IT IS HARD TO SIT STILL: SEVERAL DAYS
8. IF YOU CHECKED OFF ANY PROBLEMS, HOW DIFFICULT HAVE THESE MADE IT FOR YOU TO DO YOUR WORK, TAKE CARE OF THINGS AT HOME, OR GET ALONG WITH OTHER PEOPLE?: VERY DIFFICULT
7. FEELING AFRAID AS IF SOMETHING AWFUL MIGHT HAPPEN: MORE THAN HALF THE DAYS
GAD7 TOTAL SCORE: 15
IF YOU CHECKED OFF ANY PROBLEMS ON THIS QUESTIONNAIRE, HOW DIFFICULT HAVE THESE PROBLEMS MADE IT FOR YOU TO DO YOUR WORK, TAKE CARE OF THINGS AT HOME, OR GET ALONG WITH OTHER PEOPLE: VERY DIFFICULT

## 2025-07-31 ENCOUNTER — VIRTUAL VISIT (OUTPATIENT)
Dept: INTERNAL MEDICINE | Facility: CLINIC | Age: 70
End: 2025-07-31
Payer: COMMERCIAL

## 2025-07-31 DIAGNOSIS — F41.0 PANIC ATTACKS: ICD-10-CM

## 2025-07-31 DIAGNOSIS — F41.1 GAD (GENERALIZED ANXIETY DISORDER): Primary | ICD-10-CM

## 2025-07-31 RX ORDER — PANTOPRAZOLE SODIUM 40 MG/1
1 TABLET, DELAYED RELEASE ORAL DAILY
COMMUNITY
Start: 2025-07-21

## 2025-07-31 NOTE — PROGRESS NOTES
VIDEO VISIT                                                       ASSESSMENT/PLAN                                                      (F41.1) BALBIR (generalized anxiety disorder)  (primary encounter diagnosis)  (F41.0) Panic attacks  Plan: Zoloft prescribed; referred for counseling - patient will be contacted to schedule.      Total time of video call between patient and provider was 7 minutes (12:56-1:03pm). Provider location: office. Patient location: home. Platform: [place] [employer].     The longitudinal plan of care for the diagnosis(es)/condition(s) as documented were addressed during this visit. Due to the added complexity in care, I will continue to support Melva in the subsequent management and with ongoing continuity of care.     Makayla Hawthorne MD   [address] [address]  T: [phone], F: [phone]    SUBJECTIVE                                                      Melva Saleh is a very pleasant 70 year old female who requested a video visit to discuss anxiety and panic attacks:    Patient was made aware that this visit will be billed the same as an in-person visit and has given verbal consent to proceed with this video visit.     Patient has been having more anxiety and panic attacks as of late. They have been exacerbated by her GI/diverticulitis struggles as well as personal, non-medical stressors.     PMH significant for history of BALBIR that responded well to both counseling and Zoloft. She is interested in trying these both again.    OBJECTIVE                                                       Vitals: No vitals were obtained today due to virtual visit.    General: appears healthy, alert and no distress  Psychiatric: mentation normal, affect normal/bright, judgement and insight intact, normal speech and appearance well-groomed    ---    (Note was completed, in part, with [employer] voice-recognition software. Documentation reviewed, but some grammatical,  spelling, and word errors may remain.)

## 2025-08-19 ENCOUNTER — TRANSFERRED RECORDS (OUTPATIENT)
Dept: GASTROENTEROLOGY | Facility: CLINIC | Age: 70
End: 2025-08-19
Payer: COMMERCIAL

## 2025-08-28 ENCOUNTER — TRANSFERRED RECORDS (OUTPATIENT)
Dept: ADMINISTRATIVE | Facility: CLINIC | Age: 70
End: 2025-08-28
Payer: COMMERCIAL

## 2025-08-28 PROBLEM — M25.512 CHRONIC LEFT SHOULDER PAIN: Status: RESOLVED | Noted: 2024-08-12 | Resolved: 2025-08-28

## 2025-08-28 PROBLEM — G89.29 CHRONIC LEFT SHOULDER PAIN: Status: RESOLVED | Noted: 2024-08-12 | Resolved: 2025-08-28

## 2025-09-03 ENCOUNTER — TRANSFERRED RECORDS (OUTPATIENT)
Dept: ADMINISTRATIVE | Facility: CLINIC | Age: 70
End: 2025-09-03
Payer: COMMERCIAL

## (undated) RX ORDER — FENTANYL CITRATE 50 UG/ML
INJECTION, SOLUTION INTRAMUSCULAR; INTRAVENOUS
Status: DISPENSED
Start: 2018-10-01